# Patient Record
Sex: MALE | Race: WHITE | NOT HISPANIC OR LATINO | URBAN - METROPOLITAN AREA
[De-identification: names, ages, dates, MRNs, and addresses within clinical notes are randomized per-mention and may not be internally consistent; named-entity substitution may affect disease eponyms.]

---

## 2017-08-23 PROBLEM — C44.42 SQUAMOUS CELL CARCINOMA OF SKIN OF SCALP AND NECK: Status: ACTIVE | Noted: 2017-08-23

## 2017-08-23 PROBLEM — L91.8 OTHER HYPERTROPHIC DISORDERS OF THE SKIN: Status: ACTIVE | Noted: 2017-08-23

## 2017-09-28 ENCOUNTER — APPOINTMENT (RX ONLY)
Dept: URBAN - METROPOLITAN AREA CLINIC 36 | Facility: CLINIC | Age: 72
Setting detail: DERMATOLOGY
End: 2017-09-28

## 2017-09-28 DIAGNOSIS — Z48.02 ENCOUNTER FOR REMOVAL OF SUTURES: ICD-10-CM

## 2017-09-28 PROCEDURE — ? SUTURE REMOVAL (GLOBAL PERIOD)

## 2017-09-28 ASSESSMENT — LOCATION DETAILED DESCRIPTION DERM: LOCATION DETAILED: LEFT MEDIAL FRONTAL SCALP

## 2017-09-28 ASSESSMENT — LOCATION ZONE DERM: LOCATION ZONE: SCALP

## 2017-09-28 ASSESSMENT — LOCATION SIMPLE DESCRIPTION DERM: LOCATION SIMPLE: LEFT SCALP

## 2017-09-28 NOTE — PROCEDURE: SUTURE REMOVAL (GLOBAL PERIOD)
Detail Level: Detailed
Add 20165 Cpt? (Important Note: In 2017 The Use Of 91705 Is Being Tracked By Cms To Determine Future Global Period Reimbursement For Global Periods): no

## 2017-10-09 ENCOUNTER — RX ONLY (OUTPATIENT)
Age: 72
Setting detail: RX ONLY
End: 2017-10-09

## 2017-10-09 RX ORDER — TRIAMCINOLONE ACETONIDE 1 MG/G
TWICE CREAM TOPICAL DAILY
Qty: 1 | Refills: 0 | Status: ERX | COMMUNITY
Start: 2017-10-09

## 2017-10-18 ENCOUNTER — APPOINTMENT (RX ONLY)
Dept: URBAN - METROPOLITAN AREA CLINIC 20 | Facility: CLINIC | Age: 72
Setting detail: DERMATOLOGY
End: 2017-10-18

## 2017-10-18 DIAGNOSIS — R21 RASH AND OTHER NONSPECIFIC SKIN ERUPTION: ICD-10-CM

## 2017-10-18 PROCEDURE — ? COUNSELING

## 2017-10-18 PROCEDURE — 99213 OFFICE O/P EST LOW 20 MIN: CPT | Mod: 25

## 2017-10-18 PROCEDURE — ? ADDITIONAL NOTES

## 2017-10-18 PROCEDURE — 11100: CPT

## 2017-10-18 PROCEDURE — ? BIOPSY BY SHAVE METHOD

## 2017-10-18 PROCEDURE — ? SEPARATE AND IDENTIFIABLE DOCUMENTATION

## 2017-10-18 ASSESSMENT — LOCATION DETAILED DESCRIPTION DERM
LOCATION DETAILED: RIGHT PROXIMAL DORSAL FOREARM
LOCATION DETAILED: LEFT PROXIMAL DORSAL FOREARM
LOCATION DETAILED: LEFT PROXIMAL PRETIBIAL REGION
LOCATION DETAILED: RIGHT PROXIMAL PRETIBIAL REGION

## 2017-10-18 ASSESSMENT — LOCATION SIMPLE DESCRIPTION DERM
LOCATION SIMPLE: LEFT PRETIBIAL REGION
LOCATION SIMPLE: LEFT FOREARM
LOCATION SIMPLE: RIGHT PRETIBIAL REGION
LOCATION SIMPLE: RIGHT FOREARM

## 2017-10-18 ASSESSMENT — LOCATION ZONE DERM
LOCATION ZONE: LEG
LOCATION ZONE: ARM

## 2017-10-18 NOTE — HPI: RASH
How Severe Is Your Rash?: mild
Is This A New Presentation, Or A Follow-Up?: Follow Up Rash
Additional History: Pt is using Amlactin BID

## 2017-10-18 NOTE — PROCEDURE: COUNSELING
Patient Specific Counseling (Will Not Stick From Patient To Patient): D/c Amlactin and TAC.  Recommended OTC sensitive skin moisturizers to soothe the skin for now.
Detail Level: Zone

## 2017-10-18 NOTE — PROCEDURE: BIOPSY BY SHAVE METHOD
Anesthesia Type: 0.5% lidocaine with 1:400,000 epinephrine
Electrodesiccation And Curettage Text: The wound bed was treated with electrodesiccation and curettage after the biopsy was performed.
Biopsy Type: H and E
Billing Type: Third-Party Bill
Anesthesia Volume In Cc: 0.5
Wound Care: Aquaphor
Curettage Text: The wound bed was treated with curettage after the biopsy was performed.
Bill [id number] For Specimen Handling/Conveyance To Laboratory?: no
Electrodesiccation Text: The wound bed was treated with electrodesiccation after the biopsy was performed.
Biopsy Method: Personna blade
Silver Nitrate Text: The wound bed was treated with silver nitrate after the biopsy was performed.
Lab Facility: 
Render Post-Care Instructions In Note?: yes
X Size Of Lesion In Cm: 0
Consent: Written and verbal consent were obtained and risks were reviewed including but not limited to scarring, infection, bleeding, scabbing, incomplete removal, nerve damage and allergy to anesthesia.
Dressing: Band-Aid
Type Of Destruction Used: Curettage
Post-Care Instructions: Keep the biopsy site dry overnight, then keep the site clean by washing with soap and water twice daily then covering with Vaseline/Aquaphor and a Band-Aid until healed.
Cryotherapy Text: The wound bed was treated with cryotherapy after the biopsy was performed.
Lab: [id number]
Detail Level: Detailed
Hemostasis: Drysol and Electrocautery
Notification Instructions: Patient will be notified of biopsy results; however, patient is instructed to call the office if not contacted within 2 weeks.

## 2017-12-01 ENCOUNTER — APPOINTMENT (RX ONLY)
Dept: URBAN - METROPOLITAN AREA CLINIC 5 | Facility: CLINIC | Age: 72
Setting detail: DERMATOLOGY
End: 2017-12-01

## 2017-12-01 DIAGNOSIS — D22 MELANOCYTIC NEVI: ICD-10-CM

## 2017-12-01 DIAGNOSIS — L82.1 OTHER SEBORRHEIC KERATOSIS: ICD-10-CM

## 2017-12-01 DIAGNOSIS — L56.5 DISSEMINATED SUPERFICIAL ACTINIC POROKERATOSIS (DSAP): ICD-10-CM

## 2017-12-01 DIAGNOSIS — L57.0 ACTINIC KERATOSIS: ICD-10-CM

## 2017-12-01 DIAGNOSIS — L20.89 OTHER ATOPIC DERMATITIS: ICD-10-CM

## 2017-12-01 DIAGNOSIS — L82.0 INFLAMED SEBORRHEIC KERATOSIS: ICD-10-CM

## 2017-12-01 DIAGNOSIS — Z85.828 PERSONAL HISTORY OF OTHER MALIGNANT NEOPLASM OF SKIN: ICD-10-CM

## 2017-12-01 DIAGNOSIS — L81.4 OTHER MELANIN HYPERPIGMENTATION: ICD-10-CM

## 2017-12-01 DIAGNOSIS — L72.0 EPIDERMAL CYST: ICD-10-CM

## 2017-12-01 DIAGNOSIS — Z87.891 PERSONAL HISTORY OF NICOTINE DEPENDENCE: ICD-10-CM

## 2017-12-01 DIAGNOSIS — Z71.89 OTHER SPECIFIED COUNSELING: ICD-10-CM

## 2017-12-01 PROBLEM — L85.3 XEROSIS CUTIS: Status: ACTIVE | Noted: 2017-12-01

## 2017-12-01 PROBLEM — L20.84 INTRINSIC (ALLERGIC) ECZEMA: Status: ACTIVE | Noted: 2017-12-01

## 2017-12-01 PROBLEM — I10 ESSENTIAL (PRIMARY) HYPERTENSION: Status: ACTIVE | Noted: 2017-12-01

## 2017-12-01 PROBLEM — D22.5 MELANOCYTIC NEVI OF TRUNK: Status: ACTIVE | Noted: 2017-12-01

## 2017-12-01 PROBLEM — J45.909 UNSPECIFIED ASTHMA, UNCOMPLICATED: Status: ACTIVE | Noted: 2017-12-01

## 2017-12-01 PROBLEM — L70.0 ACNE VULGARIS: Status: ACTIVE | Noted: 2017-12-01

## 2017-12-01 PROBLEM — D48.5 NEOPLASM OF UNCERTAIN BEHAVIOR OF SKIN: Status: ACTIVE | Noted: 2017-12-01

## 2017-12-01 PROCEDURE — ? OBSERVATION

## 2017-12-01 PROCEDURE — 17111 DESTRUCTION B9 LESIONS 15/>: CPT | Mod: 59

## 2017-12-01 PROCEDURE — ? BIOPSY BY SHAVE METHOD

## 2017-12-01 PROCEDURE — 11100: CPT | Mod: 59

## 2017-12-01 PROCEDURE — ? COUNSELING

## 2017-12-01 PROCEDURE — 99203 OFFICE O/P NEW LOW 30 MIN: CPT | Mod: 25

## 2017-12-01 PROCEDURE — 17004 DESTROY PREMAL LESIONS 15/>: CPT

## 2017-12-01 PROCEDURE — ? OTHER

## 2017-12-01 PROCEDURE — ? LIQUID NITROGEN

## 2017-12-01 PROCEDURE — ? PRESCRIPTION

## 2017-12-01 RX ORDER — FLUOROURACIL 2 G/40G
CREAM TOPICAL BID
Qty: 2 | Refills: 6 | Status: ERX | COMMUNITY
Start: 2017-12-01

## 2017-12-01 RX ADMIN — FLUOROURACIL 1: 2 CREAM TOPICAL at 00:00

## 2017-12-01 ASSESSMENT — LOCATION DETAILED DESCRIPTION DERM
LOCATION DETAILED: LEFT POSTERIOR SHOULDER
LOCATION DETAILED: LEFT CLAVICULAR NECK
LOCATION DETAILED: EPIGASTRIC SKIN
LOCATION DETAILED: INFERIOR THORACIC SPINE
LOCATION DETAILED: RIGHT SUPERIOR UPPER BACK
LOCATION DETAILED: RIGHT CENTRAL FRONTAL SCALP
LOCATION DETAILED: LEFT LATERAL SUPERIOR CHEST
LOCATION DETAILED: LEFT ANTERIOR DISTAL THIGH
LOCATION DETAILED: RIGHT CLAVICULAR NECK
LOCATION DETAILED: RIGHT MEDIAL SUPERIOR CHEST
LOCATION DETAILED: LEFT ANTERIOR PROXIMAL THIGH
LOCATION DETAILED: RIGHT PROXIMAL DORSAL FOREARM
LOCATION DETAILED: RIGHT ULNAR DORSAL HAND
LOCATION DETAILED: RIGHT SUPERIOR FOREHEAD
LOCATION DETAILED: RIGHT VENTRAL PROXIMAL FOREARM
LOCATION DETAILED: LEFT SUPERIOR PARIETAL SCALP
LOCATION DETAILED: LEFT CLAVICULAR SKIN
LOCATION DETAILED: RIGHT INFERIOR POSTERIOR NECK
LOCATION DETAILED: LEFT INFERIOR POSTAURICULAR SKIN
LOCATION DETAILED: RIGHT MEDIAL DORSAL WRIST
LOCATION DETAILED: RIGHT PROXIMAL PRETIBIAL REGION
LOCATION DETAILED: LEFT MEDIAL SUPERIOR CHEST
LOCATION DETAILED: RIGHT DISTAL POSTERIOR UPPER ARM
LOCATION DETAILED: RIGHT ANTERIOR DISTAL THIGH
LOCATION DETAILED: RIGHT DORSAL WRIST
LOCATION DETAILED: LEFT ANTERIOR SHOULDER
LOCATION DETAILED: LEFT DISTAL DORSAL FOREARM
LOCATION DETAILED: RIGHT DISTAL DORSAL FOREARM
LOCATION DETAILED: RIGHT DISTAL ULNAR DORSAL FOREARM
LOCATION DETAILED: LEFT PROXIMAL DORSAL FOREARM
LOCATION DETAILED: LEFT PROXIMAL RADIAL DORSAL FOREARM
LOCATION DETAILED: LEFT SUPERIOR MEDIAL UPPER BACK
LOCATION DETAILED: RIGHT LATERAL SUPERIOR CHEST
LOCATION DETAILED: LEFT PROXIMAL PRETIBIAL REGION
LOCATION DETAILED: RIGHT SUPERIOR MEDIAL UPPER BACK
LOCATION DETAILED: PERIUMBILICAL SKIN
LOCATION DETAILED: LEFT INFERIOR LATERAL NECK
LOCATION DETAILED: LEFT DISTAL RADIAL DORSAL FOREARM
LOCATION DETAILED: RIGHT POSTERIOR SHOULDER
LOCATION DETAILED: RIGHT MEDIAL INFERIOR CHEST
LOCATION DETAILED: RIGHT PROXIMAL ULNAR DORSAL FOREARM

## 2017-12-01 ASSESSMENT — LOCATION SIMPLE DESCRIPTION DERM
LOCATION SIMPLE: RIGHT THIGH
LOCATION SIMPLE: POSTERIOR NECK
LOCATION SIMPLE: RIGHT WRIST
LOCATION SIMPLE: RIGHT HAND
LOCATION SIMPLE: RIGHT PRETIBIAL REGION
LOCATION SIMPLE: SCALP
LOCATION SIMPLE: RIGHT FOREARM
LOCATION SIMPLE: UPPER BACK
LOCATION SIMPLE: RIGHT ANTERIOR NECK
LOCATION SIMPLE: LEFT CLAVICULAR SKIN
LOCATION SIMPLE: RIGHT FOREHEAD
LOCATION SIMPLE: RIGHT SCALP
LOCATION SIMPLE: LEFT THIGH
LOCATION SIMPLE: LEFT ANTERIOR NECK
LOCATION SIMPLE: ABDOMEN
LOCATION SIMPLE: RIGHT UPPER ARM
LOCATION SIMPLE: LEFT FOREARM
LOCATION SIMPLE: LEFT UPPER BACK
LOCATION SIMPLE: CHEST
LOCATION SIMPLE: RIGHT SHOULDER
LOCATION SIMPLE: LEFT SHOULDER
LOCATION SIMPLE: LEFT PRETIBIAL REGION
LOCATION SIMPLE: RIGHT UPPER BACK

## 2017-12-01 ASSESSMENT — LOCATION ZONE DERM
LOCATION ZONE: TRUNK
LOCATION ZONE: NECK
LOCATION ZONE: ARM
LOCATION ZONE: FACE
LOCATION ZONE: LEG
LOCATION ZONE: HAND
LOCATION ZONE: SCALP

## 2017-12-01 NOTE — PROCEDURE: BIOPSY BY SHAVE METHOD
Detail Level: Detailed
Consent: Written consent was obtained and risks were reviewed including but not limited to scarring, infection, bleeding, scabbing, incomplete removal, nerve damage and allergy to anesthesia.
Curettage Text: The wound bed was treated with curettage after the biopsy was performed.
Bill For Surgical Tray: no
X Size Of Lesion In Cm: 0
Biopsy Type: H and E
Anesthesia Type: 1% lidocaine with epinephrine
Notification Instructions: Patient will be notified of biopsy results. However, patient instructed to call the office if not contacted within 2 weeks. Results will be faxed to PCP once they are available.
Lab: [id number]
Silver Nitrate Text: The wound bed was treated with silver nitrate after the biopsy was performed.
Hemostasis: Drysol
Body Location Override (Optional - Billing Will Still Be Based On Selected Body Map Location If Applicable): right upper back
Electrodesiccation Text: The wound bed was treated with electrodesiccation after the biopsy was performed.
Lab Facility: [id number]
Billing Type: Third-Party Bill
Post-Care Instructions: I reviewed with the patient in detail post-care instructions. Patient is to keep the biopsy site dry overnight, and then apply bacitracin twice daily until healed. Patient may apply hydrogen peroxide soaks to remove any crusting.
Biopsy Method: Dermablade
Cryotherapy Text: The wound bed was treated with cryotherapy after the biopsy was performed.
Dressing: bandage
Render Post-Care Instructions In Note?: yes
Wound Care: Petrolatum
Electrodesiccation And Curettage Text: The wound bed was treated with electrodesiccation and curettage after the biopsy was performed.
Type Of Destruction Used: Curettage
Anesthesia Volume In Cc: 0.5

## 2017-12-01 NOTE — PROCEDURE: LIQUID NITROGEN
Render Post-Care Instructions In Note?: yes
Consent: The patient's consent was obtained including but not limited to risks of crusting, scabbing, blistering, scarring, darker or lighter pigmentary change, recurrence, incomplete removal and infection.
Post-Care Instructions: I reviewed with the patient in detail post-care instructions. Patient is to wear sunprotection, and avoid picking at any of the treated lesions. Pt may apply Vaseline to crusted or scabbing areas.
Detail Level: Detailed
Duration Of Freeze Thaw-Cycle (Seconds): 5
Medical Necessity Clause: This procedure was medically necessary because the lesions that were treated were:
Include Z78.9 (Other Specified Conditions Influencing Health Status) As An Associated Diagnosis?: No
Medical Necessity Information: It is in your best interest to select a reason for this procedure from the list below. All of these items fulfill various CMS LCD requirements except the new and changing color options.
Number Of Freeze-Thaw Cycles: 1 freeze-thaw cycle

## 2017-12-01 NOTE — PROCEDURE: OTHER
Other (Free Text): Discussed and reviewed wound care.
Note Text (......Xxx Chief Complaint.): This diagnosis correlates with the
Detail Level: Zone

## 2018-04-05 ENCOUNTER — APPOINTMENT (RX ONLY)
Dept: URBAN - METROPOLITAN AREA CLINIC 5 | Facility: CLINIC | Age: 73
Setting detail: DERMATOLOGY
End: 2018-04-05

## 2018-04-05 DIAGNOSIS — L57.0 ACTINIC KERATOSIS: ICD-10-CM

## 2018-04-05 DIAGNOSIS — D22 MELANOCYTIC NEVI: ICD-10-CM

## 2018-04-05 DIAGNOSIS — L82.0 INFLAMED SEBORRHEIC KERATOSIS: ICD-10-CM

## 2018-04-05 DIAGNOSIS — L85.3 XEROSIS CUTIS: ICD-10-CM

## 2018-04-05 DIAGNOSIS — L85.8 OTHER SPECIFIED EPIDERMAL THICKENING: ICD-10-CM

## 2018-04-05 PROBLEM — D48.5 NEOPLASM OF UNCERTAIN BEHAVIOR OF SKIN: Status: ACTIVE | Noted: 2018-04-05

## 2018-04-05 PROCEDURE — ? COUNSELING

## 2018-04-05 PROCEDURE — 17000 DESTRUCT PREMALG LESION: CPT | Mod: 59

## 2018-04-05 PROCEDURE — ? BIOPSY BY SHAVE METHOD

## 2018-04-05 PROCEDURE — 17003 DESTRUCT PREMALG LES 2-14: CPT

## 2018-04-05 PROCEDURE — 11100: CPT | Mod: 59

## 2018-04-05 PROCEDURE — ? LIQUID NITROGEN

## 2018-04-05 PROCEDURE — ? PRESCRIPTION

## 2018-04-05 PROCEDURE — 99213 OFFICE O/P EST LOW 20 MIN: CPT | Mod: 25

## 2018-04-05 PROCEDURE — 17110 DESTRUCTION B9 LES UP TO 14: CPT

## 2018-04-05 RX ORDER — TRIAMCINOLONE ACETONIDE 1 MG/G
1 CREAM TOPICAL TWICE DAILY
Qty: 1 | Refills: 6 | Status: ERX | COMMUNITY
Start: 2018-04-05

## 2018-04-05 RX ORDER — TRIAMCINOLONE ACETONIDE 1 MG/G
OINTMENT TOPICAL BID
Qty: 1 | Refills: 4 | Status: ERX | COMMUNITY
Start: 2018-04-05

## 2018-04-05 RX ADMIN — TRIAMCINOLONE ACETONIDE 1: 1 CREAM TOPICAL at 00:00

## 2018-04-05 RX ADMIN — TRIAMCINOLONE ACETONIDE 1: 1 OINTMENT TOPICAL at 00:00

## 2018-04-05 ASSESSMENT — LOCATION SIMPLE DESCRIPTION DERM
LOCATION SIMPLE: CHEST
LOCATION SIMPLE: RIGHT FOREARM
LOCATION SIMPLE: LEFT FOREHEAD
LOCATION SIMPLE: POSTERIOR NECK
LOCATION SIMPLE: RIGHT ANTERIOR NECK
LOCATION SIMPLE: LEFT FOREARM
LOCATION SIMPLE: RIGHT UPPER BACK
LOCATION SIMPLE: LEFT ACHILLES SKIN

## 2018-04-05 ASSESSMENT — LOCATION DETAILED DESCRIPTION DERM
LOCATION DETAILED: LEFT MEDIAL INFERIOR CHEST
LOCATION DETAILED: RIGHT INFERIOR LATERAL NECK
LOCATION DETAILED: RIGHT INFERIOR ANTERIOR NECK
LOCATION DETAILED: RIGHT CLAVICULAR NECK
LOCATION DETAILED: LEFT SUPERIOR FOREHEAD
LOCATION DETAILED: RIGHT INFERIOR POSTERIOR NECK
LOCATION DETAILED: LEFT DISTAL DORSAL FOREARM
LOCATION DETAILED: RIGHT POSTERIOR NECK
LOCATION DETAILED: LEFT DISTAL ULNAR DORSAL FOREARM
LOCATION DETAILED: RIGHT MEDIAL UPPER BACK
LOCATION DETAILED: MID POSTERIOR NECK
LOCATION DETAILED: RIGHT DISTAL DORSAL FOREARM
LOCATION DETAILED: LEFT PROXIMAL DORSAL FOREARM
LOCATION DETAILED: LEFT ACHILLES SKIN

## 2018-04-05 ASSESSMENT — LOCATION ZONE DERM
LOCATION ZONE: NECK
LOCATION ZONE: FACE
LOCATION ZONE: ARM
LOCATION ZONE: TRUNK
LOCATION ZONE: LEG

## 2018-04-05 NOTE — PROCEDURE: LIQUID NITROGEN
Consent: The patient's consent was obtained including but not limited to risks of crusting, scabbing, blistering, scarring, darker or lighter pigmentary change, recurrence, incomplete removal and infection.
Post-Care Instructions: I reviewed with the patient in detail post-care instructions. Patient is to wear sunprotection, and avoid picking at any of the treated lesions. Pt may apply Vaseline to crusted or scabbing areas.
Detail Level: Detailed
Render Post-Care Instructions In Note?: yes
Duration Of Freeze Thaw-Cycle (Seconds): 5
Add 52 Modifier (Optional): no
Number Of Freeze-Thaw Cycles: 1 freeze-thaw cycle
Medical Necessity Clause: This procedure was medically necessary because the lesions that were treated were:
Medical Necessity Information: It is in your best interest to select a reason for this procedure from the list below. All of these items fulfill various CMS LCD requirements except the new and changing color options.

## 2018-04-05 NOTE — PROCEDURE: BIOPSY BY SHAVE METHOD
Silver Nitrate Text: The wound bed was treated with silver nitrate after the biopsy was performed.
Lab Facility: [id number]
Bill [id number] For Specimen Handling/Conveyance To Laboratory?: no
Anesthesia Volume In Cc: 0.5
Type Of Destruction Used: Curettage
Consent: Written consent was obtained and risks were reviewed including but not limited to scarring, infection, bleeding, scabbing, incomplete removal, nerve damage and allergy to anesthesia.
Size Of Lesion In Cm: 0
Render Post-Care Instructions In Note?: yes
Anesthesia Type: 1% lidocaine with epinephrine
Electrodesiccation Text: The wound bed was treated with electrodesiccation after the biopsy was performed.
Post-Care Instructions: I reviewed with the patient in detail post-care instructions. Patient is to keep the biopsy site dry overnight, and then apply bacitracin twice daily until healed. Patient may apply hydrogen peroxide soaks to remove any crusting.
Notification Instructions: Patient will be notified of biopsy results. However, patient instructed to call the office if not contacted within 2 weeks. Results will be faxed to PCP once they are available.
Biopsy Type: H and E
Dressing: bandage
Curettage Text: The wound bed was treated with curettage after the biopsy was performed.
Lab: [id number]
Biopsy Method: Dermablade
Hemostasis: Aluminum Chloride
Cryotherapy Text: The wound bed was treated with cryotherapy after the biopsy was performed.
Body Location Override (Optional - Billing Will Still Be Based On Selected Body Map Location If Applicable): left superior central forehead
Billing Type: Third-Party Bill
Detail Level: Detailed
Wound Care: Vaseline
Electrodesiccation And Curettage Text: The wound bed was treated with electrodesiccation and curettage after the biopsy was performed.

## 2018-11-16 ENCOUNTER — APPOINTMENT (RX ONLY)
Dept: URBAN - METROPOLITAN AREA CLINIC 5 | Facility: CLINIC | Age: 73
Setting detail: DERMATOLOGY
End: 2018-11-16

## 2018-11-16 DIAGNOSIS — L738 OTHER SPECIFIED DISEASES OF HAIR AND HAIR FOLLICLES: ICD-10-CM

## 2018-11-16 DIAGNOSIS — Z71.89 OTHER SPECIFIED COUNSELING: ICD-10-CM

## 2018-11-16 DIAGNOSIS — L85.3 XEROSIS CUTIS: ICD-10-CM

## 2018-11-16 DIAGNOSIS — L57.0 ACTINIC KERATOSIS: ICD-10-CM

## 2018-11-16 DIAGNOSIS — D18.0 HEMANGIOMA: ICD-10-CM

## 2018-11-16 DIAGNOSIS — D22 MELANOCYTIC NEVI: ICD-10-CM

## 2018-11-16 DIAGNOSIS — L81.4 OTHER MELANIN HYPERPIGMENTATION: ICD-10-CM

## 2018-11-16 DIAGNOSIS — L663 OTHER SPECIFIED DISEASES OF HAIR AND HAIR FOLLICLES: ICD-10-CM

## 2018-11-16 DIAGNOSIS — Z87.891 PERSONAL HISTORY OF NICOTINE DEPENDENCE: ICD-10-CM

## 2018-11-16 DIAGNOSIS — L73.9 FOLLICULAR DISORDER, UNSPECIFIED: ICD-10-CM

## 2018-11-16 DIAGNOSIS — L82.1 OTHER SEBORRHEIC KERATOSIS: ICD-10-CM

## 2018-11-16 DIAGNOSIS — Z85.828 PERSONAL HISTORY OF OTHER MALIGNANT NEOPLASM OF SKIN: ICD-10-CM

## 2018-11-16 PROBLEM — L02.821 FURUNCLE OF HEAD [ANY PART, EXCEPT FACE]: Status: ACTIVE | Noted: 2018-11-16

## 2018-11-16 PROBLEM — D48.5 NEOPLASM OF UNCERTAIN BEHAVIOR OF SKIN: Status: ACTIVE | Noted: 2018-11-16

## 2018-11-16 PROBLEM — D22.5 MELANOCYTIC NEVI OF TRUNK: Status: ACTIVE | Noted: 2018-11-16

## 2018-11-16 PROBLEM — D18.01 HEMANGIOMA OF SKIN AND SUBCUTANEOUS TISSUE: Status: ACTIVE | Noted: 2018-11-16

## 2018-11-16 PROCEDURE — 17003 DESTRUCT PREMALG LES 2-14: CPT

## 2018-11-16 PROCEDURE — 11101: CPT

## 2018-11-16 PROCEDURE — 99213 OFFICE O/P EST LOW 20 MIN: CPT | Mod: 25

## 2018-11-16 PROCEDURE — ? TREATMENT REGIMEN

## 2018-11-16 PROCEDURE — ? COUNSELING

## 2018-11-16 PROCEDURE — ? BIOPSY BY SHAVE METHOD

## 2018-11-16 PROCEDURE — 17000 DESTRUCT PREMALG LESION: CPT

## 2018-11-16 PROCEDURE — ? OBSERVATION

## 2018-11-16 PROCEDURE — ? LIQUID NITROGEN

## 2018-11-16 PROCEDURE — 11100: CPT | Mod: 59

## 2018-11-16 PROCEDURE — ? PRESCRIPTION

## 2018-11-16 RX ORDER — MINOCYCLINE HYDROCHLORIDE 100 MG/1
TABLET ORAL BID
Qty: 30 | Refills: 1 | Status: ERX | COMMUNITY
Start: 2018-11-16

## 2018-11-16 RX ADMIN — MINOCYCLINE HYDROCHLORIDE 1: 100 TABLET ORAL at 00:00

## 2018-11-16 ASSESSMENT — LOCATION DETAILED DESCRIPTION DERM
LOCATION DETAILED: LEFT MEDIAL UPPER BACK
LOCATION DETAILED: EPIGASTRIC SKIN
LOCATION DETAILED: LEFT MEDIAL FRONTAL SCALP
LOCATION DETAILED: LEFT INFERIOR POSTAURICULAR SKIN
LOCATION DETAILED: LEFT PROXIMAL PRETIBIAL REGION
LOCATION DETAILED: RIGHT DISTAL RADIAL DORSAL FOREARM
LOCATION DETAILED: LEFT POPLITEAL SKIN
LOCATION DETAILED: INFERIOR THORACIC SPINE
LOCATION DETAILED: LEFT PROXIMAL DORSAL FOREARM
LOCATION DETAILED: LEFT ANTERIOR PROXIMAL THIGH
LOCATION DETAILED: LEFT VENTRAL PROXIMAL FOREARM
LOCATION DETAILED: LEFT INFERIOR LATERAL NECK
LOCATION DETAILED: RIGHT SUPERIOR MEDIAL UPPER BACK
LOCATION DETAILED: PERIUMBILICAL SKIN
LOCATION DETAILED: LEFT SUPERIOR MEDIAL UPPER BACK
LOCATION DETAILED: RIGHT SUPERIOR FOREHEAD
LOCATION DETAILED: RIGHT PROXIMAL DORSAL FOREARM
LOCATION DETAILED: RIGHT PROXIMAL PRETIBIAL REGION
LOCATION DETAILED: LEFT SUPERIOR FRONTAL SCALP
LOCATION DETAILED: RIGHT INFERIOR UPPER BACK
LOCATION DETAILED: RIGHT MEDIAL INFERIOR CHEST
LOCATION DETAILED: LEFT LATERAL SUPERIOR CHEST
LOCATION DETAILED: RIGHT DISTAL PRETIBIAL REGION
LOCATION DETAILED: RIGHT INFERIOR POSTERIOR NECK
LOCATION DETAILED: LEFT DISTAL DORSAL FOREARM
LOCATION DETAILED: RIGHT PROXIMAL LATERAL CALF

## 2018-11-16 ASSESSMENT — LOCATION SIMPLE DESCRIPTION DERM
LOCATION SIMPLE: RIGHT FOREHEAD
LOCATION SIMPLE: POSTERIOR NECK
LOCATION SIMPLE: LEFT POPLITEAL SKIN
LOCATION SIMPLE: RIGHT CALF
LOCATION SIMPLE: CHEST
LOCATION SIMPLE: RIGHT PRETIBIAL REGION
LOCATION SIMPLE: UPPER BACK
LOCATION SIMPLE: LEFT SCALP
LOCATION SIMPLE: RIGHT FOREARM
LOCATION SIMPLE: LEFT FOREARM
LOCATION SIMPLE: LEFT ANTERIOR NECK
LOCATION SIMPLE: LEFT UPPER BACK
LOCATION SIMPLE: LEFT PRETIBIAL REGION
LOCATION SIMPLE: SCALP
LOCATION SIMPLE: RIGHT UPPER BACK
LOCATION SIMPLE: LEFT THIGH
LOCATION SIMPLE: ABDOMEN

## 2018-11-16 ASSESSMENT — LOCATION ZONE DERM
LOCATION ZONE: NECK
LOCATION ZONE: ARM
LOCATION ZONE: SCALP
LOCATION ZONE: TRUNK
LOCATION ZONE: FACE
LOCATION ZONE: LEG

## 2018-11-16 NOTE — PROCEDURE: OBSERVATION
Body Location Override (Optional - Billing Will Still Be Based On Selected Body Map Location If Applicable): left postauricular
X Size Of Lesion In Cm (Optional): 0
Detail Level: Detailed
Body Location Override (Optional - Billing Will Still Be Based On Selected Body Map Location If Applicable): right forehead

## 2018-11-16 NOTE — HPI: FULL BODY SKIN EXAMINATION
How Severe Are Your Spot(S)?: moderate
What Type Of Note Output Would You Prefer (Optional)?: Standard Output
What Is The Reason For Today's Visit?: Skin Lesions
What Is The Reason For Today's Visit? (Being Monitored For X): concerning skin lesions on an annual basis
What Is The Reason For Today's Visit?: Full Body Skin Examination

## 2018-11-16 NOTE — PROCEDURE: BIOPSY BY SHAVE METHOD
Electrodesiccation Text: The wound bed was treated with electrodesiccation after the biopsy was performed.
Lab: [id number]
Body Location Override (Optional - Billing Will Still Be Based On Selected Body Map Location If Applicable): right lateral elbow
Dressing: bandage
Additional Anesthesia Volume In Cc (Will Not Render If 0): 0
Anesthesia Volume In Cc: 0.5
Render Post-Care Instructions In Note?: yes
Type Of Destruction Used: Curettage
Wound Care: Vaseline
Silver Nitrate Text: The wound bed was treated with silver nitrate after the biopsy was performed.
Biopsy Method: Dermablade
Detail Level: Detailed
Bill [id number] For Specimen Handling/Conveyance To Laboratory?: no
Hemostasis: Aluminum Chloride
Cryotherapy Text: The wound bed was treated with cryotherapy after the biopsy was performed.
Post-Care Instructions: I reviewed with the patient in detail post-care instructions. Patient is to keep the biopsy site dry overnight, and then apply bacitracin twice daily until healed. Patient may apply hydrogen peroxide soaks to remove any crusting.
Lab Facility: [id number]
Depth Of Biopsy: dermis
Consent: Written consent was obtained and risks were reviewed including but not limited to scarring, infection, bleeding, scabbing, incomplete removal, nerve damage and allergy to anesthesia.
Curettage Text: The wound bed was treated with curettage after the biopsy was performed.
Notification Instructions: Patient will be notified of biopsy results. However, patient instructed to call the office if not contacted within 2 weeks. Results will be faxed to PCP once they are available.
Biopsy Type: H and E
Anesthesia Type: 1% lidocaine with epinephrine
Billing Type: Third-Party Bill
Electrodesiccation And Curettage Text: The wound bed was treated with electrodesiccation and curettage after the biopsy was performed.
Body Location Override (Optional - Billing Will Still Be Based On Selected Body Map Location If Applicable): left superior Medial back
Body Location Override (Optional - Billing Will Still Be Based On Selected Body Map Location If Applicable): left inferior medial back
Body Location Override (Optional - Billing Will Still Be Based On Selected Body Map Location If Applicable): right mid back

## 2018-11-16 NOTE — PROCEDURE: TREATMENT REGIMEN
Detail Level: Zone
Plan: Patient advised to take minocycline when flared only. \\n-minocycline 100mg daily

## 2018-11-16 NOTE — PROCEDURE: LIQUID NITROGEN
Post-Care Instructions: I reviewed with the patient in detail post-care instructions. Patient is to wear sunprotection, and avoid picking at any of the treated lesions. Pt may apply Vaseline to crusted or scabbing areas.
Consent: The patient's consent was obtained including but not limited to risks of crusting, scabbing, blistering, scarring, darker or lighter pigmentary change, recurrence, incomplete removal and infection.
Detail Level: Detailed
Duration Of Freeze Thaw-Cycle (Seconds): 5
Render Post-Care Instructions In Note?: yes

## 2018-11-30 ENCOUNTER — APPOINTMENT (RX ONLY)
Dept: URBAN - METROPOLITAN AREA CLINIC 5 | Facility: CLINIC | Age: 73
Setting detail: DERMATOLOGY
End: 2018-11-30

## 2018-11-30 DIAGNOSIS — D22 MELANOCYTIC NEVI: ICD-10-CM

## 2018-11-30 PROBLEM — D22.5 MELANOCYTIC NEVI OF TRUNK: Status: ACTIVE | Noted: 2018-11-30

## 2018-11-30 PROCEDURE — ? COUNSELING

## 2018-11-30 PROCEDURE — 11300 SHAVE SKIN LESION 0.5 CM/<: CPT

## 2018-11-30 PROCEDURE — ? SHAVE REMOVAL

## 2018-11-30 ASSESSMENT — LOCATION SIMPLE DESCRIPTION DERM: LOCATION SIMPLE: LEFT UPPER BACK

## 2018-11-30 ASSESSMENT — LOCATION DETAILED DESCRIPTION DERM: LOCATION DETAILED: LEFT SUPERIOR MEDIAL UPPER BACK

## 2018-11-30 ASSESSMENT — LOCATION ZONE DERM: LOCATION ZONE: TRUNK

## 2018-11-30 NOTE — PROCEDURE: SHAVE REMOVAL
Medical Necessity Information: It is in your best interest to select a reason for this procedure from the list below. All of these items fulfill various CMS LCD requirements except the new and changing color options.
Size Of Lesion In Cm (Required): 0.4
Path Notes (To The Dermatopathologist): Please check margins.
Billing Type: Third-Party Bill
Biopsy Method: Dermablade
Lab: [id number]
Was A Bandage Applied: Yes
Consent was obtained from the patient. The risks and benefits to therapy were discussed in detail. Specifically, the risks of infection, scarring, bleeding, prolonged wound healing, incomplete removal, allergy to anesthesia, nerve injury and recurrence were addressed. Prior to the procedure, the treatment site was clearly identified and confirmed by the patient. All components of Universal Protocol/PAUSE Rule completed.
Lab Facility: [id number]
Post-Care Instructions: I reviewed with the patient in detail post-care instructions. Patient is to keep the biopsy site dry overnight, and then apply bacitracin twice daily until healed. Patient may apply hydrogen peroxide soaks to remove any crusting.
Body Location Override (Optional - Billing Will Still Be Based On Selected Body Map Location If Applicable): left superior Medial back
Bill For Surgical Tray: no
Size Of Margin In Cm (Margins Are Not Added To Billing Dimensions): 0.2
Anesthesia Volume In Cc: 1
Anesthesia Type: 1% lidocaine with epinephrine
Notification Instructions: Patient will be notified of biopsy results. However, patient instructed to call the office if not contacted within 2 weeks.
Wound Care: Vaseline
Detail Level: Detailed
X Size Of Lesion In Cm (Optional): 0
Medical Necessity Clause: This procedure was medically necessary because the lesion that was treated was:
Hemostasis: Drysol

## 2019-04-17 ENCOUNTER — APPOINTMENT (RX ONLY)
Dept: URBAN - METROPOLITAN AREA CLINIC 5 | Facility: CLINIC | Age: 74
Setting detail: DERMATOLOGY
End: 2019-04-17

## 2019-04-17 DIAGNOSIS — L81.4 OTHER MELANIN HYPERPIGMENTATION: ICD-10-CM

## 2019-04-17 DIAGNOSIS — L57.0 ACTINIC KERATOSIS: ICD-10-CM

## 2019-04-17 DIAGNOSIS — Z85.828 PERSONAL HISTORY OF OTHER MALIGNANT NEOPLASM OF SKIN: ICD-10-CM

## 2019-04-17 DIAGNOSIS — Z71.89 OTHER SPECIFIED COUNSELING: ICD-10-CM

## 2019-04-17 DIAGNOSIS — L29.8 OTHER PRURITUS: ICD-10-CM

## 2019-04-17 DIAGNOSIS — L663 OTHER SPECIFIED DISEASES OF HAIR AND HAIR FOLLICLES: ICD-10-CM | Status: WORSENING

## 2019-04-17 DIAGNOSIS — L738 OTHER SPECIFIED DISEASES OF HAIR AND HAIR FOLLICLES: ICD-10-CM | Status: WORSENING

## 2019-04-17 DIAGNOSIS — L73.9 FOLLICULAR DISORDER, UNSPECIFIED: ICD-10-CM | Status: WORSENING

## 2019-04-17 DIAGNOSIS — Z87.2 PERSONAL HISTORY OF DISEASES OF THE SKIN AND SUBCUTANEOUS TISSUE: ICD-10-CM

## 2019-04-17 DIAGNOSIS — D22 MELANOCYTIC NEVI: ICD-10-CM

## 2019-04-17 DIAGNOSIS — L82.1 OTHER SEBORRHEIC KERATOSIS: ICD-10-CM

## 2019-04-17 DIAGNOSIS — L29.89 OTHER PRURITUS: ICD-10-CM

## 2019-04-17 PROBLEM — D48.5 NEOPLASM OF UNCERTAIN BEHAVIOR OF SKIN: Status: ACTIVE | Noted: 2019-04-17

## 2019-04-17 PROBLEM — L02.821 FURUNCLE OF HEAD [ANY PART, EXCEPT FACE]: Status: ACTIVE | Noted: 2019-04-17

## 2019-04-17 PROBLEM — D22.5 MELANOCYTIC NEVI OF TRUNK: Status: ACTIVE | Noted: 2019-04-17

## 2019-04-17 PROCEDURE — ? COUNSELING

## 2019-04-17 PROCEDURE — 17003 DESTRUCT PREMALG LES 2-14: CPT

## 2019-04-17 PROCEDURE — 99213 OFFICE O/P EST LOW 20 MIN: CPT | Mod: 25

## 2019-04-17 PROCEDURE — ? PRESCRIPTION

## 2019-04-17 PROCEDURE — 11102 TANGNTL BX SKIN SINGLE LES: CPT

## 2019-04-17 PROCEDURE — ? TREATMENT REGIMEN

## 2019-04-17 PROCEDURE — 17000 DESTRUCT PREMALG LESION: CPT | Mod: 59

## 2019-04-17 PROCEDURE — ? BIOPSY BY SHAVE METHOD

## 2019-04-17 PROCEDURE — ? SUNSCREEN RECOMMENDATIONS

## 2019-04-17 PROCEDURE — ? LIQUID NITROGEN

## 2019-04-17 PROCEDURE — ? OBSERVATION

## 2019-04-17 PROCEDURE — 11103 TANGNTL BX SKIN EA SEP/ADDL: CPT

## 2019-04-17 RX ORDER — MINOCYCLINE HYDROCHLORIDE 100 MG/1
TABLET ORAL AS DIRECTED
Qty: 30 | Refills: 3 | Status: ERX | COMMUNITY
Start: 2019-04-17

## 2019-04-17 RX ADMIN — MINOCYCLINE HYDROCHLORIDE 1: 100 TABLET ORAL at 00:00

## 2019-04-17 ASSESSMENT — LOCATION DETAILED DESCRIPTION DERM
LOCATION DETAILED: RIGHT DISTAL POSTERIOR UPPER ARM
LOCATION DETAILED: INFERIOR THORACIC SPINE
LOCATION DETAILED: LEFT PROXIMAL LATERAL POSTERIOR UPPER ARM
LOCATION DETAILED: RIGHT ANTERIOR PROXIMAL THIGH
LOCATION DETAILED: RIGHT DISTAL DORSAL FOREARM
LOCATION DETAILED: LEFT MEDIAL INFERIOR CHEST
LOCATION DETAILED: LEFT DISTAL POSTERIOR UPPER ARM
LOCATION DETAILED: LEFT LATERAL SUPERIOR CHEST
LOCATION DETAILED: RIGHT PROXIMAL LATERAL CALF
LOCATION DETAILED: EPIGASTRIC SKIN
LOCATION DETAILED: LEFT DISTAL CALF
LOCATION DETAILED: LEFT MEDIAL FRONTAL SCALP
LOCATION DETAILED: LEFT PROXIMAL DORSAL FOREARM
LOCATION DETAILED: LEFT INFERIOR FLANK
LOCATION DETAILED: LEFT LATERAL UPPER BACK
LOCATION DETAILED: RIGHT MEDIAL FRONTAL SCALP
LOCATION DETAILED: LEFT SUPERIOR PARIETAL SCALP
LOCATION DETAILED: LEFT PROXIMAL PRETIBIAL REGION
LOCATION DETAILED: LEFT INFERIOR POSTAURICULAR SKIN
LOCATION DETAILED: RIGHT DISTAL POSTERIOR THIGH
LOCATION DETAILED: LEFT POPLITEAL SKIN
LOCATION DETAILED: LEFT VENTRAL PROXIMAL FOREARM
LOCATION DETAILED: PERIUMBILICAL SKIN
LOCATION DETAILED: RIGHT SUPERIOR UPPER BACK
LOCATION DETAILED: LEFT ANTERIOR PROXIMAL THIGH
LOCATION DETAILED: LEFT DISTAL DORSAL FOREARM
LOCATION DETAILED: RIGHT PROXIMAL POSTERIOR UPPER ARM
LOCATION DETAILED: RIGHT SUPERIOR MEDIAL UPPER BACK
LOCATION DETAILED: RIGHT SUPERIOR FOREHEAD
LOCATION DETAILED: LEFT SUPERIOR MEDIAL UPPER BACK
LOCATION DETAILED: RIGHT CENTRAL FRONTAL SCALP
LOCATION DETAILED: LEFT MEDIAL UPPER BACK
LOCATION DETAILED: RIGHT PROXIMAL PRETIBIAL REGION
LOCATION DETAILED: LEFT CENTRAL MALAR CHEEK
LOCATION DETAILED: RIGHT MID-UPPER BACK

## 2019-04-17 ASSESSMENT — LOCATION SIMPLE DESCRIPTION DERM
LOCATION SIMPLE: UPPER BACK
LOCATION SIMPLE: RIGHT SCALP
LOCATION SIMPLE: ABDOMEN
LOCATION SIMPLE: RIGHT FOREARM
LOCATION SIMPLE: LEFT CALF
LOCATION SIMPLE: LEFT THIGH
LOCATION SIMPLE: LEFT UPPER ARM
LOCATION SIMPLE: LEFT POPLITEAL SKIN
LOCATION SIMPLE: RIGHT UPPER BACK
LOCATION SIMPLE: LEFT CHEEK
LOCATION SIMPLE: RIGHT PRETIBIAL REGION
LOCATION SIMPLE: LEFT PRETIBIAL REGION
LOCATION SIMPLE: SCALP
LOCATION SIMPLE: LEFT UPPER BACK
LOCATION SIMPLE: LEFT FOREARM
LOCATION SIMPLE: RIGHT POSTERIOR THIGH
LOCATION SIMPLE: RIGHT UPPER ARM
LOCATION SIMPLE: RIGHT FOREHEAD
LOCATION SIMPLE: LEFT SCALP
LOCATION SIMPLE: RIGHT THIGH
LOCATION SIMPLE: CHEST
LOCATION SIMPLE: RIGHT CALF

## 2019-04-17 ASSESSMENT — LOCATION ZONE DERM
LOCATION ZONE: FACE
LOCATION ZONE: ARM
LOCATION ZONE: LEG
LOCATION ZONE: SCALP
LOCATION ZONE: TRUNK

## 2019-04-17 NOTE — PROCEDURE: OBSERVATION
Size Of Lesion In Cm (Optional): 0
Body Location Override (Optional - Billing Will Still Be Based On Selected Body Map Location If Applicable): right mid back
Detail Level: Detailed
Body Location Override (Optional - Billing Will Still Be Based On Selected Body Map Location If Applicable): left superior medial back
Body Location Override (Optional - Billing Will Still Be Based On Selected Body Map Location If Applicable): left postauricular
Body Location Override (Optional - Billing Will Still Be Based On Selected Body Map Location If Applicable): right forehead

## 2019-04-17 NOTE — PROCEDURE: BIOPSY BY SHAVE METHOD
Anesthesia Volume In Cc: 0.5
Type Of Destruction Used: Curettage
Electrodesiccation And Curettage Text: The wound bed was treated with electrodesiccation and curettage after the biopsy was performed.
X Size Of Lesion In Cm: 0
Depth Of Biopsy: dermis
Biopsy Type: H and E
Billing Type: Third-Party Bill
Hemostasis: Aluminum Chloride
Silver Nitrate Text: The wound bed was treated with silver nitrate after the biopsy was performed.
Notification Instructions: Patient will be notified of biopsy results. However, patient instructed to call the office if not contacted within 2 weeks. Results will be faxed to PCP once they are available.
Was A Bandage Applied: Yes
Body Location Override (Optional - Billing Will Still Be Based On Selected Body Map Location If Applicable): left superior flank
Post-Care Instructions: I reviewed with the patient in detail post-care instructions. Patient is to keep the biopsy site dry overnight, and then apply bacitracin twice daily until healed. Patient may apply hydrogen peroxide soaks to remove any crusting.
Dressing: bandage
Consent: Written consent was obtained and risks were reviewed including but not limited to scarring, infection, bleeding, scabbing, incomplete removal, nerve damage and allergy to anesthesia.
Destruction After The Procedure: No
Biopsy Method: Dermablade
Detail Level: Detailed
Lab: [id number]
Curettage Text: The wound bed was treated with curettage after the biopsy was performed.
Electrodesiccation Text: The wound bed was treated with electrodesiccation after the biopsy was performed.
Cryotherapy Text: The wound bed was treated with cryotherapy after the biopsy was performed.
Anesthesia Type: 1% lidocaine with epinephrine
Wound Care: Vaseline
Lab Facility: [id number]

## 2019-05-02 ENCOUNTER — RX ONLY (OUTPATIENT)
Age: 74
Setting detail: RX ONLY
End: 2019-05-02

## 2019-05-02 RX ORDER — DOXYCYCLINE HYCLATE 100 MG/1
CAPSULE, GELATIN COATED ORAL
Qty: 30 | Refills: 3 | Status: ERX | COMMUNITY
Start: 2019-05-02

## 2019-11-13 ENCOUNTER — APPOINTMENT (RX ONLY)
Dept: URBAN - METROPOLITAN AREA CLINIC 5 | Facility: CLINIC | Age: 74
Setting detail: DERMATOLOGY
End: 2019-11-13

## 2019-11-13 DIAGNOSIS — L82.1 OTHER SEBORRHEIC KERATOSIS: ICD-10-CM

## 2019-11-13 DIAGNOSIS — Z71.89 OTHER SPECIFIED COUNSELING: ICD-10-CM

## 2019-11-13 DIAGNOSIS — D22 MELANOCYTIC NEVI: ICD-10-CM

## 2019-11-13 DIAGNOSIS — L85.3 XEROSIS CUTIS: ICD-10-CM

## 2019-11-13 DIAGNOSIS — Z87.2 PERSONAL HISTORY OF DISEASES OF THE SKIN AND SUBCUTANEOUS TISSUE: ICD-10-CM

## 2019-11-13 DIAGNOSIS — Z85.828 PERSONAL HISTORY OF OTHER MALIGNANT NEOPLASM OF SKIN: ICD-10-CM

## 2019-11-13 DIAGNOSIS — L81.4 OTHER MELANIN HYPERPIGMENTATION: ICD-10-CM

## 2019-11-13 PROBLEM — D22.5 MELANOCYTIC NEVI OF TRUNK: Status: ACTIVE | Noted: 2019-11-13

## 2019-11-13 PROCEDURE — ? OBSERVATION

## 2019-11-13 PROCEDURE — 99213 OFFICE O/P EST LOW 20 MIN: CPT | Mod: 25

## 2019-11-13 PROCEDURE — ? SHAVE REMOVAL

## 2019-11-13 PROCEDURE — ? COUNSELING

## 2019-11-13 PROCEDURE — 11300 SHAVE SKIN LESION 0.5 CM/<: CPT

## 2019-11-13 PROCEDURE — 11300 SHAVE SKIN LESION 0.5 CM/<: CPT | Mod: 76

## 2019-11-13 PROCEDURE — ? SUNSCREEN RECOMMENDATIONS

## 2019-11-13 ASSESSMENT — LOCATION SIMPLE DESCRIPTION DERM
LOCATION SIMPLE: LEFT ELBOW
LOCATION SIMPLE: RIGHT UPPER ARM
LOCATION SIMPLE: LEFT UPPER ARM
LOCATION SIMPLE: ABDOMEN
LOCATION SIMPLE: RIGHT UPPER BACK
LOCATION SIMPLE: LEFT UPPER BACK
LOCATION SIMPLE: RIGHT PRETIBIAL REGION
LOCATION SIMPLE: RIGHT ELBOW
LOCATION SIMPLE: RIGHT FOREHEAD
LOCATION SIMPLE: LEFT PRETIBIAL REGION
LOCATION SIMPLE: RIGHT LOWER BACK
LOCATION SIMPLE: RIGHT KNEE
LOCATION SIMPLE: LEFT KNEE
LOCATION SIMPLE: SCALP

## 2019-11-13 ASSESSMENT — LOCATION DETAILED DESCRIPTION DERM
LOCATION DETAILED: LEFT ELBOW
LOCATION DETAILED: RIGHT DISTAL POSTERIOR UPPER ARM
LOCATION DETAILED: LEFT MID-UPPER BACK
LOCATION DETAILED: PERIUMBILICAL SKIN
LOCATION DETAILED: LEFT DISTAL POSTERIOR UPPER ARM
LOCATION DETAILED: RIGHT SUPERIOR FOREHEAD
LOCATION DETAILED: RIGHT MID-UPPER BACK
LOCATION DETAILED: RIGHT SUPERIOR MEDIAL UPPER BACK
LOCATION DETAILED: RIGHT SUPERIOR MEDIAL MIDBACK
LOCATION DETAILED: LEFT PROXIMAL PRETIBIAL REGION
LOCATION DETAILED: LEFT KNEE
LOCATION DETAILED: RIGHT INFERIOR UPPER BACK
LOCATION DETAILED: RIGHT KNEE
LOCATION DETAILED: RIGHT PROXIMAL PRETIBIAL REGION
LOCATION DETAILED: LEFT INFERIOR POSTAURICULAR SKIN
LOCATION DETAILED: LEFT MEDIAL UPPER BACK
LOCATION DETAILED: LEFT INFERIOR FLANK
LOCATION DETAILED: RIGHT ELBOW

## 2019-11-13 ASSESSMENT — LOCATION ZONE DERM
LOCATION ZONE: LEG
LOCATION ZONE: SCALP
LOCATION ZONE: ARM
LOCATION ZONE: TRUNK
LOCATION ZONE: FACE

## 2019-11-13 NOTE — PROCEDURE: OBSERVATION
X Size Of Lesion In Cm (Optional): 0
Body Location Override (Optional - Billing Will Still Be Based On Selected Body Map Location If Applicable): right forehead
Detail Level: Detailed
Body Location Override (Optional - Billing Will Still Be Based On Selected Body Map Location If Applicable): left postauricular
Body Location Override (Optional - Billing Will Still Be Based On Selected Body Map Location If Applicable): right mid back
Body Location Override (Optional - Billing Will Still Be Based On Selected Body Map Location If Applicable): left superior medial back

## 2019-11-13 NOTE — PROCEDURE: SHAVE REMOVAL
Body Location Override (Optional - Billing Will Still Be Based On Selected Body Map Location If Applicable): right mid back
Bill For Surgical Tray: no
Medical Necessity Clause: This procedure was medically necessary because the lesion that was treated was:
Biopsy Method: Dermablade
Post-Care Instructions: I reviewed with the patient in detail post-care instructions. Patient is to keep the biopsy site dry overnight, and then apply bacitracin twice daily until healed. Patient may apply hydrogen peroxide soaks to remove any crusting.
Path Notes (To The Dermatopathologist): Please check margins.
Medical Necessity Information: It is in your best interest to select a reason for this procedure from the list below. All of these items fulfill various CMS LCD requirements except the new and changing color options.
Hemostasis: Drysol
Size Of Lesion In Cm (Required): 0.3
Consent was obtained from the patient. The risks and benefits to therapy were discussed in detail. Specifically, the risks of infection, scarring, bleeding, prolonged wound healing, incomplete removal, allergy to anesthesia, nerve injury and recurrence were addressed. Prior to the procedure, the treatment site was clearly identified and confirmed by the patient. All components of Universal Protocol/PAUSE Rule completed.
Detail Level: Detailed
Notification Instructions: Patient will be notified of biopsy results. However, patient instructed to call the office if not contacted within 2 weeks.
Render Post-Care Instructions In Note?: yes
Anesthesia Volume In Cc: 0.4
Lab: [id number]
X Size Of Lesion In Cm (Optional): 0
Billing Type: Third-Party Bill
Anesthesia Type: 1% lidocaine with epinephrine
Lab Facility: [id number]
Wound Care: Vaseline
Size Of Lesion In Cm (Required): 0.2
Body Location Override (Optional - Billing Will Still Be Based On Selected Body Map Location If Applicable): left superior medial back
Body Location Override (Optional - Billing Will Still Be Based On Selected Body Map Location If Applicable): left inferior flank

## 2019-11-27 ENCOUNTER — APPOINTMENT (RX ONLY)
Dept: URBAN - METROPOLITAN AREA CLINIC 5 | Facility: CLINIC | Age: 74
Setting detail: DERMATOLOGY
End: 2019-11-27

## 2019-11-27 DIAGNOSIS — D22 MELANOCYTIC NEVI: ICD-10-CM

## 2019-11-27 PROBLEM — D22.5 MELANOCYTIC NEVI OF TRUNK: Status: ACTIVE | Noted: 2019-11-27

## 2019-11-27 PROCEDURE — 11402 EXC TR-EXT B9+MARG 1.1-2 CM: CPT

## 2019-11-27 PROCEDURE — 13101 CMPLX RPR TRUNK 2.6-7.5 CM: CPT

## 2019-11-27 PROCEDURE — ? EXCISION

## 2019-11-27 ASSESSMENT — LOCATION DETAILED DESCRIPTION DERM: LOCATION DETAILED: LEFT SUPERIOR UPPER BACK

## 2019-11-27 ASSESSMENT — LOCATION SIMPLE DESCRIPTION DERM: LOCATION SIMPLE: LEFT UPPER BACK

## 2019-11-27 ASSESSMENT — LOCATION ZONE DERM: LOCATION ZONE: TRUNK

## 2019-11-27 NOTE — PROCEDURE: EXCISION
Complex Repair And O-T Advancement Flap Text: The defect edges were debeveled with a #15 scalpel blade.  The primary defect was closed partially with a complex linear closure.  Given the location of the remaining defect, shape of the defect and the proximity to free margins an O-T advancement flap was deemed most appropriate for complete closure of the defect.  Using a sterile surgical marker, an appropriate advancement flap was drawn incorporating the defect and placing the expected incisions within the relaxed skin tension lines where possible.    The area thus outlined was incised deep to adipose tissue with a #15 scalpel blade.  The skin margins were undermined to an appropriate distance in all directions utilizing iris scissors.
Size Of Margin In Cm: 0.3
Complex Repair And Skin Substitute Graft Text: The defect edges were debeveled with a #15 scalpel blade.  The primary defect was closed partially with a complex linear closure.  Given the location of the remaining defect, shape of the defect and the proximity to free margins a skin substitute graft was deemed most appropriate to repair the remaining defect.  The graft was trimmed to fit the size of the remaining defect.  The graft was then placed in the primary defect, oriented appropriately, and sutured into place.
Show Anatomic Location From Referring Provider Variable: Yes
Add Nub Associated Diagnoses If Applicable When Selecting Medical Necessity: No
No Repair - Repaired With Adjacent Surgical Defect Text (Leave Blank If You Do Not Want): After the excision the defect was repaired concurrently with another surgical defect which was in close approximation.
Mucosal Advancement Flap Text: Given the location of the defect, shape of the defect and the proximity to free margins a mucosal advancement flap was deemed most appropriate. Incisions were made with a 15 blade scalpel in the appropriate fashion along the cutaneous vermillion border and the mucosal lip. The remaining actinically damaged mucosal tissue was excised.  The mucosal advancement flap was then elevated to the gingival sulcus with care taken to preserve the neurovascular structures and advanced into the primary defect. Care was taken to ensure that precise realignment of the vermillion border was achieved.
Island Pedicle Flap Text: The defect edges were debeveled with a #15 scalpel blade.  Given the location of the defect, shape of the defect and the proximity to free margins an island pedicle advancement flap was deemed most appropriate.  Using a sterile surgical marker, an appropriate advancement flap was drawn incorporating the defect, outlining the appropriate donor tissue and placing the expected incisions within the relaxed skin tension lines where possible.    The area thus outlined was incised deep to adipose tissue with a #15 scalpel blade.  The skin margins were undermined to an appropriate distance in all directions around the primary defect and laterally outward around the island pedicle utilizing iris scissors.  There was minimal undermining beneath the pedicle flap.
Skin Substitute Units (Will Override Primary Defect Units If Greater Than 0): 0
Hemostasis: Electrocautery
Posterior Auricular Interpolation Flap Text: A decision was made to reconstruct the defect utilizing an interpolation axial flap and a staged reconstruction.  A telfa template was made of the defect.  This telfa template was then used to outline the posterior auricular interpolation flap.  The donor area for the pedicle flap was then injected with anesthesia.  The flap was excised through the skin and subcutaneous tissue down to the layer of the underlying musculature.  The pedicle flap was carefully excised within this deep plane to maintain its blood supply.  The edges of the donor site were undermined.   The donor site was closed in a primary fashion.  The pedicle was then rotated into position and sutured.  Once the tube was sutured into place, adequate blood supply was confirmed with blanching and refill.  The pedicle was then wrapped with xeroform gauze and dressed appropriately with a telfa and gauze bandage to ensure continued blood supply and protect the attached pedicle.
Complex Repair And O-L Flap Text: The defect edges were debeveled with a #15 scalpel blade.  The primary defect was closed partially with a complex linear closure.  Given the location of the remaining defect, shape of the defect and the proximity to free margins an O-L flap was deemed most appropriate for complete closure of the defect.  Using a sterile surgical marker, an appropriate flap was drawn incorporating the defect and placing the expected incisions within the relaxed skin tension lines where possible.    The area thus outlined was incised deep to adipose tissue with a #15 scalpel blade.  The skin margins were undermined to an appropriate distance in all directions utilizing iris scissors.
Anesthesia Volume In Cc: 3
Complex Repair Preamble Text (Leave Blank If You Do Not Want): Extensive wide undermining was performed.
Advancement Flap (Single) Text: The defect edges were debeveled with a #15 scalpel blade.  Given the location of the defect and the proximity to free margins a single advancement flap was deemed most appropriate.  Using a sterile surgical marker, an appropriate advancement flap was drawn incorporating the defect and placing the expected incisions within the relaxed skin tension lines where possible.    The area thus outlined was incised deep to adipose tissue with a #15 scalpel blade.  The skin margins were undermined to an appropriate distance in all directions utilizing iris scissors.
Hatchet Flap Text: The defect edges were debeveled with a #15 scalpel blade.  Given the location of the defect, shape of the defect and the proximity to free margins a hatchet flap was deemed most appropriate.  Using a sterile surgical marker, an appropriate hatchet flap was drawn incorporating the defect and placing the expected incisions within the relaxed skin tension lines where possible.    The area thus outlined was incised deep to adipose tissue with a #15 scalpel blade.  The skin margins were undermined to an appropriate distance in all directions utilizing iris scissors.
Epidermal Closure Graft Donor Site (Optional): simple interrupted
Island Pedicle Flap With Canthal Suspension Text: The defect edges were debeveled with a #15 scalpel blade.  Given the location of the defect, shape of the defect and the proximity to free margins an island pedicle advancement flap was deemed most appropriate.  Using a sterile surgical marker, an appropriate advancement flap was drawn incorporating the defect, outlining the appropriate donor tissue and placing the expected incisions within the relaxed skin tension lines where possible. The area thus outlined was incised deep to adipose tissue with a #15 scalpel blade.  The skin margins were undermined to an appropriate distance in all directions around the primary defect and laterally outward around the island pedicle utilizing iris scissors.  There was minimal undermining beneath the pedicle flap. A suspension suture was placed in the canthal tendon to prevent tension and prevent ectropion.
Paramedian Forehead Flap Text: A decision was made to reconstruct the defect utilizing an interpolation axial flap and a staged reconstruction.  A telfa template was made of the defect.  This telfa template was then used to outline the paramedian forehead pedicle flap.  The donor area for the pedicle flap was then injected with anesthesia.  The flap was excised through the skin and subcutaneous tissue down to the layer of the underlying musculature.  The pedicle flap was carefully excised within this deep plane to maintain its blood supply.  The edges of the donor site were undermined.   The donor site was closed in a primary fashion.  The pedicle was then rotated into position and sutured.  Once the tube was sutured into place, adequate blood supply was confirmed with blanching and refill.  The pedicle was then wrapped with xeroform gauze and dressed appropriately with a telfa and gauze bandage to ensure continued blood supply and protect the attached pedicle.
Complex Repair And Bilobe Flap Text: The defect edges were debeveled with a #15 scalpel blade.  The primary defect was closed partially with a complex linear closure.  Given the location of the remaining defect, shape of the defect and the proximity to free margins a bilobe flap was deemed most appropriate for complete closure of the defect.  Using a sterile surgical marker, an appropriate advancement flap was drawn incorporating the defect and placing the expected incisions within the relaxed skin tension lines where possible.    The area thus outlined was incised deep to adipose tissue with a #15 scalpel blade.  The skin margins were undermined to an appropriate distance in all directions utilizing iris scissors.
Excision Method: Fusiform
Intermediate Repair Preamble Text (Leave Blank If You Do Not Want): Undermining was performed with blunt dissection.
Rotation Flap Text: The defect edges were debeveled with a #15 scalpel blade.  Given the location of the defect, shape of the defect and the proximity to free margins a rotation flap was deemed most appropriate.  Using a sterile surgical marker, an appropriate rotation flap was drawn incorporating the defect and placing the expected incisions within the relaxed skin tension lines where possible.    The area thus outlined was incised deep to adipose tissue with a #15 scalpel blade.  The skin margins were undermined to an appropriate distance in all directions utilizing iris scissors.
Suture Removal: 14 days
Alar Island Pedicle Flap Text: The defect edges were debeveled with a #15 scalpel blade.  Given the location of the defect, shape of the defect and the proximity to the alar rim an island pedicle advancement flap was deemed most appropriate.  Using a sterile surgical marker, an appropriate advancement flap was drawn incorporating the defect, outlining the appropriate donor tissue and placing the expected incisions within the nasal ala running parallel to the alar rim. The area thus outlined was incised with a #15 scalpel blade.  The skin margins were undermined minimally to an appropriate distance in all directions around the primary defect and laterally outward around the island pedicle utilizing iris scissors.  There was minimal undermining beneath the pedicle flap.
Lip Wedge Excision Repair Text: Given the location of the defect and the proximity to free margins a full thickness wedge repair was deemed most appropriate.  Using a sterile surgical marker, the appropriate repair was drawn incorporating the defect and placing the expected incisions perpendicular to the vermillion border.  The vermillion border was also meticulously outlined to ensure appropriate reapproximation during the repair.  The area thus outlined was incised through and through with a #15 scalpel blade.  The muscularis and dermis were reaproximated with deep sutures following hemostasis. Care was taken to realign the vermillion border before proceeding with the superficial closure.  Once the vermillion was realigned the superfical and mucosal closure was finished.
Complex Repair And Melolabial Flap Text: The defect edges were debeveled with a #15 scalpel blade.  The primary defect was closed partially with a complex linear closure.  Given the location of the remaining defect, shape of the defect and the proximity to free margins a melolabial flap was deemed most appropriate for complete closure of the defect.  Using a sterile surgical marker, an appropriate advancement flap was drawn incorporating the defect and placing the expected incisions within the relaxed skin tension lines where possible.    The area thus outlined was incised deep to adipose tissue with a #15 scalpel blade.  The skin margins were undermined to an appropriate distance in all directions utilizing iris scissors.
Epidermal Sutures: 6-0 Monosof
Spiral Flap Text: The defect edges were debeveled with a #15 scalpel blade.  Given the location of the defect, shape of the defect and the proximity to free margins a spiral flap was deemed most appropriate.  Using a sterile surgical marker, an appropriate rotation flap was drawn incorporating the defect and placing the expected incisions within the relaxed skin tension lines where possible. The area thus outlined was incised deep to adipose tissue with a #15 scalpel blade.  The skin margins were undermined to an appropriate distance in all directions utilizing iris scissors.
Double Island Pedicle Flap Text: The defect edges were debeveled with a #15 scalpel blade.  Given the location of the defect, shape of the defect and the proximity to free margins a double island pedicle advancement flap was deemed most appropriate.  Using a sterile surgical marker, an appropriate advancement flap was drawn incorporating the defect, outlining the appropriate donor tissue and placing the expected incisions within the relaxed skin tension lines where possible.    The area thus outlined was incised deep to adipose tissue with a #15 scalpel blade.  The skin margins were undermined to an appropriate distance in all directions around the primary defect and laterally outward around the island pedicle utilizing iris scissors.  There was minimal undermining beneath the pedicle flap.
Ftsg Text: The defect edges were debeveled with a #15 scalpel blade.  Given the location of the defect, shape of the defect and the proximity to free margins a full thickness skin graft was deemed most appropriate.  Using a sterile surgical marker, the primary defect shape was transferred to the donor site. The area thus outlined was incised deep to adipose tissue with a #15 scalpel blade.  The harvested graft was then trimmed of adipose tissue until only dermis and epidermis was left.  The skin margins of the secondary defect were undermined to an appropriate distance in all directions utilizing iris scissors.  The secondary defect was closed with interrupted buried subcutaneous sutures.  The skin edges were then re-apposed with running  sutures.  The skin graft was then placed in the primary defect and oriented appropriately.
Complex Repair And Rotation Flap Text: The defect edges were debeveled with a #15 scalpel blade.  The primary defect was closed partially with a complex linear closure.  Given the location of the remaining defect, shape of the defect and the proximity to free margins a rotation flap was deemed most appropriate for complete closure of the defect.  Using a sterile surgical marker, an appropriate advancement flap was drawn incorporating the defect and placing the expected incisions within the relaxed skin tension lines where possible.    The area thus outlined was incised deep to adipose tissue with a #15 scalpel blade.  The skin margins were undermined to an appropriate distance in all directions utilizing iris scissors.
Consent was obtained from the patient. The risks and benefits to therapy were discussed in detail. Specifically, the risks of infection, scarring, bleeding, prolonged wound healing, incomplete removal, allergy to anesthesia, nerve injury and recurrence were addressed. Prior to the procedure, the treatment site was clearly identified and confirmed by the patient. All components of Universal Protocol/PAUSE Rule completed.
Medical Necessity Clause: This procedure was medically necessary because the lesion that was treated was:
Star Wedge Flap Text: The defect edges were debeveled with a #15 scalpel blade.  Given the location of the defect, shape of the defect and the proximity to free margins a star wedge flap was deemed most appropriate.  Using a sterile surgical marker, an appropriate rotation flap was drawn incorporating the defect and placing the expected incisions within the relaxed skin tension lines where possible. The area thus outlined was incised deep to adipose tissue with a #15 scalpel blade.  The skin margins were undermined to an appropriate distance in all directions utilizing iris scissors.
Island Pedicle Flap-Requiring Vessel Identification Text: The defect edges were debeveled with a #15 scalpel blade.  Given the location of the defect, shape of the defect and the proximity to free margins an island pedicle advancement flap was deemed most appropriate.  Using a sterile surgical marker, an appropriate advancement flap was drawn, based on the axial vessel mentioned above, incorporating the defect, outlining the appropriate donor tissue and placing the expected incisions within the relaxed skin tension lines where possible.    The area thus outlined was incised deep to adipose tissue with a #15 scalpel blade.  The skin margins were undermined to an appropriate distance in all directions around the primary defect and laterally outward around the island pedicle utilizing iris scissors.  There was minimal undermining beneath the pedicle flap.
Split-Thickness Skin Graft Text: The defect edges were debeveled with a #15 scalpel blade.  Given the location of the defect, shape of the defect and the proximity to free margins a split thickness skin graft was deemed most appropriate.  Using a sterile surgical marker, the primary defect shape was transferred to the donor site. The split thickness graft was then harvested.  The skin graft was then placed in the primary defect and oriented appropriately.
Complex Repair And Rhombic Flap Text: The defect edges were debeveled with a #15 scalpel blade.  The primary defect was closed partially with a complex linear closure.  Given the location of the remaining defect, shape of the defect and the proximity to free margins a rhombic flap was deemed most appropriate for complete closure of the defect.  Using a sterile surgical marker, an appropriate advancement flap was drawn incorporating the defect and placing the expected incisions within the relaxed skin tension lines where possible.    The area thus outlined was incised deep to adipose tissue with a #15 scalpel blade.  The skin margins were undermined to an appropriate distance in all directions utilizing iris scissors.
Lab: [id number]
Transposition Flap Text: The defect edges were debeveled with a #15 scalpel blade.  Given the location of the defect and the proximity to free margins a transposition flap was deemed most appropriate.  Using a sterile surgical marker, an appropriate transposition flap was drawn incorporating the defect.    The area thus outlined was incised deep to adipose tissue with a #15 scalpel blade.  The skin margins were undermined to an appropriate distance in all directions utilizing iris scissors.
Keystone Flap Text: The defect edges were debeveled with a #15 scalpel blade.  Given the location of the defect, shape of the defect a keystone flap was deemed most appropriate.  Using a sterile surgical marker, an appropriate keystone flap was drawn incorporating the defect, outlining the appropriate donor tissue and placing the expected incisions within the relaxed skin tension lines where possible. The area thus outlined was incised deep to adipose tissue with a #15 scalpel blade.  The skin margins were undermined to an appropriate distance in all directions around the primary defect and laterally outward around the flap utilizing iris scissors.
Cartilage Graft Text: The defect edges were debeveled with a #15 scalpel blade.  Given the location of the defect, shape of the defect, the fact the defect involved a full thickness cartilage defect a cartilage graft was deemed most appropriate.  An appropriate donor site was identified, cleansed, and anesthetized. The cartilage graft was then harvested and transferred to the recipient site, oriented appropriately and then sutured into place.  The secondary defect was then repaired using a primary closure.
Repair Type: Complex
Complex Repair And Transposition Flap Text: The defect edges were debeveled with a #15 scalpel blade.  The primary defect was closed partially with a complex linear closure.  Given the location of the remaining defect, shape of the defect and the proximity to free margins a transposition flap was deemed most appropriate for complete closure of the defect.  Using a sterile surgical marker, an appropriate advancement flap was drawn incorporating the defect and placing the expected incisions within the relaxed skin tension lines where possible.    The area thus outlined was incised deep to adipose tissue with a #15 scalpel blade.  The skin margins were undermined to an appropriate distance in all directions utilizing iris scissors.
Lab Facility: [id number]
Muscle Hinge Flap Text: The defect edges were debeveled with a #15 scalpel blade.  Given the size, depth and location of the defect and the proximity to free margins a muscle hinge flap was deemed most appropriate.  Using a sterile surgical marker, an appropriate hinge flap was drawn incorporating the defect. The area thus outlined was incised with a #15 scalpel blade.  The skin margins were undermined to an appropriate distance in all directions utilizing iris scissors.
O-T Plasty Text: The defect edges were debeveled with a #15 scalpel blade.  Given the location of the defect, shape of the defect and the proximity to free margins an O-T plasty was deemed most appropriate.  Using a sterile surgical marker, an appropriate O-T plasty was drawn incorporating the defect and placing the expected incisions within the relaxed skin tension lines where possible.    The area thus outlined was incised deep to adipose tissue with a #15 scalpel blade.  The skin margins were undermined to an appropriate distance in all directions utilizing iris scissors.
Composite Graft Text: The defect edges were debeveled with a #15 scalpel blade.  Given the location of the defect, shape of the defect, the proximity to free margins and the fact the defect was full thickness a composite graft was deemed most appropriate.  The defect was outline and then transferred to the donor site.  A full thickness graft was then excised from the donor site. The graft was then placed in the primary defect, oriented appropriately and then sutured into place.  The secondary defect was then repaired using a primary closure.
Complex Repair And V-Y Plasty Text: The defect edges were debeveled with a #15 scalpel blade.  The primary defect was closed partially with a complex linear closure.  Given the location of the remaining defect, shape of the defect and the proximity to free margins a V-Y plasty was deemed most appropriate for complete closure of the defect.  Using a sterile surgical marker, an appropriate advancement flap was drawn incorporating the defect and placing the expected incisions within the relaxed skin tension lines where possible.    The area thus outlined was incised deep to adipose tissue with a #15 scalpel blade.  The skin margins were undermined to an appropriate distance in all directions utilizing iris scissors.
Post-Care Instructions: I reviewed with the patient in detail post-care instructions. Patient is not to engage in any heavy lifting, exercise, or swimming for the next 14 days. Should the patient develop any fevers, chills, bleeding, severe pain patient will contact the office immediately.
Primary Defect Width (In Cm): 1.2
Advancement Flap (Double) Text: The defect edges were debeveled with a #15 scalpel blade.  Given the location of the defect and the proximity to free margins a double advancement flap was deemed most appropriate.  Using a sterile surgical marker, the appropriate advancement flaps were drawn incorporating the defect and placing the expected incisions within the relaxed skin tension lines where possible.    The area thus outlined was incised deep to adipose tissue with a #15 scalpel blade.  The skin margins were undermined to an appropriate distance in all directions utilizing iris scissors.
Melolabial Transposition Flap Text: The defect edges were debeveled with a #15 scalpel blade.  Given the location of the defect and the proximity to free margins a melolabial flap was deemed most appropriate.  Using a sterile surgical marker, an appropriate melolabial transposition flap was drawn incorporating the defect.    The area thus outlined was incised deep to adipose tissue with a #15 scalpel blade.  The skin margins were undermined to an appropriate distance in all directions utilizing iris scissors.
O-Z Plasty Text: The defect edges were debeveled with a #15 scalpel blade.  Given the location of the defect, shape of the defect and the proximity to free margins an O-Z plasty (double transposition flap) was deemed most appropriate.  Using a sterile surgical marker, the appropriate transposition flaps were drawn incorporating the defect and placing the expected incisions within the relaxed skin tension lines where possible.    The area thus outlined was incised deep to adipose tissue with a #15 scalpel blade.  The skin margins were undermined to an appropriate distance in all directions utilizing iris scissors.  Hemostasis was achieved with electrocautery.  The flaps were then transposed into place, one clockwise and the other counterclockwise, and anchored with interrupted buried subcutaneous sutures.
Epidermal Autograft Text: The defect edges were debeveled with a #15 scalpel blade.  Given the location of the defect, shape of the defect and the proximity to free margins an epidermal autograft was deemed most appropriate.  Using a sterile surgical marker, the primary defect shape was transferred to the donor site. The epidermal graft was then harvested.  The skin graft was then placed in the primary defect and oriented appropriately.
Anesthesia Type: 1% lidocaine with epinephrine
Burow's Advancement Flap Text: The defect edges were debeveled with a #15 scalpel blade.  Given the location of the defect and the proximity to free margins a Burow's advancement flap was deemed most appropriate.  Using a sterile surgical marker, the appropriate advancement flap was drawn incorporating the defect and placing the expected incisions within the relaxed skin tension lines where possible.    The area thus outlined was incised deep to adipose tissue with a #15 scalpel blade.  The skin margins were undermined to an appropriate distance in all directions utilizing iris scissors.
Complex Repair And M Plasty Text: The defect edges were debeveled with a #15 scalpel blade.  The primary defect was closed partially with a complex linear closure.  Given the location of the remaining defect, shape of the defect and the proximity to free margins an M plasty was deemed most appropriate for complete closure of the defect.  Using a sterile surgical marker, an appropriate advancement flap was drawn incorporating the defect and placing the expected incisions within the relaxed skin tension lines where possible.    The area thus outlined was incised deep to adipose tissue with a #15 scalpel blade.  The skin margins were undermined to an appropriate distance in all directions utilizing iris scissors.
Intermediate / Complex Repair - Final Wound Length In Cm: 4
Rhombic Flap Text: The defect edges were debeveled with a #15 scalpel blade.  Given the location of the defect and the proximity to free margins a rhombic flap was deemed most appropriate.  Using a sterile surgical marker, an appropriate rhombic flap was drawn incorporating the defect.    The area thus outlined was incised deep to adipose tissue with a #15 scalpel blade.  The skin margins were undermined to an appropriate distance in all directions utilizing iris scissors.
Home Suture Removal Text: Patient was provided a home suture removal kit and will remove their sutures at home.  If they have any questions or difficulties they will call the office.
Double O-Z Plasty Text: The defect edges were debeveled with a #15 scalpel blade.  Given the location of the defect, shape of the defect and the proximity to free margins a Double O-Z plasty (double transposition flap) was deemed most appropriate.  Using a sterile surgical marker, the appropriate transposition flaps were drawn incorporating the defect and placing the expected incisions within the relaxed skin tension lines where possible. The area thus outlined was incised deep to adipose tissue with a #15 scalpel blade.  The skin margins were undermined to an appropriate distance in all directions utilizing iris scissors.  Hemostasis was achieved with electrocautery.  The flaps were then transposed into place, one clockwise and the other counterclockwise, and anchored with interrupted buried subcutaneous sutures.
Dermal Autograft Text: The defect edges were debeveled with a #15 scalpel blade.  Given the location of the defect, shape of the defect and the proximity to free margins a dermal autograft was deemed most appropriate.  Using a sterile surgical marker, the primary defect shape was transferred to the donor site. The area thus outlined was incised deep to adipose tissue with a #15 scalpel blade.  The harvested graft was then trimmed of adipose and epidermal tissue until only dermis was left.  The skin graft was then placed in the primary defect and oriented appropriately.
Previous Accession (Optional): [id number]
Complex Repair And Double M Plasty Text: The defect edges were debeveled with a #15 scalpel blade.  The primary defect was closed partially with a complex linear closure.  Given the location of the remaining defect, shape of the defect and the proximity to free margins a double M plasty was deemed most appropriate for complete closure of the defect.  Using a sterile surgical marker, an appropriate advancement flap was drawn incorporating the defect and placing the expected incisions within the relaxed skin tension lines where possible.    The area thus outlined was incised deep to adipose tissue with a #15 scalpel blade.  The skin margins were undermined to an appropriate distance in all directions utilizing iris scissors.
Rhomboid Transposition Flap Text: The defect edges were debeveled with a #15 scalpel blade.  Given the location of the defect and the proximity to free margins a rhomboid transposition flap was deemed most appropriate.  Using a sterile surgical marker, an appropriate rhomboid flap was drawn incorporating the defect.    The area thus outlined was incised deep to adipose tissue with a #15 scalpel blade.  The skin margins were undermined to an appropriate distance in all directions utilizing iris scissors.
Epidermal Closure: running
Skin Substitute Text: The defect edges were debeveled with a #15 scalpel blade.  Given the location of the defect, shape of the defect and the proximity to free margins a skin substitute graft was deemed most appropriate.  The graft material was trimmed to fit the size of the defect. The graft was then placed in the primary defect and oriented appropriately.
Chonodrocutaneous Helical Advancement Flap Text: The defect edges were debeveled with a #15 scalpel blade.  Given the location of the defect and the proximity to free margins a chondrocutaneous helical advancement flap was deemed most appropriate.  Using a sterile surgical marker, the appropriate advancement flap was drawn incorporating the defect and placing the expected incisions within the relaxed skin tension lines where possible.    The area thus outlined was incised deep to adipose tissue with a #15 scalpel blade.  The skin margins were undermined to an appropriate distance in all directions utilizing iris scissors.
S Plasty Text: Given the location and shape of the defect, and the orientation of relaxed skin tension lines, an S-plasty was deemed most appropriate for repair.  Using a sterile surgical marker, the appropriate outline of the S-plasty was drawn, incorporating the defect and placing the expected incisions within the relaxed skin tension lines where possible.  The area thus outlined was incised deep to adipose tissue with a #15 scalpel blade.  The skin margins were undermined to an appropriate distance in all directions utilizing iris scissors. The skin flaps were advanced over the defect.  The opposing margins were then approximated with interrupted buried subcutaneous sutures.
Complex Repair And W Plasty Text: The defect edges were debeveled with a #15 scalpel blade.  The primary defect was closed partially with a complex linear closure.  Given the location of the remaining defect, shape of the defect and the proximity to free margins a W plasty was deemed most appropriate for complete closure of the defect.  Using a sterile surgical marker, an appropriate advancement flap was drawn incorporating the defect and placing the expected incisions within the relaxed skin tension lines where possible.    The area thus outlined was incised deep to adipose tissue with a #15 scalpel blade.  The skin margins were undermined to an appropriate distance in all directions utilizing iris scissors.
Bi-Rhombic Flap Text: The defect edges were debeveled with a #15 scalpel blade.  Given the location of the defect and the proximity to free margins a bi-rhombic flap was deemed most appropriate.  Using a sterile surgical marker, an appropriate rhombic flap was drawn incorporating the defect. The area thus outlined was incised deep to adipose tissue with a #15 scalpel blade.  The skin margins were undermined to an appropriate distance in all directions utilizing iris scissors.
Crescentic Advancement Flap Text: The defect edges were debeveled with a #15 scalpel blade.  Given the location of the defect and the proximity to free margins a crescentic advancement flap was deemed most appropriate.  Using a sterile surgical marker, the appropriate advancement flap was drawn incorporating the defect and placing the expected incisions within the relaxed skin tension lines where possible.    The area thus outlined was incised deep to adipose tissue with a #15 scalpel blade.  The skin margins were undermined to an appropriate distance in all directions utilizing iris scissors.
V-Y Plasty Text: The defect edges were debeveled with a #15 scalpel blade.  Given the location of the defect, shape of the defect and the proximity to free margins an V-Y advancement flap was deemed most appropriate.  Using a sterile surgical marker, an appropriate advancement flap was drawn incorporating the defect and placing the expected incisions within the relaxed skin tension lines where possible.    The area thus outlined was incised deep to adipose tissue with a #15 scalpel blade.  The skin margins were undermined to an appropriate distance in all directions utilizing iris scissors.
Tissue Cultured Epidermal Autograft Text: The defect edges were debeveled with a #15 scalpel blade.  Given the location of the defect, shape of the defect and the proximity to free margins a tissue cultured epidermal autograft was deemed most appropriate.  The graft was then trimmed to fit the size of the defect.  The graft was then placed in the primary defect and oriented appropriately.
Date Of Previous Biopsy (Optional): 11.13.19
Complex Repair And Z Plasty Text: The defect edges were debeveled with a #15 scalpel blade.  The primary defect was closed partially with a complex linear closure.  Given the location of the remaining defect, shape of the defect and the proximity to free margins a Z plasty was deemed most appropriate for complete closure of the defect.  Using a sterile surgical marker, an appropriate advancement flap was drawn incorporating the defect and placing the expected incisions within the relaxed skin tension lines where possible.    The area thus outlined was incised deep to adipose tissue with a #15 scalpel blade.  The skin margins were undermined to an appropriate distance in all directions utilizing iris scissors.
Fusiform Excision Additional Text (Leave Blank If You Do Not Want): The margin was drawn around the clinically apparent lesion.  A fusiform shape was then drawn on the skin incorporating the lesion and margins.  Incisions were then made along these lines to the appropriate tissue plane and the lesion was extirpated.
A-T Advancement Flap Text: The defect edges were debeveled with a #15 scalpel blade.  Given the location of the defect, shape of the defect and the proximity to free margins an A-T advancement flap was deemed most appropriate.  Using a sterile surgical marker, an appropriate advancement flap was drawn incorporating the defect and placing the expected incisions within the relaxed skin tension lines where possible.    The area thus outlined was incised deep to adipose tissue with a #15 scalpel blade.  The skin margins were undermined to an appropriate distance in all directions utilizing iris scissors.
Helical Rim Advancement Flap Text: The defect edges were debeveled with a #15 blade scalpel.  Given the location of the defect and the proximity to free margins (helical rim) a double helical rim advancement flap was deemed most appropriate.  Using a sterile surgical marker, the appropriate advancement flaps were drawn incorporating the defect and placing the expected incisions between the helical rim and antihelix where possible.  The area thus outlined was incised through and through with a #15 scalpel blade.  With a skin hook and iris scissors, the flaps were gently and sharply undermined and freed up.
H Plasty Text: Given the location of the defect, shape of the defect and the proximity to free margins a H-plasty was deemed most appropriate for repair.  Using a sterile surgical marker, the appropriate advancement arms of the H-plasty were drawn incorporating the defect and placing the expected incisions within the relaxed skin tension lines where possible. The area thus outlined was incised deep to adipose tissue with a #15 scalpel blade. The skin margins were undermined to an appropriate distance in all directions utilizing iris scissors.  The opposing advancement arms were then advanced into place in opposite direction and anchored with interrupted buried subcutaneous sutures.
Xenograft Text: The defect edges were debeveled with a #15 scalpel blade.  Given the location of the defect, shape of the defect and the proximity to free margins a xenograft was deemed most appropriate.  The graft was then trimmed to fit the size of the defect.  The graft was then placed in the primary defect and oriented appropriately.
Referring Physician (Optional): Nando
Complex Repair And Dorsal Nasal Flap Text: The defect edges were debeveled with a #15 scalpel blade.  The primary defect was closed partially with a complex linear closure.  Given the location of the remaining defect, shape of the defect and the proximity to free margins a dorsal nasal flap was deemed most appropriate for complete closure of the defect.  Using a sterile surgical marker, an appropriate flap was drawn incorporating the defect and placing the expected incisions within the relaxed skin tension lines where possible.    The area thus outlined was incised deep to adipose tissue with a #15 scalpel blade.  The skin margins were undermined to an appropriate distance in all directions utilizing iris scissors.
Eliptical Excision Additional Text (Leave Blank If You Do Not Want): The margin was drawn around the clinically apparent lesion.  An elliptical shape was then drawn on the skin incorporating the lesion and margins.  Incisions were then made along these lines to the appropriate tissue plane and the lesion was extirpated.
O-T Advancement Flap Text: The defect edges were debeveled with a #15 scalpel blade.  Given the location of the defect, shape of the defect and the proximity to free margins an O-T advancement flap was deemed most appropriate.  Using a sterile surgical marker, an appropriate advancement flap was drawn incorporating the defect and placing the expected incisions within the relaxed skin tension lines where possible.    The area thus outlined was incised deep to adipose tissue with a #15 scalpel blade.  The skin margins were undermined to an appropriate distance in all directions utilizing iris scissors.
Bilateral Helical Rim Advancement Flap Text: The defect edges were debeveled with a #15 blade scalpel.  Given the location of the defect and the proximity to free margins (helical rim) a bilateral helical rim advancement flap was deemed most appropriate.  Using a sterile surgical marker, the appropriate advancement flaps were drawn incorporating the defect and placing the expected incisions between the helical rim and antihelix where possible.  The area thus outlined was incised through and through with a #15 scalpel blade.  With a skin hook and iris scissors, the flaps were gently and sharply undermined and freed up.
W Plasty Text: The lesion was extirpated to the level of the fat with a #15 scalpel blade.  Given the location of the defect, shape of the defect and the proximity to free margins a W-plasty was deemed most appropriate for repair.  Using a sterile surgical marker, the appropriate transposition arms of the W-plasty were drawn incorporating the defect and placing the expected incisions within the relaxed skin tension lines where possible.    The area thus outlined was incised deep to adipose tissue with a #15 scalpel blade.  The skin margins were undermined to an appropriate distance in all directions utilizing iris scissors.  The opposing transposition arms were then transposed into place in opposite direction and anchored with interrupted buried subcutaneous sutures.
Purse String (Intermediate) Text: Given the location of the defect and the characteristics of the surrounding skin a pursestring intermediate closure was deemed most appropriate.  Undermining was performed circumfirentially around the surgical defect.  A purstring suture was then placed and tightened.
Surgeon (Optional): Heladio
Complex Repair And Ftsg Text: The defect edges were debeveled with a #15 scalpel blade.  The primary defect was closed partially with a complex linear closure.  Given the location of the defect, shape of the defect and the proximity to free margins a full thickness skin graft was deemed most appropriate to repair the remaining defect.  The graft was trimmed to fit the size of the remaining defect.  The graft was then placed in the primary defect, oriented appropriately, and sutured into place.
Wound Care: Vaseline
Saucerization Excision Additional Text (Leave Blank If You Do Not Want): The margin was drawn around the clinically apparent lesion.  Incisions were then made along these lines, in a tangential fashion, to the appropriate tissue plane and the lesion was extirpated.
O-L Flap Text: The defect edges were debeveled with a #15 scalpel blade.  Given the location of the defect, shape of the defect and the proximity to free margins an O-L flap was deemed most appropriate.  Using a sterile surgical marker, an appropriate advancement flap was drawn incorporating the defect and placing the expected incisions within the relaxed skin tension lines where possible.    The area thus outlined was incised deep to adipose tissue with a #15 scalpel blade.  The skin margins were undermined to an appropriate distance in all directions utilizing iris scissors.
Scalpel Size: 15 blade
Ear Star Wedge Flap Text: The defect edges were debeveled with a #15 blade scalpel.  Given the location of the defect and the proximity to free margins (helical rim) an ear star wedge flap was deemed most appropriate.  Using a sterile surgical marker, the appropriate flap was drawn incorporating the defect and placing the expected incisions between the helical rim and antihelix where possible.  The area thus outlined was incised through and through with a #15 scalpel blade.
Z Plasty Text: The lesion was extirpated to the level of the fat with a #15 scalpel blade.  Given the location of the defect, shape of the defect and the proximity to free margins a Z-plasty was deemed most appropriate for repair.  Using a sterile surgical marker, the appropriate transposition arms of the Z-plasty were drawn incorporating the defect and placing the expected incisions within the relaxed skin tension lines where possible.    The area thus outlined was incised deep to adipose tissue with a #15 scalpel blade.  The skin margins were undermined to an appropriate distance in all directions utilizing iris scissors.  The opposing transposition arms were then transposed into place in opposite direction and anchored with interrupted buried subcutaneous sutures.
Purse String (Simple) Text: Given the location of the defect and the characteristics of the surrounding skin a purse string simple closure was deemed most appropriate.  Undermining was performed circumferentially around the surgical defect.  A purse string suture was then placed and tightened.
Deep Sutures: 4-0 Polysorb
Complex Repair And Split-Thickness Skin Graft Text: The defect edges were debeveled with a #15 scalpel blade.  The primary defect was closed partially with a complex linear closure.  Given the location of the defect, shape of the defect and the proximity to free margins a split thickness skin graft was deemed most appropriate to repair the remaining defect.  The graft was trimmed to fit the size of the remaining defect.  The graft was then placed in the primary defect, oriented appropriately, and sutured into place.
Slit Excision Additional Text (Leave Blank If You Do Not Want): A linear line was drawn on the skin overlying the lesion. An incision was made slowly until the lesion was visualized.  Once visualized, the lesion was removed with blunt dissection.
O-Z Flap Text: The defect edges were debeveled with a #15 scalpel blade.  Given the location of the defect, shape of the defect and the proximity to free margins an O-Z flap was deemed most appropriate.  Using a sterile surgical marker, an appropriate transposition flap was drawn incorporating the defect and placing the expected incisions within the relaxed skin tension lines where possible. The area thus outlined was incised deep to adipose tissue with a #15 scalpel blade.  The skin margins were undermined to an appropriate distance in all directions utilizing iris scissors.
Banner Transposition Flap Text: The defect edges were debeveled with a #15 scalpel blade.  Given the location of the defect and the proximity to free margins a Banner transposition flap was deemed most appropriate.  Using a sterile surgical marker, an appropriate flap drawn around the defect. The area thus outlined was incised deep to adipose tissue with a #15 scalpel blade.  The skin margins were undermined to an appropriate distance in all directions utilizing iris scissors.
Cheek Interpolation Flap Text: A decision was made to reconstruct the defect utilizing an interpolation axial flap and a staged reconstruction.  A telfa template was made of the defect.  This telfa template was then used to outline the Cheek Interpolation flap.  The donor area for the pedicle flap was then injected with anesthesia.  The flap was excised through the skin and subcutaneous tissue down to the layer of the underlying musculature.  The interpolation flap was carefully excised within this deep plane to maintain its blood supply.  The edges of the donor site were undermined.   The donor site was closed in a primary fashion.  The pedicle was then rotated into position and sutured.  Once the tube was sutured into place, adequate blood supply was confirmed with blanching and refill.  The pedicle was then wrapped with xeroform gauze and dressed appropriately with a telfa and gauze bandage to ensure continued blood supply and protect the attached pedicle.
Complex Repair And Single Advancement Flap Text: The defect edges were debeveled with a #15 scalpel blade.  The primary defect was closed partially with a complex linear closure.  Given the location of the remaining defect, shape of the defect and the proximity to free margins a single advancement flap was deemed most appropriate for complete closure of the defect.  Using a sterile surgical marker, an appropriate advancement flap was drawn incorporating the defect and placing the expected incisions within the relaxed skin tension lines where possible.    The area thus outlined was incised deep to adipose tissue with a #15 scalpel blade.  The skin margins were undermined to an appropriate distance in all directions utilizing iris scissors.
Dressing: telfa dressing
Complex Repair And Epidermal Autograft Text: The defect edges were debeveled with a #15 scalpel blade.  The primary defect was closed partially with a complex linear closure.  Given the location of the defect, shape of the defect and the proximity to free margins an epidermal autograft was deemed most appropriate to repair the remaining defect.  The graft was trimmed to fit the size of the remaining defect.  The graft was then placed in the primary defect, oriented appropriately, and sutured into place.
Excisional Biopsy Additional Text (Leave Blank If You Do Not Want): The margin was drawn around the clinically apparent lesion. An elliptical shape was then drawn on the skin incorporating the lesion and margins.  Incisions were then made along these lines to the appropriate tissue plane and the lesion was extirpated.
Billing Type: Third-Party Bill
Excision Depth: adipose tissue
Double O-Z Flap Text: The defect edges were debeveled with a #15 scalpel blade.  Given the location of the defect, shape of the defect and the proximity to free margins a Double O-Z flap was deemed most appropriate.  Using a sterile surgical marker, an appropriate transposition flap was drawn incorporating the defect and placing the expected incisions within the relaxed skin tension lines where possible. The area thus outlined was incised deep to adipose tissue with a #15 scalpel blade.  The skin margins were undermined to an appropriate distance in all directions utilizing iris scissors.
Bilobed Flap Text: The defect edges were debeveled with a #15 scalpel blade.  Given the location of the defect and the proximity to free margins a bilobe flap was deemed most appropriate.  Using a sterile surgical marker, an appropriate bilobe flap drawn around the defect.    The area thus outlined was incised deep to adipose tissue with a #15 scalpel blade.  The skin margins were undermined to an appropriate distance in all directions utilizing iris scissors.
Cheek-To-Nose Interpolation Flap Text: A decision was made to reconstruct the defect utilizing an interpolation axial flap and a staged reconstruction.  A telfa template was made of the defect.  This telfa template was then used to outline the Cheek-To-Nose Interpolation flap.  The donor area for the pedicle flap was then injected with anesthesia.  The flap was excised through the skin and subcutaneous tissue down to the layer of the underlying musculature.  The interpolation flap was carefully excised within this deep plane to maintain its blood supply.  The edges of the donor site were undermined.   The donor site was closed in a primary fashion.  The pedicle was then rotated into position and sutured.  Once the tube was sutured into place, adequate blood supply was confirmed with blanching and refill.  The pedicle was then wrapped with xeroform gauze and dressed appropriately with a telfa and gauze bandage to ensure continued blood supply and protect the attached pedicle.
Medical Necessity Information: It is in your best interest to select a reason for this procedure from the list below. All of these items fulfill various CMS LCD requirements except lesion extends to a margin.
Complex Repair And Double Advancement Flap Text: The defect edges were debeveled with a #15 scalpel blade.  The primary defect was closed partially with a complex linear closure.  Given the location of the remaining defect, shape of the defect and the proximity to free margins a double advancement flap was deemed most appropriate for complete closure of the defect.  Using a sterile surgical marker, an appropriate advancement flap was drawn incorporating the defect and placing the expected incisions within the relaxed skin tension lines where possible.    The area thus outlined was incised deep to adipose tissue with a #15 scalpel blade.  The skin margins were undermined to an appropriate distance in all directions utilizing iris scissors.
Complex Repair And Dermal Autograft Text: The defect edges were debeveled with a #15 scalpel blade.  The primary defect was closed partially with a complex linear closure.  Given the location of the defect, shape of the defect and the proximity to free margins an dermal autograft was deemed most appropriate to repair the remaining defect.  The graft was trimmed to fit the size of the remaining defect.  The graft was then placed in the primary defect, oriented appropriately, and sutured into place.
Perilesional Excision Additional Text (Leave Blank If You Do Not Want): The margin was drawn around the clinically apparent lesion. Incisions were then made along these lines to the appropriate tissue plane and the lesion was extirpated.
V-Y Flap Text: The defect edges were debeveled with a #15 scalpel blade.  Given the location of the defect, shape of the defect and the proximity to free margins a V-Y flap was deemed most appropriate.  Using a sterile surgical marker, an appropriate advancement flap was drawn incorporating the defect and placing the expected incisions within the relaxed skin tension lines where possible.    The area thus outlined was incised deep to adipose tissue with a #15 scalpel blade.  The skin margins were undermined to an appropriate distance in all directions utilizing iris scissors.
Bilobed Transposition Flap Text: The defect edges were debeveled with a #15 scalpel blade.  Given the location of the defect and the proximity to free margins a bilobed transposition flap was deemed most appropriate.  Using a sterile surgical marker, an appropriate bilobe flap drawn around the defect.    The area thus outlined was incised deep to adipose tissue with a #15 scalpel blade.  The skin margins were undermined to an appropriate distance in all directions utilizing iris scissors.
Information: Selecting Yes will display possible errors in your note based on the variables you have selected. This validation is only offered as a suggestion for you. PLEASE NOTE THAT THE VALIDATION TEXT WILL BE REMOVED WHEN YOU FINALIZE YOUR NOTE. IF YOU WANT TO FAX A PRELIMINARY NOTE YOU WILL NEED TO TOGGLE THIS TO 'NO' IF YOU DO NOT WANT IT IN YOUR FAXED NOTE.
Interpolation Flap Text: A decision was made to reconstruct the defect utilizing an interpolation axial flap and a staged reconstruction.  A telfa template was made of the defect.  This telfa template was then used to outline the interpolation flap.  The donor area for the pedicle flap was then injected with anesthesia.  The flap was excised through the skin and subcutaneous tissue down to the layer of the underlying musculature.  The interpolation flap was carefully excised within this deep plane to maintain its blood supply.  The edges of the donor site were undermined.   The donor site was closed in a primary fashion.  The pedicle was then rotated into position and sutured.  Once the tube was sutured into place, adequate blood supply was confirmed with blanching and refill.  The pedicle was then wrapped with xeroform gauze and dressed appropriately with a telfa and gauze bandage to ensure continued blood supply and protect the attached pedicle.
Complex Repair And Modified Advancement Flap Text: The defect edges were debeveled with a #15 scalpel blade.  The primary defect was closed partially with a complex linear closure.  Given the location of the remaining defect, shape of the defect and the proximity to free margins a modified advancement flap was deemed most appropriate for complete closure of the defect.  Using a sterile surgical marker, an appropriate advancement flap was drawn incorporating the defect and placing the expected incisions within the relaxed skin tension lines where possible.    The area thus outlined was incised deep to adipose tissue with a #15 scalpel blade.  The skin margins were undermined to an appropriate distance in all directions utilizing iris scissors.
Complex Repair And Tissue Cultured Epidermal Autograft Text: The defect edges were debeveled with a #15 scalpel blade.  The primary defect was closed partially with a complex linear closure.  Given the location of the defect, shape of the defect and the proximity to free margins an tissue cultured epidermal autograft was deemed most appropriate to repair the remaining defect.  The graft was trimmed to fit the size of the remaining defect.  The graft was then placed in the primary defect, oriented appropriately, and sutured into place.
Estimated Blood Loss (Cc): minimal
Mercedes Flap Text: The defect edges were debeveled with a #15 scalpel blade.  Given the location of the defect, shape of the defect and the proximity to free margins a Mercedes flap was deemed most appropriate.  Using a sterile surgical marker, an appropriate advancement flap was drawn incorporating the defect and placing the expected incisions within the relaxed skin tension lines where possible. The area thus outlined was incised deep to adipose tissue with a #15 scalpel blade.  The skin margins were undermined to an appropriate distance in all directions utilizing iris scissors.
Detail Level: Detailed
Where Do You Want The Question To Include Opioid Counseling Located?: Case Summary Tab
Trilobed Flap Text: The defect edges were debeveled with a #15 scalpel blade.  Given the location of the defect and the proximity to free margins a trilobed flap was deemed most appropriate.  Using a sterile surgical marker, an appropriate trilobed flap drawn around the defect.    The area thus outlined was incised deep to adipose tissue with a #15 scalpel blade.  The skin margins were undermined to an appropriate distance in all directions utilizing iris scissors.
Melolabial Interpolation Flap Text: A decision was made to reconstruct the defect utilizing an interpolation axial flap and a staged reconstruction.  A telfa template was made of the defect.  This telfa template was then used to outline the melolabial interpolation flap.  The donor area for the pedicle flap was then injected with anesthesia.  The flap was excised through the skin and subcutaneous tissue down to the layer of the underlying musculature.  The pedicle flap was carefully excised within this deep plane to maintain its blood supply.  The edges of the donor site were undermined.   The donor site was closed in a primary fashion.  The pedicle was then rotated into position and sutured.  Once the tube was sutured into place, adequate blood supply was confirmed with blanching and refill.  The pedicle was then wrapped with xeroform gauze and dressed appropriately with a telfa and gauze bandage to ensure continued blood supply and protect the attached pedicle.
Complex Repair And A-T Advancement Flap Text: The defect edges were debeveled with a #15 scalpel blade.  The primary defect was closed partially with a complex linear closure.  Given the location of the remaining defect, shape of the defect and the proximity to free margins an A-T advancement flap was deemed most appropriate for complete closure of the defect.  Using a sterile surgical marker, an appropriate advancement flap was drawn incorporating the defect and placing the expected incisions within the relaxed skin tension lines where possible.    The area thus outlined was incised deep to adipose tissue with a #15 scalpel blade.  The skin margins were undermined to an appropriate distance in all directions utilizing iris scissors.
Repair Performed By Another Provider Text (Leave Blank If You Do Not Want): After the tissue was excised the defect was repaired by another provider.
Graft Donor Site Bandage (Optional-Leave Blank If You Don't Want In Note): Steri-strips and a pressure bandage were applied to the donor site.
Modified Advancement Flap Text: The defect edges were debeveled with a #15 scalpel blade.  Given the location of the defect, shape of the defect and the proximity to free margins a modified advancement flap was deemed most appropriate.  Using a sterile surgical marker, an appropriate advancement flap was drawn incorporating the defect and placing the expected incisions within the relaxed skin tension lines where possible.    The area thus outlined was incised deep to adipose tissue with a #15 scalpel blade.  The skin margins were undermined to an appropriate distance in all directions utilizing iris scissors.
Dorsal Nasal Flap Text: The defect edges were debeveled with a #15 scalpel blade.  Given the location of the defect and the proximity to free margins a dorsal nasal flap was deemed most appropriate.  Using a sterile surgical marker, an appropriate dorsal nasal flap was drawn around the defect.    The area thus outlined was incised deep to adipose tissue with a #15 scalpel blade.  The skin margins were undermined to an appropriate distance in all directions utilizing iris scissors.
Mastoid Interpolation Flap Text: A decision was made to reconstruct the defect utilizing an interpolation axial flap and a staged reconstruction.  A telfa template was made of the defect.  This telfa template was then used to outline the mastoid interpolation flap.  The donor area for the pedicle flap was then injected with anesthesia.  The flap was excised through the skin and subcutaneous tissue down to the layer of the underlying musculature.  The pedicle flap was carefully excised within this deep plane to maintain its blood supply.  The edges of the donor site were undermined.   The donor site was closed in a primary fashion.  The pedicle was then rotated into position and sutured.  Once the tube was sutured into place, adequate blood supply was confirmed with blanching and refill.  The pedicle was then wrapped with xeroform gauze and dressed appropriately with a telfa and gauze bandage to ensure continued blood supply and protect the attached pedicle.

## 2019-12-03 ENCOUNTER — APPOINTMENT (RX ONLY)
Dept: URBAN - METROPOLITAN AREA CLINIC 5 | Facility: CLINIC | Age: 74
Setting detail: DERMATOLOGY
End: 2019-12-03

## 2019-12-03 DIAGNOSIS — Z48.02 ENCOUNTER FOR REMOVAL OF SUTURES: ICD-10-CM

## 2019-12-03 PROCEDURE — ? SUTURE REMOVAL (GLOBAL PERIOD)

## 2019-12-03 PROCEDURE — 99024 POSTOP FOLLOW-UP VISIT: CPT

## 2019-12-03 ASSESSMENT — LOCATION SIMPLE DESCRIPTION DERM: LOCATION SIMPLE: LEFT UPPER BACK

## 2019-12-03 ASSESSMENT — LOCATION ZONE DERM: LOCATION ZONE: TRUNK

## 2019-12-03 ASSESSMENT — LOCATION DETAILED DESCRIPTION DERM: LOCATION DETAILED: LEFT SUPERIOR UPPER BACK

## 2020-09-03 ENCOUNTER — APPOINTMENT (RX ONLY)
Dept: URBAN - METROPOLITAN AREA CLINIC 38 | Facility: CLINIC | Age: 75
Setting detail: DERMATOLOGY
End: 2020-09-03

## 2020-09-03 DIAGNOSIS — Z71.89 OTHER SPECIFIED COUNSELING: ICD-10-CM

## 2020-09-03 DIAGNOSIS — L81.4 OTHER MELANIN HYPERPIGMENTATION: ICD-10-CM

## 2020-09-03 DIAGNOSIS — L85.3 XEROSIS CUTIS: ICD-10-CM

## 2020-09-03 DIAGNOSIS — L57.0 ACTINIC KERATOSIS: ICD-10-CM

## 2020-09-03 DIAGNOSIS — D22 MELANOCYTIC NEVI: ICD-10-CM

## 2020-09-03 DIAGNOSIS — L82.1 OTHER SEBORRHEIC KERATOSIS: ICD-10-CM

## 2020-09-03 DIAGNOSIS — D18.0 HEMANGIOMA: ICD-10-CM

## 2020-09-03 PROBLEM — D18.01 HEMANGIOMA OF SKIN AND SUBCUTANEOUS TISSUE: Status: ACTIVE | Noted: 2020-09-03

## 2020-09-03 PROBLEM — D22.9 MELANOCYTIC NEVI, UNSPECIFIED: Status: ACTIVE | Noted: 2020-09-03

## 2020-09-03 PROCEDURE — 17000 DESTRUCT PREMALG LESION: CPT

## 2020-09-03 PROCEDURE — ? COUNSELING

## 2020-09-03 PROCEDURE — 17003 DESTRUCT PREMALG LES 2-14: CPT

## 2020-09-03 PROCEDURE — ? LIQUID NITROGEN

## 2020-09-03 PROCEDURE — 99214 OFFICE O/P EST MOD 30 MIN: CPT | Mod: 25

## 2020-09-03 ASSESSMENT — LOCATION SIMPLE DESCRIPTION DERM
LOCATION SIMPLE: RIGHT FOREARM
LOCATION SIMPLE: RIGHT UPPER ARM
LOCATION SIMPLE: POSTERIOR SCALP
LOCATION SIMPLE: LEFT UPPER BACK
LOCATION SIMPLE: LEFT UPPER ARM
LOCATION SIMPLE: ABDOMEN
LOCATION SIMPLE: RIGHT CHEEK
LOCATION SIMPLE: LEFT THIGH
LOCATION SIMPLE: RIGHT UPPER BACK
LOCATION SIMPLE: LEFT CHEEK
LOCATION SIMPLE: LEFT FOREHEAD
LOCATION SIMPLE: UPPER BACK
LOCATION SIMPLE: LEFT FOREARM
LOCATION SIMPLE: SCALP

## 2020-09-03 ASSESSMENT — LOCATION DETAILED DESCRIPTION DERM
LOCATION DETAILED: LEFT VENTRAL DISTAL FOREARM
LOCATION DETAILED: LEFT MEDIAL FOREHEAD
LOCATION DETAILED: LEFT INFERIOR CENTRAL MALAR CHEEK
LOCATION DETAILED: LEFT ANTERIOR DISTAL THIGH
LOCATION DETAILED: INFERIOR THORACIC SPINE
LOCATION DETAILED: RIGHT VENTRAL MEDIAL PROXIMAL FOREARM
LOCATION DETAILED: LEFT PROXIMAL DORSAL FOREARM
LOCATION DETAILED: RIGHT PROXIMAL DORSAL FOREARM
LOCATION DETAILED: RIGHT ANTERIOR DISTAL UPPER ARM
LOCATION DETAILED: LEFT ANTERIOR DISTAL UPPER ARM
LOCATION DETAILED: LEFT SUPERIOR PARIETAL SCALP
LOCATION DETAILED: RIGHT SUPERIOR OCCIPITAL SCALP
LOCATION DETAILED: RIGHT CENTRAL MALAR CHEEK
LOCATION DETAILED: EPIGASTRIC SKIN
LOCATION DETAILED: RIGHT SUPERIOR UPPER BACK
LOCATION DETAILED: LEFT INFERIOR UPPER BACK

## 2020-09-03 ASSESSMENT — LOCATION ZONE DERM
LOCATION ZONE: SCALP
LOCATION ZONE: ARM
LOCATION ZONE: TRUNK
LOCATION ZONE: LEG
LOCATION ZONE: FACE

## 2021-01-15 DIAGNOSIS — Z23 NEED FOR VACCINATION: ICD-10-CM

## 2021-02-10 ENCOUNTER — APPOINTMENT (OUTPATIENT)
Dept: FAMILY PLANNING/WOMEN'S HEALTH CLINIC | Facility: IMMUNIZATION CENTER | Age: 76
End: 2021-02-10
Attending: INTERNAL MEDICINE
Payer: MEDICARE

## 2021-02-10 ENCOUNTER — IMMUNIZATION (OUTPATIENT)
Dept: FAMILY PLANNING/WOMEN'S HEALTH CLINIC | Facility: IMMUNIZATION CENTER | Age: 76
End: 2021-02-10
Payer: MEDICARE

## 2021-02-10 DIAGNOSIS — Z23 ENCOUNTER FOR VACCINATION: Primary | ICD-10-CM

## 2021-02-10 DIAGNOSIS — Z23 NEED FOR VACCINATION: ICD-10-CM

## 2021-02-10 PROCEDURE — 0001A PFIZER SARS-COV-2 VACCINE: CPT

## 2021-02-10 PROCEDURE — 91300 PFIZER SARS-COV-2 VACCINE: CPT

## 2021-03-03 ENCOUNTER — IMMUNIZATION (OUTPATIENT)
Dept: FAMILY PLANNING/WOMEN'S HEALTH CLINIC | Facility: IMMUNIZATION CENTER | Age: 76
End: 2021-03-03
Attending: INTERNAL MEDICINE
Payer: MEDICARE

## 2021-03-03 DIAGNOSIS — Z23 ENCOUNTER FOR VACCINATION: Primary | ICD-10-CM

## 2021-03-03 PROCEDURE — 91300 PFIZER SARS-COV-2 VACCINE: CPT | Performed by: INTERNAL MEDICINE

## 2021-03-03 PROCEDURE — 0002A PFIZER SARS-COV-2 VACCINE: CPT | Performed by: INTERNAL MEDICINE

## 2021-12-29 ENCOUNTER — OFFICE VISIT (OUTPATIENT)
Dept: SLEEP MEDICINE | Facility: MEDICAL CENTER | Age: 76
End: 2021-12-29
Payer: MEDICARE

## 2021-12-29 VITALS
DIASTOLIC BLOOD PRESSURE: 70 MMHG | WEIGHT: 280 LBS | HEART RATE: 58 BPM | BODY MASS INDEX: 39.2 KG/M2 | SYSTOLIC BLOOD PRESSURE: 150 MMHG | OXYGEN SATURATION: 94 % | RESPIRATION RATE: 16 BRPM | HEIGHT: 71 IN

## 2021-12-29 DIAGNOSIS — I10 ESSENTIAL HYPERTENSION: ICD-10-CM

## 2021-12-29 DIAGNOSIS — G47.33 OSA ON CPAP: ICD-10-CM

## 2021-12-29 PROCEDURE — 99204 OFFICE O/P NEW MOD 45 MIN: CPT | Performed by: PREVENTIVE MEDICINE

## 2021-12-29 RX ORDER — TAMSULOSIN HYDROCHLORIDE 0.4 MG/1
CAPSULE ORAL
COMMUNITY
Start: 2021-12-04 | End: 2022-06-20 | Stop reason: SDUPTHER

## 2021-12-29 RX ORDER — OLMESARTAN MEDOXOMIL 20 MG/1
20 TABLET ORAL DAILY
COMMUNITY
Start: 2021-11-05 | End: 2022-11-02

## 2021-12-29 RX ORDER — FINASTERIDE 5 MG/1
5 TABLET, FILM COATED ORAL DAILY
COMMUNITY
Start: 2021-11-07 | End: 2022-08-16 | Stop reason: SDUPTHER

## 2021-12-29 RX ORDER — ROSUVASTATIN CALCIUM 10 MG/1
10 TABLET, COATED ORAL DAILY
COMMUNITY
Start: 2021-11-15 | End: 2022-11-08 | Stop reason: SDUPTHER

## 2021-12-29 NOTE — PROGRESS NOTES
"CC: \" I had sleep studies done here at [facility] 20 years ago I have been using Pap for the entire time.  Pretty sure I have a ResMed machine.\"      HPI:  Andrei Galeas is a 76 y.o.male  kindly referred by Aleks Moreno D.O. This patient had a sleep study by [facility] approximately 15 or 20 years ago.  He has been using Pap therapy since that time.  He has recently moved back to [place] full-time and is establishing care in sleep medicine at [facility].  He is interested in continuing to use is [place] [facility] called [employer].  He believes his machine is 4 to 5 years old.  He did not bring his machine for any paperwork today.    His past medical history includes essential hypertension, hyperlipidemia, obesity class II BMI of 39.05 and possible prediabetes.    COMPLIANCE DATA: PENDING patient to bring in machine for compliance download this week or next  Machine type:  Date range:  AHI:  TIME USED:  PRESSURE SETTINGS:  LEAK:  OTHER:    Sleep study results: Patient indicated that if we cannot find the sleep studies results through [facility] then we should reach out to [facility].    Patient Active Problem List    Diagnosis Date Noted   • HTN (hypertension) 09/13/2013   • Other and unspecified hyperlipidemia 09/13/2013   • Obesity, Class II, BMI 35-39.9, with comorbidity 09/13/2013   • Sleep apnea 09/13/2013       Past Medical History:   Diagnosis Date   • Arthritis    • Back pain    • Cataract    • Depression    • Diabetes (HCC)     borderline   • Hypertension    • Sinusitis    • Sleep apnea         Past Surgical History:   Procedure Laterality Date   • SINUSOTOMIES         Family History   Problem Relation Age of Onset   • Heart Disease Paternal Uncle         MI   • Arthritis Mother    • Psychiatric Illness Father         stomach cancer   • Hypertension Father    • Hyperlipidemia Father    • Stroke Father        Social History     Socioeconomic History   • Marital status:      Spouse name: Not on file   • Number of children: Not on " file   • Years of education: Not on file   • Highest education level: Not on file   Occupational History   • Not on file   Tobacco Use   • Smoking status: Former Smoker     Packs/day: 2.00     Years: 10.00     Pack years: 20.00     Quit date: 1977     Years since quittin.3   • Smokeless tobacco: Never Used   Vaping Use   • Vaping Use: Never used   Substance and Sexual Activity   • Alcohol use: Yes     Comment: 1-2 drinks every week or two   • Drug use: No   • Sexual activity: Not on file   Other Topics Concern   • Not on file   Social History Narrative   • Not on file     Social Determinants of Health     Financial Resource Strain:    • Difficulty of Paying Living Expenses: Not on file   Food Insecurity:    • Worried About Running Out of Food in the Last Year: Not on file   • Ran Out of Food in the Last Year: Not on file   Transportation Needs:    • Lack of Transportation (Medical): Not on file   • Lack of Transportation (Non-Medical): Not on file   Physical Activity:    • Days of Exercise per Week: Not on file   • Minutes of Exercise per Session: Not on file   Stress:    • Feeling of Stress : Not on file   Social Connections:    • Frequency of Communication with Friends and Family: Not on file   • Frequency of Social Gatherings with Friends and Family: Not on file   • Attends [employer] Services: Not on file   • Active Member of Clubs or Organizations: Not on file   • Attends Club or Organization Meetings: Not on file   • Marital Status: Not on file   Intimate Partner Violence:    • Fear of Current or Ex-Partner: Not on file   • Emotionally Abused: Not on file   • Physically Abused: Not on file   • Sexually Abused: Not on file   Housing Stability:    • Unable to Pay for Housing in the Last Year: Not on file   • Number of Places Lived in the Last Year: Not on file   • Unstable Housing in the Last Year: Not on file       Current Outpatient Medications   Medication Sig Dispense Refill   • rosuvastatin  "(CRESTOR) 10 MG Tab      • olmesartan (BENICAR) 20 MG Tab      • finasteride (PROSCAR) 5 MG Tab      • tamsulosin (FLOMAX) 0.4 MG capsule TAKE 2 CAPSULES BY MOUTH ONCE DAILY AS DIRECTED AFTER A MEAL IN THE EVENING     • fexofenadine (ALLEGRA) 60 MG Tab Take 60 mg by mouth every day.     • multivitamin (THERAGRAN) Tab Take 1 Tab by mouth every day.     • fluticasone (FLONASE) 50 MCG/ACT nasal spray Spray 1 Spray in nose every day.     • amlodipine (NORVASC) 2.5 MG Tab Take 1 Tab by mouth every day. 30 Tab 0   • losartan (COZAAR) 100 MG TABS Take 1 Tab by mouth every day. 90 Tab 3   • aspirin EC (ECOTRIN) 81 MG TBEC Take 81 mg by mouth every evening.       No current facility-administered medications for this visit.    \"CURRENT RX\"    ALLERGIES: Penicillins    ROS    Constitutional: Denies  weight loss, endorses minimal daytime fatigue  Cardiovascular: Denies chest pain, tightness, palpitations, swelling in legs/feet, difficulty breathing when lying down but gets better when sitting up.   Respiratory: Endorses shortness of breath during the day with exertion  Sleep: per HPI  Gastrointestinal: Denies  difficulty swallowing, heartburn.  Musculoskeletal: Denies painful joints, sore muscles, back pain.        PHYSICAL EXAM    NECK CIRCUMFERENCE: 19 inches  /70 (BP Location: Left arm, Patient Position: Sitting, BP Cuff Size: Large adult)   Pulse (!) 58   Resp 16   Ht 1.803 m (5' 11\")   Wt (!) 127 kg (280 lb)   SpO2 94%   BMI 39.05 kg/m²   Appearance: Obese, looks stated age, no acute distress  Eyes:   EOMI  ENMT: masked  Neck: Supple, trachea midline, no masses  Respiratory effort:  No intercostal retractions or use of accessory muscles  Lung auscultation:  No wheezes rhonchi rubs or rales  Cardiac: No murmurs, rubs, or gallops; regular rhythm, normal rate; no edema  Musculoskeletal:  Grossly normal; gait and station normal  Neurologic: oriented to person, time, place, and purpose; judgement " intact  Psychiatric:  No depression, anxiety, agitation      Medical Decision Making       The medical record was reviewed in its entirety including the referral notes, records from primary care, consultants notes, hospital records, lab, imaging, microbiology, immunology, and immunizations.  Care gaps identified and reviewed with the patient.    Diagnostic and titration nocturnal polysomnogram's, home sleep apnea tests, continuous nocturnal oximetry results, multiple sleep latency tests, and compliance reports reviewed.    ASSESSMENT/PLAN:    1. Essential hypertension    - DME Mask and Supplies    2. KATIA on CPAP patient reports good results and many benefits from his PAP therapy.  He will bring in his machine this week or next week between 3 and 430 for a compliance download.    - DME Mask and Supplies  - need compliance download  - will need to get copies of SS from [facility] or [facility] in [place]    Has been advised to continue the current Pap Therapy.  Also advised to clean equipment frequently, and get new mask and supplies as allowed by insurance and DME.  Patient was advised to NEVER use  OZONE containing cleaning systems such as So Clean.    The risks of untreated sleep apnea were discussed with the patient at length. Patients with KATIA are at increased risk of cardiovascular disease including coronary artery disease, systemic arterial hypertension, pulmonary arterial hypertension, cardiac arrhythmias, and stroke. KATIA patients have an increased risk of motor vehicle accidents, type 2 diabetes, chronic kidney disease, and non-alcoholic liver disease. The patient was advised to avoid driving a motor vehicle when drowsy.      Have advised the patient to follow up with the appropriate healthcare practitioners for all other medical problems and issues.      Return to clinic as long as we can get a compliance download and access to his sleep studies patient can resume a once a year visit status

## 2022-01-17 ENCOUNTER — TELEPHONE (OUTPATIENT)
Dept: SCHEDULING | Facility: IMAGING CENTER | Age: 77
End: 2022-01-17

## 2022-03-09 ENCOUNTER — APPOINTMENT (RX ONLY)
Dept: URBAN - METROPOLITAN AREA CLINIC 4 | Facility: CLINIC | Age: 77
Setting detail: DERMATOLOGY
End: 2022-03-09

## 2022-03-09 DIAGNOSIS — R21 RASH AND OTHER NONSPECIFIC SKIN ERUPTION: ICD-10-CM

## 2022-03-09 DIAGNOSIS — L21.8 OTHER SEBORRHEIC DERMATITIS: ICD-10-CM

## 2022-03-09 DIAGNOSIS — Z71.89 OTHER SPECIFIED COUNSELING: ICD-10-CM

## 2022-03-09 DIAGNOSIS — R20.2 PARESTHESIA OF SKIN: ICD-10-CM

## 2022-03-09 DIAGNOSIS — L81.4 OTHER MELANIN HYPERPIGMENTATION: ICD-10-CM

## 2022-03-09 DIAGNOSIS — L82.1 OTHER SEBORRHEIC KERATOSIS: ICD-10-CM

## 2022-03-09 PROCEDURE — ? PRESCRIPTION

## 2022-03-09 PROCEDURE — ? COUNSELING

## 2022-03-09 PROCEDURE — 99213 OFFICE O/P EST LOW 20 MIN: CPT

## 2022-03-09 PROCEDURE — ? DIAGNOSIS COMMENT

## 2022-03-09 RX ORDER — KETOCONAZOLE 20 MG/ML
SHAMPOO, SUSPENSION TOPICAL TIW
Qty: 120 | Refills: 4 | Status: ERX | COMMUNITY
Start: 2022-03-09

## 2022-03-09 RX ORDER — TRIAMCINOLONE ACETONIDE 1 MG/G
OINTMENT TOPICAL BID
Qty: 80 | Refills: 0 | Status: ERX | COMMUNITY
Start: 2022-03-09

## 2022-03-09 RX ADMIN — KETOCONAZOLE: 20 SHAMPOO, SUSPENSION TOPICAL at 00:00

## 2022-03-09 RX ADMIN — TRIAMCINOLONE ACETONIDE: 1 OINTMENT TOPICAL at 00:00

## 2022-03-09 ASSESSMENT — LOCATION SIMPLE DESCRIPTION DERM
LOCATION SIMPLE: LEFT SHOULDER
LOCATION SIMPLE: RIGHT UPPER BACK
LOCATION SIMPLE: RIGHT SHOULDER
LOCATION SIMPLE: CHEST
LOCATION SIMPLE: POSTERIOR NECK
LOCATION SIMPLE: RIGHT EAR

## 2022-03-09 ASSESSMENT — LOCATION ZONE DERM
LOCATION ZONE: EAR
LOCATION ZONE: TRUNK
LOCATION ZONE: NECK
LOCATION ZONE: ARM

## 2022-03-09 ASSESSMENT — LOCATION DETAILED DESCRIPTION DERM
LOCATION DETAILED: STERNUM
LOCATION DETAILED: RIGHT MEDIAL TRAPEZIAL NECK
LOCATION DETAILED: RIGHT ANTERIOR SHOULDER
LOCATION DETAILED: RIGHT POSTERIOR SHOULDER
LOCATION DETAILED: LEFT POSTERIOR SHOULDER
LOCATION DETAILED: LEFT ANTERIOR SHOULDER
LOCATION DETAILED: RIGHT SUPERIOR MEDIAL UPPER BACK
LOCATION DETAILED: RIGHT POSTERIOR EAR

## 2022-03-09 NOTE — PROCEDURE: DIAGNOSIS COMMENT
Comment: Itching Does not keep him up at night. Sleeps on his right arm. Likely nerve related. Trial of triamcinolone.
Render Risk Assessment In Note?: no
Detail Level: Simple
Comment: PPD vs eczema. Trial of triamcinolone bid for 2 - 3 weeks. Will schedule to follow up in 4 weeks

## 2022-03-28 ENCOUNTER — OFFICE VISIT (OUTPATIENT)
Dept: SLEEP MEDICINE | Facility: MEDICAL CENTER | Age: 77
End: 2022-03-28
Payer: MEDICARE

## 2022-03-28 VITALS
OXYGEN SATURATION: 95 % | DIASTOLIC BLOOD PRESSURE: 66 MMHG | HEIGHT: 71 IN | WEIGHT: 284.38 LBS | HEART RATE: 66 BPM | BODY MASS INDEX: 39.81 KG/M2 | SYSTOLIC BLOOD PRESSURE: 154 MMHG

## 2022-03-28 DIAGNOSIS — R06.09 DYSPNEA ON EXERTION: ICD-10-CM

## 2022-03-28 PROCEDURE — 99204 OFFICE O/P NEW MOD 45 MIN: CPT | Performed by: INTERNAL MEDICINE

## 2022-03-28 RX ORDER — TIOTROPIUM BROMIDE INHALATION SPRAY 3.12 UG/1
5 SPRAY, METERED RESPIRATORY (INHALATION) DAILY
Qty: 1 G | Refills: 6 | Status: SHIPPED | OUTPATIENT
Start: 2022-03-28 | End: 2022-04-13 | Stop reason: SDUPTHER

## 2022-03-28 ASSESSMENT — ENCOUNTER SYMPTOMS
SHORTNESS OF BREATH: 1
WEAKNESS: 1
GASTROINTESTINAL NEGATIVE: 1
MUSCULOSKELETAL NEGATIVE: 1
EYES NEGATIVE: 1
WHEEZING: 1
COUGH: 1
PSYCHIATRIC NEGATIVE: 1
CARDIOVASCULAR NEGATIVE: 1

## 2022-03-28 ASSESSMENT — PATIENT HEALTH QUESTIONNAIRE - PHQ9
5. POOR APPETITE OR OVEREATING: 3 - NEARLY EVERY DAY
CLINICAL INTERPRETATION OF PHQ2 SCORE: 4
SUM OF ALL RESPONSES TO PHQ QUESTIONS 1-9: 13

## 2022-03-28 NOTE — PROGRESS NOTES
"Pulmonary Consultation    Date of Service: 3/28/2022    Consulting Physician: Aleks Moreno D.O.    Reason for consult:  Establish Care (Referred by Aleks Moreno D.O for SOB/Abnormal PFT) and Other (PFT 10/13/21, CXR 07/14/21)      Problem List Items Addressed This Visit     Dyspnea on exertion     Progressing over 2 years  Dx with obstruction in 10/2021 on PFTs , mild with normal gas transfer  Unable to view full PFT (to note ratio was normal)  Also hx of diastolic dysfunction in 2013    So at this time with his 75 pk year hx of smoking he may now have developed COPD (wheezing and coughing supportive) - trail of spiriva 2 puffs qday respimat (clinic to call pt in 2 days to ensure he has started spiriva).  Would also like to reassess cardiac function with ECHO and a CXR    Weight does play a role in dyspnea but would not explain how he has progressed over the two years    Follow up in 2 weeks           Relevant Orders    DX-CHEST-2 VIEWS    EC-ECHOCARDIOGRAM COMPLETE W/O CONT            History of Present Illness: Andrei Galeas is a 76 y.o. male with a past medical history of essential hypertension, hyperlipidemia, obesity class II BMI of 39.05, KATIA on CPAPand possible prediabetes.  PFTs done 10/13/2021 at [facility] shows   \"Mild obstructive lung disease with best FEV1 of 2.63 L and FEV1 to FVC ratio 107% predicted.  There is no significant   bronchodilator response.  Lung volumes by plethysmography are within   normal limits with a TLC of 86% predicted and the DLCO uncorrected for the   hemoglobin is normal at 85% predicted\"  Last imaging in 2016 CXR no I or E    Wa sin [place] oct to April and then the rest of the time in [place]  Feels like he is SOB for the lats 2 years  Able to walk for 1.5 hrs  + allergies     Smoked from age 17 to 42 == 3 pks a day === 75 pk yr and quit 34 yrs ago    2013  \"Left Ventricle   Normal left ventricular size and function. Left ventricular ejection   fraction is 55% to 60%. Grade " "II diastolic dysfunction is present. Mild   concentric left ventricular hypertrophy. \"      Exercise tolerance:  Walking distance: less than 10 minutes  [place]: unable    Night time symptoms: uses CPAP    Pulmonary History:    Environmental exposures: no hot tub; no woodstove, no mining work, and no asbestos exposure    Pets: dog and puppy  Occupation History: retired since . 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   HENT: Negative.    Eyes: Negative.    Respiratory: Positive for cough, shortness of breath and wheezing.    Cardiovascular: Negative.    Gastrointestinal: Negative.    Genitourinary: Negative.    Musculoskeletal: Negative.    Skin: Negative.    Neurological: Positive for weakness.   Endo/Heme/Allergies: Negative.    Psychiatric/Behavioral: Negative.        Current Outpatient Medications on File Prior to Visit   Medication Sig Dispense Refill   • rosuvastatin (CRESTOR) 10 MG Tab Take 10 mg by mouth every day.     • olmesartan (BENICAR) 20 MG Tab Take 20 mg by mouth every day.     • finasteride (PROSCAR) 5 MG Tab Take 5 mg by mouth every day.     • tamsulosin (FLOMAX) 0.4 MG capsule TAKE 2 CAPSULES BY MOUTH ONCE DAILY AS DIRECTED AFTER A MEAL IN THE EVENING     • fexofenadine (ALLEGRA) 60 MG Tab Take 60 mg by mouth every day.     • multivitamin (THERAGRAN) Tab Take 1 Tab by mouth every day.     • fluticasone (FLONASE) 50 MCG/ACT nasal spray Spray 1 Spray in nose every day.     • losartan (COZAAR) 100 MG TABS Take 1 Tab by mouth every day. 90 Tab 3     No current facility-administered medications on file prior to visit.       Social History     Tobacco Use   • Smoking status: Former Smoker     Packs/day: 2.00     Years: 10.00     Pack years: 20.00     Quit date: 1977     Years since quittin.5   • Smokeless tobacco: Never " "Used   Vaping Use   • Vaping Use: Never used   Substance Use Topics   • Alcohol use: Yes     Comment: 1-2 drinks every week or two   • Drug use: No        Past Medical History:   Diagnosis Date   • Arthritis    • Back pain    • Cataract    • Depression    • Diabetes (HCC)     borderline   • Hypertension    • Shortness of breath    • Sinusitis    • Sleep apnea    • Wheezing        Past Surgical History:   Procedure Laterality Date   • SINUSOTOMIES         Allergies: Penicillins    Family History   Problem Relation Age of Onset   • Heart Disease Paternal Uncle         MI   • Arthritis Mother    • Psychiatric Illness Father         stomach cancer   • Hypertension Father    • Hyperlipidemia Father    • Stroke Father        Vitals:    03/28/22 1335   Height: 1.803 m (5' 11\")   Weight: (!) 129 kg (284 lb 6 oz)   Weight % change since last entry.: 0 %   BP: 154/66   Pulse: 66   BMI (Calculated): 39.66   O2 sat % room air: 95 %       Physical Examination  Physical Exam  Constitutional:       Appearance: He is obese.   HENT:      Head: Normocephalic and atraumatic.      Mouth/Throat:      Mouth: Mucous membranes are moist.   Eyes:      Pupils: Pupils are equal, round, and reactive to light.   Cardiovascular:      Rate and Rhythm: Normal rate.      Heart sounds: No murmur heard.  Pulmonary:      Effort: Pulmonary effort is normal.      Breath sounds: Normal breath sounds.   Abdominal:      Palpations: Abdomen is soft.   Musculoskeletal:      Cervical back: Normal range of motion.      Right lower leg: Edema present.      Left lower leg: Edema present.   Skin:     General: Skin is warm and dry.   Neurological:      General: No focal deficit present.      Mental Status: He is oriented to person, place, and time.   Psychiatric:         Mood and Affect: Mood normal.         Behavior: Behavior normal.         Thought Content: Thought content normal.         Judgment: Judgment normal.            Manuel Foy M.D., MD MPH " [facility]  [facility]

## 2022-03-29 NOTE — ASSESSMENT & PLAN NOTE
Progressing over 2 years  Dx with obstruction in 10/2021 on PFTs , mild with normal gas transfer  Unable to view full PFT (to note ratio was normal)  Also hx of diastolic dysfunction in 2013    So at this time with his 75 pk year hx of smoking he may now have developed COPD (wheezing and coughing supportive) - trail of spiriva 2 puffs qday respimat (clinic to call pt in 2 days to ensure he has started spiriva).  Would also like to reassess cardiac function with ECHO and a CXR    Weight does play a role in dyspnea but would not explain how he has progressed over the two years    Follow up in 2 weeks

## 2022-03-30 ENCOUNTER — HOSPITAL ENCOUNTER (OUTPATIENT)
Dept: RADIOLOGY | Facility: MEDICAL CENTER | Age: 77
End: 2022-03-30
Attending: INTERNAL MEDICINE
Payer: MEDICARE

## 2022-03-30 DIAGNOSIS — R06.09 DYSPNEA ON EXERTION: ICD-10-CM

## 2022-03-30 PROCEDURE — 71046 X-RAY EXAM CHEST 2 VIEWS: CPT

## 2022-04-06 ENCOUNTER — HOSPITAL ENCOUNTER (OUTPATIENT)
Dept: CARDIOLOGY | Facility: MEDICAL CENTER | Age: 77
End: 2022-04-06
Attending: INTERNAL MEDICINE
Payer: MEDICARE

## 2022-04-06 DIAGNOSIS — R06.09 DYSPNEA ON EXERTION: ICD-10-CM

## 2022-04-06 LAB
LV EJECT FRACT  99904: 60
LV EJECT FRACT MOD 2C 99903: 50.31
LV EJECT FRACT MOD 4C 99902: 62.44
LV EJECT FRACT MOD BP 99901: 54.98

## 2022-04-06 PROCEDURE — 93306 TTE W/DOPPLER COMPLETE: CPT

## 2022-04-06 PROCEDURE — 93306 TTE W/DOPPLER COMPLETE: CPT | Mod: 26 | Performed by: INTERNAL MEDICINE

## 2022-04-07 RX ORDER — TRIAMCINOLONE ACETONIDE 1 MG/G
OINTMENT TOPICAL BID
Qty: 80 | Refills: 0 | Status: ERX

## 2022-04-13 ENCOUNTER — OFFICE VISIT (OUTPATIENT)
Dept: SLEEP MEDICINE | Facility: MEDICAL CENTER | Age: 77
End: 2022-04-13
Payer: MEDICARE

## 2022-04-13 VITALS
HEIGHT: 71 IN | DIASTOLIC BLOOD PRESSURE: 66 MMHG | WEIGHT: 278.56 LBS | HEART RATE: 59 BPM | OXYGEN SATURATION: 92 % | BODY MASS INDEX: 39 KG/M2 | SYSTOLIC BLOOD PRESSURE: 144 MMHG

## 2022-04-13 DIAGNOSIS — R06.09 DYSPNEA ON EXERTION: ICD-10-CM

## 2022-04-13 PROCEDURE — 99213 OFFICE O/P EST LOW 20 MIN: CPT | Performed by: INTERNAL MEDICINE

## 2022-04-13 RX ORDER — TIOTROPIUM BROMIDE INHALATION SPRAY 3.12 UG/1
5 SPRAY, METERED RESPIRATORY (INHALATION) DAILY
Qty: 2 EACH | Refills: 0 | COMMUNITY
Start: 2022-04-13 | End: 2022-07-27

## 2022-04-13 RX ORDER — ALBUTEROL SULFATE 90 UG/1
AEROSOL, METERED RESPIRATORY (INHALATION)
COMMUNITY
Start: 2022-04-01 | End: 2022-11-18

## 2022-04-13 ASSESSMENT — ENCOUNTER SYMPTOMS
GASTROINTESTINAL NEGATIVE: 1
NEUROLOGICAL NEGATIVE: 1
CARDIOVASCULAR NEGATIVE: 1
EYES NEGATIVE: 1
PSYCHIATRIC NEGATIVE: 1
MUSCULOSKELETAL NEGATIVE: 1

## 2022-04-13 NOTE — PROGRESS NOTES
"Pulmonary Clinic follow up    Date of Service: 4/13/2022    Reason for follow up:  Follow-Up (OCHOA. Last seen  3/28/22) and Results (Echo 04/06/22, CXR 03/30/22)      Problem List Items Addressed This Visit     Dyspnea on exertion     Mild obstructive disease with normal gas transfer.  History of 75-pack-year history of smoking and likely this is COPD.  Patient's dyspnea will improve with inhalers but cost of inhalers are prohibiting.  We will give him a sample of Spiriva Respimat for 1 month and also referral to our COPD pharmacotherapy team.  We would like to get patient active and mobilizing more as weight gain also plays a role.  Follow-up in 1 month                 History of Present Illness: Andrei Galeas is a 76 y.o. male with a past medical history of  essential hypertension, hyperlipidemia, obesity class II BMI of 39.05, KATIA on CPAPand possible prediabete, 75 pk yr hx of smoking with mild COPD.  PFTs done 10/13/2021 at [facility] shows   \"Mild obstructive lung disease with best FEV1 of 2.63 L and FEV1 to FVC ratio 107% predicted.  There is no significant bronchodilator response.  Lung volumes by plethysmography are within  normal limits with a TLC of 86% predicted and the DLCO uncorrected for the   hemoglobin is normal at 85% predicted\"  I last saw patient on 3/28/2022 for his dyspnea that has been progressive over 2 years.  With his COPD wanted to trial spiriva, cxr done 3/30 which was also nomal and echo which was done 4/6 and was normal     Follow up of inhaler use.  However Spiriva was very expensive so was unable to get the Spiriva.    Review of Systems   Constitutional: Positive for malaise/fatigue.   HENT: Negative.    Eyes: Negative.    Respiratory: Positive for cough and shortness of breath.    Cardiovascular: Negative.    Gastrointestinal: Negative.    Genitourinary: Negative.    Musculoskeletal: Negative.    Skin: Negative.    Neurological: Negative.    Endo/Heme/Allergies: Negative.  " "  Psychiatric/Behavioral: Negative.        Current Outpatient Medications on File Prior to Visit   Medication Sig Dispense Refill   • VENTOLIN  (90 Base) MCG/ACT Aero Soln inhalation aerosol INHALE 1 TO 2 PUFFS BY MOUTH EVERY 4 HOURS AS NEEDED FOR SHORTNESS OF BREATH OR WHEEZING     • rosuvastatin (CRESTOR) 10 MG Tab Take 10 mg by mouth every day.     • olmesartan (BENICAR) 20 MG Tab Take 20 mg by mouth every day.     • finasteride (PROSCAR) 5 MG Tab Take 5 mg by mouth every day.     • tamsulosin (FLOMAX) 0.4 MG capsule TAKE 2 CAPSULES BY MOUTH ONCE DAILY AS DIRECTED AFTER A MEAL IN THE EVENING     • fexofenadine (ALLEGRA) 60 MG Tab Take 60 mg by mouth every day.     • multivitamin (THERAGRAN) Tab Take 1 Tab by mouth every day.     • fluticasone (FLONASE) 50 MCG/ACT nasal spray Spray 1 Spray in nose every day.       No current facility-administered medications on file prior to visit.       Social History     Tobacco Use   • Smoking status: Former Smoker     Packs/day: 2.00     Years: 10.00     Pack years: 20.00     Quit date: 1977     Years since quittin.6   • Smokeless tobacco: Never Used   Vaping Use   • Vaping Use: Never used   Substance Use Topics   • Alcohol use: Yes     Comment: 1-2 drinks every week or two   • Drug use: No        Past Medical History:   Diagnosis Date   • Arthritis    • Back pain    • Cataract    • Depression    • Diabetes (HCC)     borderline   • Hypertension    • Shortness of breath    • Sinusitis    • Sleep apnea    • Wheezing        Past Surgical History:   Procedure Laterality Date   • SINUSOTOMIES         Allergies: Penicillins    Family History   Problem Relation Age of Onset   • Heart Disease Paternal Uncle         MI   • Arthritis Mother    • Psychiatric Illness Father         stomach cancer   • Hypertension Father    • Hyperlipidemia Father    • Stroke Father        Vitals:    22 1200   Height: 1.803 m (5' 11\")   Weight: (!) 126 kg (278 lb 9 oz)   Weight % " change since last entry.: 0 %   BP: 144/66   Pulse: (!) 59   BMI (Calculated): 38.85   O2 sat % room air: 92 %       Physical Examination  Physical Exam  Constitutional:       Appearance: He is obese.   HENT:      Head: Normocephalic and atraumatic.      Mouth/Throat:      Mouth: Mucous membranes are dry.   Eyes:      Extraocular Movements: Extraocular movements intact.      Pupils: Pupils are equal, round, and reactive to light.   Cardiovascular:      Rate and Rhythm: Normal rate.   Pulmonary:      Breath sounds: Wheezing present.   Musculoskeletal:      Cervical back: Normal range of motion.      Right lower leg: Edema present.      Left lower leg: Edema present.   Skin:     General: Skin is warm and dry.   Neurological:      General: No focal deficit present.      Mental Status: He is oriented to person, place, and time.   Psychiatric:         Mood and Affect: Mood normal.         Behavior: Behavior normal.         Thought Content: Thought content normal.         Judgment: Judgment normal.             Manuel Foy M.D., MD MPH [facility]  [facility]

## 2022-04-15 ENCOUNTER — OFFICE VISIT (OUTPATIENT)
Dept: MEDICAL GROUP | Facility: LAB | Age: 77
End: 2022-04-15
Payer: MEDICARE

## 2022-04-15 VITALS
RESPIRATION RATE: 16 BRPM | BODY MASS INDEX: 39.56 KG/M2 | DIASTOLIC BLOOD PRESSURE: 60 MMHG | HEIGHT: 71 IN | SYSTOLIC BLOOD PRESSURE: 140 MMHG | OXYGEN SATURATION: 94 % | TEMPERATURE: 97.9 F | WEIGHT: 282.6 LBS | HEART RATE: 60 BPM

## 2022-04-15 DIAGNOSIS — G56.92 NEUROPATHY OF LEFT HAND: ICD-10-CM

## 2022-04-15 DIAGNOSIS — Z11.59 NEED FOR HEPATITIS C SCREENING TEST: ICD-10-CM

## 2022-04-15 DIAGNOSIS — Z76.89 ENCOUNTER TO ESTABLISH CARE: ICD-10-CM

## 2022-04-15 DIAGNOSIS — R73.03 PREDIABETES: ICD-10-CM

## 2022-04-15 DIAGNOSIS — Z13.6 SCREENING FOR CARDIOVASCULAR CONDITION: ICD-10-CM

## 2022-04-15 DIAGNOSIS — R06.09 DYSPNEA ON EXERTION: ICD-10-CM

## 2022-04-15 DIAGNOSIS — Z12.5 ENCOUNTER FOR SCREENING FOR MALIGNANT NEOPLASM OF PROSTATE: ICD-10-CM

## 2022-04-15 DIAGNOSIS — G56.91 NEUROPATHY OF RIGHT HAND: ICD-10-CM

## 2022-04-15 PROCEDURE — 99203 OFFICE O/P NEW LOW 30 MIN: CPT | Performed by: FAMILY MEDICINE

## 2022-04-15 ASSESSMENT — ENCOUNTER SYMPTOMS
VOMITING: 0
DEPRESSION: 0
NERVOUS/ANXIOUS: 0
DIARRHEA: 0
WHEEZING: 0
SHORTNESS OF BREATH: 0
ABDOMINAL PAIN: 0
PALPITATIONS: 0
FEVER: 0
CHILLS: 0
NAUSEA: 0

## 2022-04-15 NOTE — PROGRESS NOTES
Andrei Galeas is a 76 y.o. male here for No chief complaint on file.      HPI:  Andrei is a very pleasant 76 y.o. male.     #Estbalish care   -Reviewed all past medical history, family history, social history.  -Reviewed all screening/vaccinations: Up-to-date on all vaccinations.  Screenings needed including PSA, hepatitis C screening, lipid profile.  -Diet and Exercise: Working on improving diet and exercise regimen.  -Tobacco, alcohol, recreational drug use: See chart  -Sexually active: See chart    #COPD:  -patient has a longstanding history of sleep apnea, following up with sleep medicine.  The other day at during pulmonology appointment he was complaining of dyspnea on exertion.  Echocardiogram, chest x-ray was completed with no concerning findings.  PFTs were completed in October showing mild obstructive lung disease.  Patient was placed on Spiriva.  Dates he is started using it a day ago.  So far has not noticed any improvement but will continue to use.  Denies any side effects.    #Hand numbness   -Patient states that he has been having numbness in hands for 1 year. Started with right hand, now in both. No pain. Still has strength, just no feeling in hands.  He has noticed decreased range of motion in his right hand.  He states he is no longer able to touch his thumb to his pinky.  Denies any previous trauma to hands.  Today for further evaluation.    #BPH:-Chronic longstanding history currently treating with Secor 0.8 mg daily, finasteride.  States that he is doing well.  Continues to have occasional nighttime awakening but overall feels like symptoms are well controlled with no concerns.    #Hypertension:  -Currently treating with olmesartan.  Compliant medication denies any side effects.  Work on improving diet and exercise regimen.    #Hyperlipidemia:-Currently on Crestor 10 mg daily.  Compliant with medication, denies any side effects.    #Prediabetes:  -Patient states that previous labs show  "that he has had \"borderline\" diabetes.  Has never started any medications.  Denies any numbness, tingling, pain in lower extremities, polydipsia, polyuria.    #KATIA:-Currently on CPAP, compliant.  Is being followed by pulmonology.  Current medicines (including changes today)  Current Outpatient Medications   Medication Sig Dispense Refill   • tiotropium (SPIRIVA RESPIMAT) 2.5 mcg/Act Aero Soln Inhale 2 Inhalation every day. Assemble and prime. 2 Each 0   • rosuvastatin (CRESTOR) 10 MG Tab Take 10 mg by mouth every day.     • olmesartan (BENICAR) 20 MG Tab Take 20 mg by mouth every day.     • finasteride (PROSCAR) 5 MG Tab Take 5 mg by mouth every day.     • tamsulosin (FLOMAX) 0.4 MG capsule TAKE 2 CAPSULES BY MOUTH ONCE DAILY AS DIRECTED AFTER A MEAL IN THE EVENING     • fexofenadine (ALLEGRA) 60 MG Tab Take 60 mg by mouth every day.     • fluticasone (FLONASE) 50 MCG/ACT nasal spray Spray 1 Spray in nose every day.     • VENTOLIN  (90 Base) MCG/ACT Aero Soln inhalation aerosol INHALE 1 TO 2 PUFFS BY MOUTH EVERY 4 HOURS AS NEEDED FOR SHORTNESS OF BREATH OR WHEEZING     • multivitamin (THERAGRAN) Tab Take 1 Tab by mouth every day.     • losartan (COZAAR) 100 MG TABS Take 1 Tab by mouth every day. (Patient not taking: Reported on 4/15/2022) 90 Tab 3     No current facility-administered medications for this visit.     He  has a past medical history of Arthritis, Back pain, Cataract, Depression, Diabetes ([facility]), Hypertension, Shortness of breath, Sinusitis, Sleep apnea, and Wheezing.    He has no past medical history of Anginal syndrome ([facility]), Arrhythmia, Asthma, Blood clotting disorder ([facility]), Bronchitis, CAD (coronary artery disease), Cancer ([facility]), Congestive heart failure ([facility]), COPD (chronic obstructive pulmonary disease) ([facility]), Cough, Dialysis patient ([facility]), Dilated cardiomyopathy ([facility]), Disorder of thyroid, Fall, Glaucoma, Gynecological disorder, Heart murmur, Heart valve disease, Hemorrhagic disorder ([facility]), " "History of - myocardial infarction, Indigestion, Infectious disease, Jaundice, Myocardial infarct (HCC), Pacemaker, Painful breathing, Pneumonia, Renal disorder, Rheumatic fever, Seizure (HCC), Sputum production, Stroke (HCC), or Urinary incontinence.  He  has a past surgical history that includes sinusotomies.  Social History     Tobacco Use   • Smoking status: Former Smoker     Packs/day: 2.00     Years: 10.00     Pack years: 20.00     Quit date: 1977     Years since quittin.6   • Smokeless tobacco: Never Used   Vaping Use   • Vaping Use: Never used   Substance Use Topics   • Alcohol use: Yes     Comment: 1-2 drinks every week or two   • Drug use: No     Social History     Social History Narrative   • Not on file     Family History   Problem Relation Age of Onset   • Heart Disease Paternal Uncle         MI   • Arthritis Mother    • Psychiatric Illness Father         stomach cancer   • Hypertension Father    • Hyperlipidemia Father    • Stroke Father      Family Status   Relation Name Status   • [facility]     • Sis  Alive   • Mo  (Not Specified)   • Fa  (Not Specified)     ROS  Review of Systems   Constitutional: Negative for chills and fever.   Respiratory: Negative for shortness of breath and wheezing.    Cardiovascular: Negative for chest pain and palpitations.   Gastrointestinal: Negative for abdominal pain, diarrhea, nausea and vomiting.   Psychiatric/Behavioral: Negative for depression. The patient is not nervous/anxious.         Objective:     /60   Pulse 60   Temp 36.6 °C (97.9 °F)   Resp 16   Ht 1.803 m (5' 11\")   Wt (!) 128 kg (282 lb 9.6 oz)   SpO2 94%  Body mass index is 39.41 kg/m².  Physical Exam:    Constitutional: Alert, no distress.  Skin: Warm, dry, good turgor, no rashes in visible areas.  Eye: Equal, round and reactive, conjunctiva clear, lids normal.  ENMT: Lips without lesions, good dentition, oropharynx clear. TM's pearly gray with normal light reflexes " bilaterally  Neck: Trachea midline, no masses, no thyromegaly. No cervical or supraclavicular lymphadenopathy.  Respiratory: Unlabored respiratory effort, lungs clear to auscultation bilaterally, no wheezes, rales, or ronchi.  Cardiovascular: Normal S1, S2, RRR, no murmur, no edema.  Abdomen: Soft, non-tender, no masses, no hepatosplenomegaly.  Psych: Alert and oriented x3, normal affect and mood.    Const: Vitals above. Well-appearing.  CV: Inspected/palpated for upper extremity edema. Bilateral radial pulses palpated, noting below if not 2+. Capillary refill evaluated distally, noting below if greater than 2 seconds.  Skin: Inspected for rash or lesions in area of concern.  Neuro/psych: Reflexes at brachioradialis (C5/6), biceps (C5/6), triceps (C7/8): 2+. 2-point discrimination assessed at 2nd finger (C6, median nerve), 3rd finger (C7, median nerve), 5th finger (C8, ulnar nerve), and dorsal web space between 1st/2nd digit (radial nerve). Phalen, Tinel's tests: Negative. Observed for normal mood/affect/insight.  MSK: normal gait. Posture: Unremarkable. Examination of bilateral wrist/hand:  - Insepected/palpated bilaterally for warmth, upper extremity carriage, ulnar variance, and for deformity and tenderness of the proximal/distal radius and ulna, scaphoid/snuffbox, carpals, metacarpals, phalanges, carpal/CMC/MCP/IP joints, and TFCC.  - Assessed passive/active range of motion bilaterally with forearm pronation/supination, wrist flexion/extension, wrist radial/ulnar deviation, MCP flexion/extension/abduction/adduction, IP joint flexion/extension, and thumb flexion/extension/abduction/adduction/opposition, noting below for any pain or crepitation.  - Assessed muscle strength bilaterally with wrist flexion (C6/7, median/ulnar nerves), wrist extension (C7/8 radial nerve), finger extension (C7, radial nerve), finger abduction (T1, ulnar nerve), and thumb opposition (median nerve), noting below if less than 5/5 or pain.  Observed for muscle atrophy in thenar, hypothenar, and interosseus areas.  - Assessed stability bilaterally at the TFCC (piano key test) and wrist, carpal, scapho-lunate (Shuck test), metacarpal, and phalangeal joints. Varus/valgus stress at MCP/IP joints: Unremarkable. Finkelstein test negative.    All of the above were found to be normal, except:  -Decrease sensation with monofilament on thumb, index finger of of right hand.  Slight decrease in  strength of right hand, otherwise normal physical exam.      Assessment and Plan:   The following treatment plan was discussed    1. Encounter to establish care  -All questions concerns were answered at this time.  -Vaccinations/screenings needed at this time: Patient is due for labs including PSA, lipid screening.  -Labs reviewed, no concerns, check labs as below.  -Discussed the importance of a healthy, Mediterranean style diet, routine exercise regimen.  -Discussed general safety measures including seatbelts, helmets, avoidance of smoking, sunscreen, hydration.  -Follow-up for general physical exam on a yearly basis, sooner if needed.  - Comp Metabolic Panel; Future    2. Neuropathy of left hand  -Uncertain etiology of neuropathy in left and right hands.  It does not appear to be any signs of carpal tunnel, de Quervain's.  I do think further evaluation is needed and will order EMG of left and right hand.  Patient will follow-up for evaluation and interpretation of results.  - Referral to Neurodiagnostics (EEG,EP,EMG/NCS/DBS)    3. Neuropathy of right hand  -See #2.  - Referral to Neurodiagnostics (EEG,EP,EMG/NCS/DBS)    4. Screening for cardiovascular condition  - Lipid Profile; Future    5. Need for hepatitis C screening test  - HEP C VIRUS ANTIBODY; Future    6. Encounter for screening for malignant neoplasm of prostate  - PROSTATE SPECIFIC AG SCREENING; Future    7. Prediabetes  - HEMOGLOBIN A1C; Future    8. Dyspnea on exertion  -Continue on Spiriva, Ventolin as  needed.  Follow-up with [facility] as needed.      Records requested.  Followup: No follow-ups on file.         This note was created using voice recognition software. I have made every reasonable attempt to correct errors, however, I do anticipate some grammatical errors.

## 2022-04-15 NOTE — LETTER
[facility]  Leo Villegas M.D.  [address] [facility] [address]  [place] [address]  Fax: [phone]   Authorization for Release/Disclosure of   Protected Health Information   Name: MARTA GALLEGOS : 1945 SSN: [id number]   Address: [address] [place] [address]  [place] [address] Phone:    [phone] (home)    I authorize the entity listed below to release/disclose the PHI below to:   [facility]/Leo Villegas M.D. and Leo Villegas M.D.   Provider or Entity Name: [facility]    Address   City, [facility], [facility], [place]   Phone: [phone]    [phone]     Reason for request: continuity of care   Information to be released:    [ XXX ] LAST COLONOSCOPY,  including any PATH REPORT and follow-up  [  ] LAST FIT/COLOGUARD RESULT [  ] LAST DEXA  [  ] LAST MAMMOGRAM  [  ] LAST PAP  [  ] LAST LABS [  ] RETINA EXAM REPORT  [  ] IMMUNIZATION RECORDS  [  ] Release all info      [  ] Check here and initial the line next to each item to release ALL health information INCLUDING  _____ Care and treatment for drug and / or alcohol abuse  _____ HIV testing, infection status, or AIDS  _____ Genetic Testing    DATES OF SERVICE OR TIME PERIOD TO BE DISCLOSED: _____________  I understand and acknowledge that:  * This Authorization may be revoked at any time by you in writing, except if your health information has already been used or disclosed.  * Your health information that will be used or disclosed as a result of you signing this authorization could be re-disclosed by the recipient. If this occurs, your re-disclosed health information may no longer be protected by State or Federal laws.  * You may refuse to sign this Authorization. Your refusal will not affect your ability to obtain treatment.  * This Authorization becomes effective upon signing and will  on (date) __________.      If no date is indicated, this Authorization will  one (1) year from the signature date.    Name:  Andrei Galeas    Signature: PCP requesting records, Continuity of care    Date:     4/15/2022       PLEASE FAX REQUESTED RECORDS BACK TO: [phone]

## 2022-04-17 ASSESSMENT — ENCOUNTER SYMPTOMS
SHORTNESS OF BREATH: 1
COUGH: 1

## 2022-04-18 ENCOUNTER — HOSPITAL ENCOUNTER (OUTPATIENT)
Dept: LAB | Facility: MEDICAL CENTER | Age: 77
End: 2022-04-18
Attending: FAMILY MEDICINE
Payer: MEDICARE

## 2022-04-18 DIAGNOSIS — Z11.59 NEED FOR HEPATITIS C SCREENING TEST: ICD-10-CM

## 2022-04-18 DIAGNOSIS — R73.03 PREDIABETES: ICD-10-CM

## 2022-04-18 DIAGNOSIS — Z76.89 ENCOUNTER TO ESTABLISH CARE: ICD-10-CM

## 2022-04-18 DIAGNOSIS — Z13.6 SCREENING FOR CARDIOVASCULAR CONDITION: ICD-10-CM

## 2022-04-18 DIAGNOSIS — Z12.5 ENCOUNTER FOR SCREENING FOR MALIGNANT NEOPLASM OF PROSTATE: ICD-10-CM

## 2022-04-18 LAB
ALBUMIN SERPL BCP-MCNC: 4.7 G/DL (ref 3.2–4.9)
ALBUMIN/GLOB SERPL: 2.1 G/DL
ALP SERPL-CCNC: 45 U/L (ref 30–99)
ALT SERPL-CCNC: 59 U/L (ref 2–50)
ANION GAP SERPL CALC-SCNC: 10 MMOL/L (ref 7–16)
AST SERPL-CCNC: 36 U/L (ref 12–45)
BILIRUB SERPL-MCNC: 0.6 MG/DL (ref 0.1–1.5)
BUN SERPL-MCNC: 16 MG/DL (ref 8–22)
CALCIUM SERPL-MCNC: 9.3 MG/DL (ref 8.5–10.5)
CHLORIDE SERPL-SCNC: 104 MMOL/L (ref 96–112)
CHOLEST SERPL-MCNC: 109 MG/DL (ref 100–199)
CO2 SERPL-SCNC: 23 MMOL/L (ref 20–33)
CREAT SERPL-MCNC: 0.87 MG/DL (ref 0.5–1.4)
EST. AVERAGE GLUCOSE BLD GHB EST-MCNC: 126 MG/DL
FASTING STATUS PATIENT QL REPORTED: NORMAL
GFR SERPLBLD CREATININE-BSD FMLA CKD-EPI: 90 ML/MIN/1.73 M 2
GLOBULIN SER CALC-MCNC: 2.2 G/DL (ref 1.9–3.5)
GLUCOSE SERPL-MCNC: 112 MG/DL (ref 65–99)
HBA1C MFR BLD: 6 % (ref 4–5.6)
HCV AB SER QL: NORMAL
HDLC SERPL-MCNC: 34 MG/DL
LDLC SERPL CALC-MCNC: 41 MG/DL
POTASSIUM SERPL-SCNC: 4.4 MMOL/L (ref 3.6–5.5)
PROT SERPL-MCNC: 6.9 G/DL (ref 6–8.2)
PSA SERPL-MCNC: 0.47 NG/ML (ref 0–4)
SODIUM SERPL-SCNC: 137 MMOL/L (ref 135–145)
TRIGL SERPL-MCNC: 171 MG/DL (ref 0–149)

## 2022-04-18 PROCEDURE — 36415 COLL VENOUS BLD VENIPUNCTURE: CPT

## 2022-04-18 PROCEDURE — 84153 ASSAY OF PSA TOTAL: CPT

## 2022-04-18 PROCEDURE — 80061 LIPID PANEL: CPT

## 2022-04-18 PROCEDURE — 80053 COMPREHEN METABOLIC PANEL: CPT

## 2022-04-18 PROCEDURE — 86803 HEPATITIS C AB TEST: CPT

## 2022-04-18 PROCEDURE — 83036 HEMOGLOBIN GLYCOSYLATED A1C: CPT

## 2022-04-18 NOTE — ASSESSMENT & PLAN NOTE
Mild obstructive disease with normal gas transfer.  History of 75-pack-year history of smoking and likely this is COPD.  Patient's dyspnea will improve with inhalers but cost of inhalers are prohibiting.  We will give him a sample of Spiriva Respimat for 1 month and also referral to our COPD pharmacotherapy team.  We would like to get patient active and mobilizing more as weight gain also plays a role.  Follow-up in 1 month

## 2022-04-20 ENCOUNTER — APPOINTMENT (RX ONLY)
Dept: URBAN - METROPOLITAN AREA CLINIC 6 | Facility: CLINIC | Age: 77
Setting detail: DERMATOLOGY
End: 2022-04-20

## 2022-04-20 DIAGNOSIS — L73.1 PSEUDOFOLLICULITIS BARBAE: ICD-10-CM

## 2022-04-20 DIAGNOSIS — L56.5 DISSEMINATED SUPERFICIAL ACTINIC POROKERATOSIS (DSAP): ICD-10-CM

## 2022-04-20 DIAGNOSIS — R20.2 PARESTHESIA OF SKIN: ICD-10-CM | Status: RESOLVED

## 2022-04-20 DIAGNOSIS — L85.3 XEROSIS CUTIS: ICD-10-CM

## 2022-04-20 PROCEDURE — 99213 OFFICE O/P EST LOW 20 MIN: CPT

## 2022-04-20 PROCEDURE — ? COUNSELING

## 2022-04-20 PROCEDURE — ? PRESCRIPTION MEDICATION MANAGEMENT

## 2022-04-20 PROCEDURE — ? PRESCRIPTION

## 2022-04-20 RX ORDER — FLUOCINONIDE 0.5 MG/ML
SOLUTION TOPICAL
Qty: 60 | Refills: 2 | Status: ERX | COMMUNITY
Start: 2022-04-20

## 2022-04-20 RX ORDER — TRIAMCINOLONE ACETONIDE 1 MG/G
OINTMENT TOPICAL BID
Qty: 80 | Refills: 3 | Status: ERX

## 2022-04-20 RX ADMIN — FLUOCINONIDE: 0.5 SOLUTION TOPICAL at 00:00

## 2022-04-20 ASSESSMENT — LOCATION SIMPLE DESCRIPTION DERM
LOCATION SIMPLE: CHEST
LOCATION SIMPLE: RIGHT FOREARM
LOCATION SIMPLE: POSTERIOR NECK
LOCATION SIMPLE: LEFT FOREARM

## 2022-04-20 ASSESSMENT — LOCATION DETAILED DESCRIPTION DERM
LOCATION DETAILED: RIGHT PROXIMAL DORSAL FOREARM
LOCATION DETAILED: MID POSTERIOR NECK
LOCATION DETAILED: LEFT MEDIAL SUPERIOR CHEST
LOCATION DETAILED: RIGHT MEDIAL TRAPEZIAL NECK
LOCATION DETAILED: LEFT PROXIMAL DORSAL FOREARM

## 2022-04-20 ASSESSMENT — LOCATION ZONE DERM
LOCATION ZONE: ARM
LOCATION ZONE: TRUNK
LOCATION ZONE: NECK

## 2022-04-20 NOTE — PROCEDURE: PRESCRIPTION MEDICATION MANAGEMENT
Initiate Treatment: Fluocinonide 0.05% solution BID PRN
Detail Level: Zone
Render In Strict Bullet Format?: No
Continue Regimen: Triamcinolone 0.1% ointment BID as needed

## 2022-04-20 NOTE — PROCEDURE: MIPS QUALITY
Detail Level: Detailed
Quality 111:Pneumonia Vaccination Status For Older Adults: Pneumococcal Vaccination Previously Received
Quality 226: Preventive Care And Screening: Tobacco Use: Screening And Cessation Intervention: Patient screened for tobacco use and is an ex/non-smoker
Quality 130: Documentation Of Current Medications In The Medical Record: Current Medications Documented
done

## 2022-07-05 ENCOUNTER — TELEPHONE (OUTPATIENT)
Dept: MEDICAL GROUP | Facility: LAB | Age: 77
End: 2022-07-05
Payer: MEDICARE

## 2022-07-05 NOTE — TELEPHONE ENCOUNTER
1. Caller Name: Kehinde                        Call Back Number: [phone] (home)        How would the patient prefer to be contacted with a response: Phone call OK to leave a detailed message    Pt started coughing with mild congestion and tested negative for COVID on Sunday.  This morning he tested positive.  The cough is what is really bothering him.  He has a history of breathing issues.  Please advise.

## 2022-07-13 ENCOUNTER — APPOINTMENT (OUTPATIENT)
Dept: SLEEP MEDICINE | Facility: MEDICAL CENTER | Age: 77
End: 2022-07-13
Payer: MEDICARE

## 2022-07-27 ENCOUNTER — OFFICE VISIT (OUTPATIENT)
Dept: SLEEP MEDICINE | Facility: MEDICAL CENTER | Age: 77
End: 2022-07-27
Payer: MEDICARE

## 2022-07-27 VITALS
HEIGHT: 71 IN | BODY MASS INDEX: 39.2 KG/M2 | OXYGEN SATURATION: 94 % | DIASTOLIC BLOOD PRESSURE: 84 MMHG | RESPIRATION RATE: 16 BRPM | SYSTOLIC BLOOD PRESSURE: 182 MMHG | WEIGHT: 280 LBS | HEART RATE: 64 BPM

## 2022-07-27 DIAGNOSIS — G47.33 OSA ON CPAP: ICD-10-CM

## 2022-07-27 DIAGNOSIS — I10 ESSENTIAL HYPERTENSION: ICD-10-CM

## 2022-07-27 DIAGNOSIS — J44.9 CHRONIC OBSTRUCTIVE PULMONARY DISEASE, UNSPECIFIED COPD TYPE (HCC): ICD-10-CM

## 2022-07-27 PROCEDURE — 99213 OFFICE O/P EST LOW 20 MIN: CPT | Performed by: PHYSICIAN ASSISTANT

## 2022-07-27 ASSESSMENT — ENCOUNTER SYMPTOMS
ROS GI COMMENTS: NO DENTURES, NO DIFFICULTY SWALLOWING
ORTHOPNEA: 0
COUGH: 1
PALPITATIONS: 0
SORE THROAT: 0
SINUS PAIN: 0
HEADACHES: 0
SHORTNESS OF BREATH: 1
INSOMNIA: 1
DIZZINESS: 0
WEIGHT LOSS: 0
SPUTUM PRODUCTION: 1
HEARTBURN: 0
WHEEZING: 1
CHILLS: 0
TREMORS: 0
FEVER: 0

## 2022-07-27 NOTE — PATIENT INSTRUCTIONS
1-reviewed lung function update at next visit  2-reviewed albuterol indications   -increased work of breathing   -shortness of breath   -Wheezing   3-not taking maintenance inhalers   4-needs replacement CPAP due to age, data not recording  5-follow up in 6 months, with PFT

## 2022-08-04 SDOH — ECONOMIC STABILITY: HOUSING INSECURITY
IN THE LAST 12 MONTHS, WAS THERE A TIME WHEN YOU DID NOT HAVE A STEADY PLACE TO SLEEP OR SLEPT IN A SHELTER (INCLUDING NOW)?: NO

## 2022-08-04 SDOH — ECONOMIC STABILITY: FOOD INSECURITY: WITHIN THE PAST 12 MONTHS, THE FOOD YOU BOUGHT JUST DIDN'T LAST AND YOU DIDN'T HAVE MONEY TO GET MORE.: NEVER TRUE

## 2022-08-04 SDOH — ECONOMIC STABILITY: TRANSPORTATION INSECURITY
IN THE PAST 12 MONTHS, HAS LACK OF RELIABLE TRANSPORTATION KEPT YOU FROM MEDICAL APPOINTMENTS, MEETINGS, WORK OR FROM GETTING THINGS NEEDED FOR DAILY LIVING?: NO

## 2022-08-04 SDOH — HEALTH STABILITY: PHYSICAL HEALTH: ON AVERAGE, HOW MANY MINUTES DO YOU ENGAGE IN EXERCISE AT THIS LEVEL?: 0 MIN

## 2022-08-04 SDOH — HEALTH STABILITY: MENTAL HEALTH
STRESS IS WHEN SOMEONE FEELS TENSE, NERVOUS, ANXIOUS, OR CAN'T SLEEP AT NIGHT BECAUSE THEIR MIND IS TROUBLED. HOW STRESSED ARE YOU?: TO SOME EXTENT

## 2022-08-04 SDOH — HEALTH STABILITY: PHYSICAL HEALTH: ON AVERAGE, HOW MANY DAYS PER WEEK DO YOU ENGAGE IN MODERATE TO STRENUOUS EXERCISE (LIKE A BRISK WALK)?: 0 DAYS

## 2022-08-04 SDOH — ECONOMIC STABILITY: TRANSPORTATION INSECURITY
IN THE PAST 12 MONTHS, HAS THE LACK OF TRANSPORTATION KEPT YOU FROM MEDICAL APPOINTMENTS OR FROM GETTING MEDICATIONS?: NO

## 2022-08-04 SDOH — ECONOMIC STABILITY: FOOD INSECURITY: WITHIN THE PAST 12 MONTHS, YOU WORRIED THAT YOUR FOOD WOULD RUN OUT BEFORE YOU GOT MONEY TO BUY MORE.: NEVER TRUE

## 2022-08-04 SDOH — ECONOMIC STABILITY: TRANSPORTATION INSECURITY
IN THE PAST 12 MONTHS, HAS LACK OF TRANSPORTATION KEPT YOU FROM MEETINGS, WORK, OR FROM GETTING THINGS NEEDED FOR DAILY LIVING?: NO

## 2022-08-04 SDOH — ECONOMIC STABILITY: HOUSING INSECURITY: IN THE LAST 12 MONTHS, HOW MANY PLACES HAVE YOU LIVED?: 1

## 2022-08-04 SDOH — ECONOMIC STABILITY: INCOME INSECURITY: IN THE LAST 12 MONTHS, WAS THERE A TIME WHEN YOU WERE NOT ABLE TO PAY THE MORTGAGE OR RENT ON TIME?: NO

## 2022-08-04 ASSESSMENT — SOCIAL DETERMINANTS OF HEALTH (SDOH)
HOW OFTEN DO YOU GET TOGETHER WITH FRIENDS OR RELATIVES?: ONCE A WEEK
WITHIN THE PAST 12 MONTHS, YOU WORRIED THAT YOUR FOOD WOULD RUN OUT BEFORE YOU GOT THE MONEY TO BUY MORE: NEVER TRUE
HOW MANY DRINKS CONTAINING ALCOHOL DO YOU HAVE ON A TYPICAL DAY WHEN YOU ARE DRINKING: PATIENT DOES NOT DRINK
HOW OFTEN DO YOU HAVE SIX OR MORE DRINKS ON ONE OCCASION: NEVER
HOW OFTEN DO YOU ATTENT MEETINGS OF THE CLUB OR ORGANIZATION YOU BELONG TO?: MORE THAN 4 TIMES PER YEAR
HOW OFTEN DO YOU ATTENT MEETINGS OF THE CLUB OR ORGANIZATION YOU BELONG TO?: MORE THAN 4 TIMES PER YEAR
HOW OFTEN DO YOU ATTEND CHURCH OR RELIGIOUS SERVICES?: MORE THAN 4 TIMES PER YEAR
DO YOU BELONG TO ANY CLUBS OR ORGANIZATIONS SUCH AS CHURCH GROUPS UNIONS, FRATERNAL OR ATHLETIC GROUPS, OR SCHOOL GROUPS?: YES
IN A TYPICAL WEEK, HOW MANY TIMES DO YOU TALK ON THE PHONE WITH FAMILY, FRIENDS, OR NEIGHBORS?: THREE TIMES A WEEK
DO YOU BELONG TO ANY CLUBS OR ORGANIZATIONS SUCH AS CHURCH GROUPS UNIONS, FRATERNAL OR ATHLETIC GROUPS, OR SCHOOL GROUPS?: YES
IN A TYPICAL WEEK, HOW MANY TIMES DO YOU TALK ON THE PHONE WITH FAMILY, FRIENDS, OR NEIGHBORS?: THREE TIMES A WEEK
HOW OFTEN DO YOU ATTEND CHURCH OR RELIGIOUS SERVICES?: MORE THAN 4 TIMES PER YEAR
HOW OFTEN DO YOU HAVE A DRINK CONTAINING ALCOHOL: NEVER
HOW OFTEN DO YOU GET TOGETHER WITH FRIENDS OR RELATIVES?: ONCE A WEEK

## 2022-08-04 ASSESSMENT — LIFESTYLE VARIABLES
SKIP TO QUESTIONS 9-10: 1
HOW MANY STANDARD DRINKS CONTAINING ALCOHOL DO YOU HAVE ON A TYPICAL DAY: PATIENT DOES NOT DRINK
AUDIT-C TOTAL SCORE: 0
HOW OFTEN DO YOU HAVE A DRINK CONTAINING ALCOHOL: NEVER
HOW OFTEN DO YOU HAVE SIX OR MORE DRINKS ON ONE OCCASION: NEVER

## 2022-08-05 ENCOUNTER — OFFICE VISIT (OUTPATIENT)
Dept: MEDICAL GROUP | Facility: LAB | Age: 77
End: 2022-08-05

## 2022-08-05 ENCOUNTER — HOSPITAL ENCOUNTER (OUTPATIENT)
Dept: LAB | Facility: MEDICAL CENTER | Age: 77
End: 2022-08-05
Attending: FAMILY MEDICINE
Payer: MEDICARE

## 2022-08-05 VITALS
RESPIRATION RATE: 14 BRPM | HEIGHT: 71 IN | WEIGHT: 283 LBS | BODY MASS INDEX: 39.62 KG/M2 | SYSTOLIC BLOOD PRESSURE: 138 MMHG | HEART RATE: 71 BPM | DIASTOLIC BLOOD PRESSURE: 72 MMHG | TEMPERATURE: 97.7 F | OXYGEN SATURATION: 95 %

## 2022-08-05 DIAGNOSIS — R73.09 ELEVATED HEMOGLOBIN A1C: ICD-10-CM

## 2022-08-05 DIAGNOSIS — I10 PRIMARY HYPERTENSION: ICD-10-CM

## 2022-08-05 DIAGNOSIS — J44.89 OBSTRUCTIVE CHRONIC BRONCHITIS WITHOUT EXACERBATION (HCC): ICD-10-CM

## 2022-08-05 LAB
EST. AVERAGE GLUCOSE BLD GHB EST-MCNC: 131 MG/DL
HBA1C MFR BLD: 6.2 % (ref 4–5.6)

## 2022-08-05 PROCEDURE — 99214 OFFICE O/P EST MOD 30 MIN: CPT | Performed by: FAMILY MEDICINE

## 2022-08-05 PROCEDURE — 36415 COLL VENOUS BLD VENIPUNCTURE: CPT

## 2022-08-05 PROCEDURE — 83036 HEMOGLOBIN GLYCOSYLATED A1C: CPT

## 2022-08-05 RX ORDER — CLINDAMYCIN PHOSPHATE 10 UG/ML
LOTION TOPICAL
COMMUNITY
End: 2023-01-25

## 2022-08-05 RX ORDER — TRIAMCINOLONE ACETONIDE 1 MG/G
OINTMENT TOPICAL
COMMUNITY
Start: 2022-07-07 | End: 2023-01-25

## 2022-08-05 RX ORDER — ROSUVASTATIN CALCIUM 10 MG/1
TABLET, COATED ORAL
COMMUNITY
End: 2022-08-05

## 2022-08-05 RX ORDER — OLMESARTAN MEDOXOMIL 20 MG/1
TABLET ORAL
COMMUNITY
End: 2022-08-05

## 2022-08-05 RX ORDER — FEXOFENADINE HCL 180 MG/1
TABLET ORAL
COMMUNITY
End: 2022-08-05

## 2022-08-05 RX ORDER — FINASTERIDE 5 MG/1
TABLET, FILM COATED ORAL
COMMUNITY
End: 2022-08-05

## 2022-08-05 RX ORDER — KETOCONAZOLE 20 MG/G
CREAM TOPICAL
COMMUNITY

## 2022-08-05 RX ORDER — ALBUTEROL SULFATE 90 UG/1
AEROSOL, METERED RESPIRATORY (INHALATION)
COMMUNITY
End: 2022-08-05

## 2022-08-05 RX ORDER — TAMSULOSIN HYDROCHLORIDE 0.4 MG/1
CAPSULE ORAL
COMMUNITY
End: 2022-08-05

## 2022-08-05 ASSESSMENT — ENCOUNTER SYMPTOMS
NAUSEA: 0
DEPRESSION: 0
WHEEZING: 0
PALPITATIONS: 0
CHILLS: 0
FEVER: 0
CONSTIPATION: 0
ABDOMINAL PAIN: 0
SHORTNESS OF BREATH: 0
DIARRHEA: 0
VOMITING: 0
NERVOUS/ANXIOUS: 0

## 2022-08-05 NOTE — PROGRESS NOTES
Subjective:   Andrei Galeas is a 76 y.o. male here today for   Chief Complaint   Patient presents with   • Hypertension Follow-up     #COPD:  -Patient here for follow-up after being placed on Spiriva by a pulmonologist.  He states that he continues to have significant issues with her size intolerance, shortness of breath, difficulty breathing (specially at night) despite the use of Spiriva.  He felt that Spiriva did not help much.  He has been using the rescue inhaler, 1 puff but feels like this is not as helpful either.  Denies any fevers, chills, cough, wheezing.    #Hypertension:  -Chronic longstanding condition currently treating with olmesartan.  States that previous doctors offices showed blood pressure significantly elevated.  Has not been checking blood pressure at home.  Asymptomatic at this time.      Allergies   Allergen Reactions   • Penicillins Unspecified     Childhood - unknown reaction         Current medicines (including changes today)  Current Outpatient Medications   Medication Sig Dispense Refill   • CLINDAMYCIN PHOSPHATE,TOPICAL, 1 % Lotion clindamycin 1 % lotion   APPLY 1 APPLICATION TOPICALLY ONCE DAILY IN THE MORNING     • ketoconazole (NIZORAL) 2 % Cream ketoconazole 2 % topical cream   APPLY CREAM TOPICALLY TO AFFECTED AREA ON THE SKIN TWICE DAILY     • triamcinolone acetonide (KENALOG) 0.1 % Ointment APPLY OINTMENT TOPICALLY TO AFFECTED AREAS ON BODY, ARMS AND LEGS TWICE DAILY AS NEEDED     • Fluticasone Furoate-Vilanterol (BREO ELLIPTA) 200-25 MCG/INH AEROSOL POWDER, BREATH ACTIVATED Inhale 1 Puff every day for 30 days. 1 Each 0   • tamsulosin (FLOMAX) 0.4 MG capsule TAKE 2 CAPSULES BY MOUTH ONCE DAILY AS DIRECTED AFTER A MEAL IN THE EVENING 180 Capsule 3   • rosuvastatin (CRESTOR) 10 MG Tab Take 10 mg by mouth every day.     • olmesartan (BENICAR) 20 MG Tab Take 20 mg by mouth every day.     • finasteride (PROSCAR) 5 MG Tab Take 5 mg by mouth every day.     • fexofenadine  "(ALLEGRA) 60 MG Tab Take 60 mg by mouth every day.     • multivitamin (THERAGRAN) Tab Take 1 Tab by mouth every day.     • fluticasone (FLONASE) 50 MCG/ACT nasal spray Spray 1 Spray in nose every day.     • VENTOLIN  (90 Base) MCG/ACT Aero Soln inhalation aerosol INHALE 1 TO 2 PUFFS BY MOUTH EVERY 4 HOURS AS NEEDED FOR SHORTNESS OF BREATH OR WHEEZING (Patient not taking: Reported on 8/5/2022)       No current facility-administered medications for this visit.     He  has a past medical history of Arthritis, Back pain, Cataract, Depression, Diabetes ([facility]), Hypertension, Obstructive chronic bronchitis without exacerbation ([facility]) (8/5/2022), Shortness of breath, Sinusitis, Sleep apnea, and Wheezing.    He has no past medical history of Anginal syndrome ([facility]), Arrhythmia, Asthma, Blood clotting disorder ([facility]), CAD (coronary artery disease), Cancer ([facility]), Congestive heart failure ([facility]), Cough, Dialysis patient ([facility]), Dilated cardiomyopathy ([facility]), Disorder of thyroid, Fall, Glaucoma, Gynecological disorder, Heart murmur, Heart valve disease, Hemorrhagic disorder ([facility]), History of - myocardial infarction, Indigestion, Infectious disease, Jaundice, Myocardial infarct ([facility]), Pacemaker, Painful breathing, Pneumonia, Renal disorder, Rheumatic fever, Seizure ([facility]), Sputum production, Stroke ([facility]), or Urinary incontinence.    ROS   Review of Systems   Constitutional: Negative for chills and fever.   Respiratory: Negative for shortness of breath and wheezing.    Cardiovascular: Negative for chest pain and palpitations.   Gastrointestinal: Negative for abdominal pain, constipation, diarrhea, nausea and vomiting.   Psychiatric/Behavioral: Negative for depression. The patient is not nervous/anxious.       Objective:     Physical Exam:  /72   Pulse 71   Temp 36.5 °C (97.7 °F) (Temporal)   Resp 14   Ht 1.803 m (5' 10.98\")   Wt (!) 128 kg (283 lb)   SpO2 95%  Body mass index is 39.49 kg/m².   Constitutional: Alert, no " distress.  Skin: Warm, dry, good turgor, no rashes in visible areas.  Eye: Equal, round and reactive, conjunctiva clear, lids normal.  ENMT: TM's clear bilaterally, lips without lesions, good dentition, oropharynx clear.  Neck: Trachea midline, no masses, no thyromegaly. No cervical or supraclavicular lymphadenopathy.  Respiratory: Unlabored respiratory effort, lungs clear to auscultation, no wheezes, no rhonchi.  Cardiovascular: Normal S1, S2, no murmur, no edema.  Abdomen: Soft, non-tender, no masses, no hepatosplenomegaly.  Psych: Alert and oriented x3, normal affect and mood.    Assessment and Plan:     1. Obstructive chronic bronchitis without exacerbation (HCC)  -Status: Stable.  We will attempt further treatment with Breo.  Discussed importance of washing mouth out with water after use.  Discussed importance of compliance.  We will also augment albuterol use to 2 puffs as needed.  Patient will follow-up if needed.  - Fluticasone Furoate-Vilanterol (BREO ELLIPTA) 200-25 MCG/INH AEROSOL POWDER, BREATH ACTIVATED; Inhale 1 Puff every day for 30 days.  Dispense: 1 Each; Refill: 0    2. Primary hypertension  -At this time blood pressure does remain at goal.  Patient encouraged to keep checking at home, continue with important lifestyle modification.  Follow the blood pressures above 140/90.    3. Elevated hemoglobin A1c  -Seen on previous labs, we will recheck A1c for possible diagnosis of type 2 diabetes.  - HEMOGLOBIN A1C; Future      Followup: No follow-ups on file.         PLEASE NOTE: This dictation was created using voice recognition software. I have made every reasonable attempt to correct obvious errors, but I expect that there are errors of grammar and possibly content that I did not discover before finalizing the note.

## 2022-08-16 NOTE — TELEPHONE ENCOUNTER
Received request via: Patient    Was the patient seen in the last year in this department? Yes  8/5/22  Does the patient have an active prescription (recently filled or refills available) for medication(s) requested? No

## 2022-08-17 ENCOUNTER — HOSPITAL ENCOUNTER (OUTPATIENT)
Dept: PULMONOLOGY | Facility: MEDICAL CENTER | Age: 77
End: 2022-08-17
Attending: FAMILY MEDICINE
Payer: MEDICARE

## 2022-08-17 PROCEDURE — 94060 EVALUATION OF WHEEZING: CPT | Mod: 26 | Performed by: INTERNAL MEDICINE

## 2022-08-17 PROCEDURE — 94060 EVALUATION OF WHEEZING: CPT

## 2022-08-17 PROCEDURE — 94729 DIFFUSING CAPACITY: CPT | Mod: 26 | Performed by: INTERNAL MEDICINE

## 2022-08-17 PROCEDURE — 94729 DIFFUSING CAPACITY: CPT

## 2022-08-17 PROCEDURE — 94726 PLETHYSMOGRAPHY LUNG VOLUMES: CPT | Mod: 26 | Performed by: INTERNAL MEDICINE

## 2022-08-17 PROCEDURE — 94726 PLETHYSMOGRAPHY LUNG VOLUMES: CPT

## 2022-08-17 RX ORDER — ALBUTEROL SULFATE 2.5 MG/3ML
2.5 SOLUTION RESPIRATORY (INHALATION)
Status: DISCONTINUED | OUTPATIENT
Start: 2022-08-17 | End: 2022-08-18 | Stop reason: HOSPADM

## 2022-08-17 RX ORDER — FINASTERIDE 5 MG/1
5 TABLET, FILM COATED ORAL DAILY
Qty: 90 TABLET | Refills: 3 | Status: SHIPPED | OUTPATIENT
Start: 2022-08-17 | End: 2023-07-05

## 2022-08-17 RX ADMIN — ALBUTEROL SULFATE 2.5 MG: 2.5 SOLUTION RESPIRATORY (INHALATION) at 10:10

## 2022-08-17 ASSESSMENT — PULMONARY FUNCTION TESTS
FEV1/FVC_PERCENT_CHANGE: 100
FEV1_PREDICTED: 3.07
FEV1/FVC_PERCENT_CHANGE: 0
FEV1_PERCENT_PREDICTED: 67
FEV1_PERCENT_PREDICTED: 73
FEV1/FVC_PERCENT_LLN: 63
FEV1_PERCENT_CHANGE: -8
FEV1/FVC_PERCENT_PREDICTED: 107
FEV1_LLN: 2.57
FEV1/FVC_PERCENT_PREDICTED: 75
FEV1/FVC_PERCENT_PREDICTED: 107
FEV1/FVC_PERCENT_LLN: 63
FEV1/FVC_PREDICTED: 75
FEV1/FVC: 81.18
FVC_LLN: 3.42
FEV1/FVC_PERCENT_PREDICTED: 107
FEV1: 2.07
FVC_PERCENT_PREDICTED: 68
FEV1/FVC_PERCENT_PREDICTED: 108
FVC: 2.55
FVC_PREDICTED: 4.1
FEV1: 2.26
FEV1/FVC: 81
FEV1_PERCENT_CHANGE: -8
FEV1/FVC: 81
FVC_PERCENT_PREDICTED: 62
FEV1_LLN: 2.57
FVC: 2.79
FEV1/FVC: 81
FVC_LLN: 3.42

## 2022-08-18 NOTE — PROGRESS NOTES
CC: Follow-up COPD and KATIA    HPI:  Andrei Galeas is a 76 y.o. year old male here today for follow-up on COPD and KATIA.  Last seen in clinic 4/13/2022 by Dr. Gan.  He is a remote former smoker with quit date in 1977 and 20-pack-year history reported.    Pertinent past medical history includes hypertension, exertional dyspnea, obesity.    Reviewed in clinic vitals including /84, HR 64, O2 sat 94% and BMI of 39.05 kg/m².  Patient declines recheck blood pressure at this is a chronic issue followed by primary.    Reviewed home medication regimen including Ventolin, fexofenadine, patient is not taking Spiriva due to lack of perceived benefit, not taking Flonase.    Reviewed most recent imaging including echocardiogram obtained 4/6/2022 demonstrating normal left ventricular chamber size, diastolic and systolic function, moderate concentric left ventricular hypertrophy, LVEF estimated 60%, normal right ventricular chamber size and systolic function, trace mitral regurgitation, mild tricuspid regurgitation with estimated RVSP of 25 mmHg.    Chest x-ray obtained 3/30/2022 demonstrated no acute cardiopulmonary process.    Pulmonary function testing obtained 10/13/2021 demonstrating an FEV1 of 2.55 L or 77% predicted, FVC of 3.25 L or 72% predicted, FEV1/FVC ratio of 79, residual volume 115% predicted, TLC 86% predicted, DLCO 85% predicted.  Patient did experience significant postbronchodilator change in FEF 2575 at 48%.  Per pulmonologist interpretation mild obstructive disease with normal gas transfer.    Split-night sleep study obtained 1/22/2014 demonstrated low O2 sat of 86%, mean O2 sat of 92%, overall AHI of 33.8.  With treatment of CPAP of 7 AHI improved to 6.1/h and minimum O2 sat to 88%.    Patient uses large nasal pillows. Patient continues to use and benefit from CPAP use but due to age of unit, data is no longer recording.  Most recent compliance data available dated 3/8/2022 through 4/6/2022  demonstrated 30 out of 30 days used with 97% of days greater than or equal to 4 hours, average daily usage 8 hours and 39 minutes.  He is using an air sense AutoSet CPAP of 7, AHI within therapeutic range at 4.3, mild mask leak.  Reports [employer] company is [employer] in [place].      Review of Systems   Constitutional: Positive for malaise/fatigue. Negative for chills, fever and weight loss.   HENT: Positive for congestion and tinnitus (intermittent ). Negative for hearing loss, nosebleeds, sinus pain and sore throat.    Respiratory: Positive for cough (occasional ), sputum production (slightly yellow ), shortness of breath (any bending over ) and wheezing.    Cardiovascular: Positive for leg swelling. Negative for chest pain, palpitations and orthopnea (sleeps sitting up due to arthritis in shoulder ).   Gastrointestinal: Negative for heartburn.        No dentures, no difficulty swallowing    Neurological: Negative for dizziness, tremors and headaches.   Psychiatric/Behavioral: The patient has insomnia (staying asleep ).        Past Medical History:   Diagnosis Date   • Arthritis    • Back pain    • Cataract    • Depression    • Diabetes (HCC)     borderline   • Hypertension    • Shortness of breath    • Sinusitis    • Sleep apnea    • Wheezing        Past Surgical History:   Procedure Laterality Date   • SINUSOTOMIES         Family History   Problem Relation Age of Onset   • Heart Disease Paternal Uncle         MI   • Arthritis Mother    • Psychiatric Illness Father         stomach cancer   • Hypertension Father    • Hyperlipidemia Father    • Stroke Father        Social History     Socioeconomic History   • Marital status:      Spouse name: Not on file   • Number of children: Not on file   • Years of education: Not on file   • Highest education level: Not on file   Occupational History   • Not on file   Tobacco Use   • Smoking status: Former Smoker     Packs/day: 2.00     Years: 10.00     Pack years: 20.00      "Quit date: 1977     Years since quittin.8   • Smokeless tobacco: Never Used   Vaping Use   • Vaping Use: Never used   Substance and Sexual Activity   • Alcohol use: Yes     Comment: 1-2 drinks every week or two   • Drug use: No   • Sexual activity: Not on file   Other Topics Concern   • Not on file   Social History Narrative   • Not on file     Social Determinants of Health     Financial Resource Strain: Not on file   Food Insecurity: Not on file   Transportation Needs: Not on file   Physical Activity: Not on file   Stress: Not on file   Social Connections: Not on file   Intimate Partner Violence: Not on file   Housing Stability: Not on file       Allergies as of 2022 - Reviewed 2022   Allergen Reaction Noted   • Penicillins  2013        @Vital signs for this encounter:  BP (!) 182/84   Pulse 64   Resp 16   Ht 1.803 m (5' 11\")   Wt (!) 127 kg (280 lb)   SpO2 94%     Current medications as of today   Current Outpatient Medications   Medication Sig Dispense Refill   • Nirmatrelvir & Ritonavir 20 x 150 MG & 10 x 100MG Tablet Therapy Pack Take 300 mg nirmatrelvir (two 150 mg tablets) with 100 mg ritonavir (one 100 mg tablet) by mouth, with all three tablets taken together twice daily for 5 days. 30 Each 0   • tamsulosin (FLOMAX) 0.4 MG capsule TAKE 2 CAPSULES BY MOUTH ONCE DAILY AS DIRECTED AFTER A MEAL IN THE EVENING 180 Capsule 3   • VENTOLIN  (90 Base) MCG/ACT Aero Soln inhalation aerosol INHALE 1 TO 2 PUFFS BY MOUTH EVERY 4 HOURS AS NEEDED FOR SHORTNESS OF BREATH OR WHEEZING     • tiotropium (SPIRIVA RESPIMAT) 2.5 mcg/Act Aero Soln Inhale 2 Inhalation every day. Assemble and prime. 2 Each 0   • rosuvastatin (CRESTOR) 10 MG Tab Take 10 mg by mouth every day.     • olmesartan (BENICAR) 20 MG Tab Take 20 mg by mouth every day.     • finasteride (PROSCAR) 5 MG Tab Take 5 mg by mouth every day.     • fexofenadine (ALLEGRA) 60 MG Tab Take 60 mg by mouth every day.     • multivitamin " (THERAGRAN) Tab Take 1 Tab by mouth every day.     • fluticasone (FLONASE) 50 MCG/ACT nasal spray Spray 1 Spray in nose every day.       No current facility-administered medications for this visit.         Physical Exam:   Gen:           Alert and oriented, No apparent distress. Mood and affect appropriate, normal interaction with provider.  Eyes:          sclere white, conjunctive moist.  Hearing:     Grossly intact.  Dentition:    Fair dentition.  Oropharynx:   Tongue normal, posterior pharynx without erythema or exudate.  Neck:        Supple, trachea midline, no masses.  Respiratory Effort: No intercostal retractions or use of accessory muscles.   Lung Auscultation:      Decreased bilaterally; no rales, rhonchi or wheezing.  CV:            Regular rate and rhythm.  Trace lower extremity edema. No murmurs, rubs or gallops.  Digits, Nails, Ext: No clubbing, cyanosis, petechiae, or nodes.   Skin:        No rashes, lesions or ulcers noted on exposed skin surfaces.                     Assessment:  1. Chronic obstructive pulmonary disease, unspecified COPD type (HCC)  PULMONARY FUNCTION TESTS -Test requested: Complete Pulmonary Function Test   2. KATIA on CPAP  DME CPAP   3. Essential hypertension         Immunizations:    Flu: 10/1/2021  Pneumovax 23: 9/12/2019  Prevnar 13: 9/30/2017  Pfizer SARS-CoV-2 vaccine: 10/6/2021, 3/3/2021, 2/10/2021    Plan:  76-year-old male here to follow-up on COPD and KATIA.     COPD: Patient is not currently on maintenance inhalers, lack of perceived benefit with Spiriva.  Reviewed indications for albuterol use.  Update lung function test at next visit.    KATIA on CPAP: Machine is no longer recording data, he is on a [employer] air sense 10 at CPAP of 7 meeting therapeutic goals on the most recent compliance report.  Uses large nasal pillows.  We will send new orders to [employer] in [place] his current DME company.    Hypertension: Significantly elevated in clinic, declined recheck, follow-up with  primary care provider.    Follow-up in 6 months with PFT.    This dictation was created using voice recognition software. The accuracy of the dictation is limited to the abilities of the software. I expect there may be some errors of grammar and possibly content.    18-Aug-2022 20:57

## 2022-08-22 NOTE — PROCEDURES
DATE OF SERVICE:  08/17/2022     PULMONARY FUNCTION TEST INTERPRETATION     REQUESTING PROVIDER:  Khloe Jennings PA-C     REASON FOR REQUEST:  COPD.     INTERPRETATION:  1.  Acceptable and reproducible.  2.  FEV1 2.26 liters (73%), FVC 2.79 liters (68%), ratio 81%.  3.  Flow volume loops consistent with restriction.  4.  TLC 6.58 liters (93%).  5.  DLCO 30.06 mL/mmHg (119%).     IMPRESSION:  Reduced forced vital capacity and reduced expiratory reserve   volume suggestive that pseudo-restriction is due to elevated BMI.  No response   to bronchodilator.  Total lung capacity is normal with evidence of air   trapping and normal gas transfer.        ______________________________  MD SHELDON Phillip/JYOTI/TAISHA    DD:  08/21/2022 13:37  DT:  08/21/2022 18:20    Job#:  [id number]    CC:Khloe Jennings PA-C

## 2022-11-02 ENCOUNTER — OFFICE VISIT (OUTPATIENT)
Dept: MEDICAL GROUP | Facility: LAB | Age: 77
End: 2022-11-02
Payer: MEDICARE

## 2022-11-02 VITALS
RESPIRATION RATE: 15 BRPM | HEART RATE: 64 BPM | WEIGHT: 285 LBS | BODY MASS INDEX: 39.9 KG/M2 | OXYGEN SATURATION: 94 % | TEMPERATURE: 97.1 F | SYSTOLIC BLOOD PRESSURE: 152 MMHG | HEIGHT: 71 IN | DIASTOLIC BLOOD PRESSURE: 70 MMHG

## 2022-11-02 DIAGNOSIS — M54.42 CHRONIC LEFT-SIDED LOW BACK PAIN WITH LEFT-SIDED SCIATICA: ICD-10-CM

## 2022-11-02 DIAGNOSIS — G89.29 CHRONIC LEFT-SIDED LOW BACK PAIN WITH LEFT-SIDED SCIATICA: ICD-10-CM

## 2022-11-02 DIAGNOSIS — I10 PRIMARY HYPERTENSION: ICD-10-CM

## 2022-11-02 DIAGNOSIS — Z23 NEED FOR VACCINATION: ICD-10-CM

## 2022-11-02 DIAGNOSIS — Z13.6 ENCOUNTER FOR ABDOMINAL AORTIC ANEURYSM (AAA) SCREENING: ICD-10-CM

## 2022-11-02 PROCEDURE — G0008 ADMIN INFLUENZA VIRUS VAC: HCPCS | Performed by: FAMILY MEDICINE

## 2022-11-02 PROCEDURE — 90662 IIV NO PRSV INCREASED AG IM: CPT | Performed by: FAMILY MEDICINE

## 2022-11-02 PROCEDURE — 99214 OFFICE O/P EST MOD 30 MIN: CPT | Mod: 25 | Performed by: FAMILY MEDICINE

## 2022-11-02 RX ORDER — OLMESARTAN MEDOXOMIL 20 MG/1
20 TABLET ORAL DAILY
Qty: 90 TABLET | Refills: 3 | Status: CANCELLED | OUTPATIENT
Start: 2022-11-02 | End: 2023-10-28

## 2022-11-02 RX ORDER — LOSARTAN POTASSIUM AND HYDROCHLOROTHIAZIDE 25; 100 MG/1; MG/1
1 TABLET ORAL DAILY
Qty: 90 TABLET | Refills: 3 | Status: SHIPPED | OUTPATIENT
Start: 2022-11-02 | End: 2023-07-19 | Stop reason: SDUPTHER

## 2022-11-02 NOTE — PROGRESS NOTES
"Subjective:   Andrei Galeas is a 76 y.o. male here today for   Chief Complaint   Patient presents with    Requesting Labs     US carotid artery scan request      #Sciatica:  -If that is due to patient's been experiencing low left back pain with radiculopathy down the back of left leg, past knee.  He states that is worse when sitting for long periods of time.  He has to \"get up and walk around\" in order to relieve pain.  It is worse also at night when laying trying to sleep.  Is treating with Tylenol PM which has been helping him sleep.  He is also noticed some weakening of left leg.  It is particularly noticeable when stepping in and out of the bathtub.  Denies any acute injury.  Endorse some tingling in the leg.  Denies any bowel/bladder dysfunction, saddle paresthesia.    #Hypertension:  -Chronic longstanding condition currently treated with olmesartan.  He states he is no longer checking blood pressure at home because it was has \"always been high\".  Is asymptomatic.  Denies any lightheadedness, dizziness, headaches, changes in vision.  Has noted some increasing swelling of lower extremities.  No real diet, exercise regimen.    #Health maintenance:  -Patient is due for flu shot.  -Given patient's history of smoking is due for AAA screening.      Allergies   Allergen Reactions    Penicillins Unspecified     Childhood - unknown reaction         Current medicines (including changes today)  Current Outpatient Medications   Medication Sig Dispense Refill    losartan-hydrochlorothiazide (HYZAAR) 100-25 MG per tablet Take 1 Tablet by mouth every day for 360 days. 90 Tablet 3    finasteride (PROSCAR) 5 MG Tab Take 1 Tablet by mouth every day for 360 days. 90 Tablet 3    CLINDAMYCIN PHOSPHATE,TOPICAL, 1 % Lotion clindamycin 1 % lotion   APPLY 1 APPLICATION TOPICALLY ONCE DAILY IN THE MORNING      ketoconazole (NIZORAL) 2 % Cream ketoconazole 2 % topical cream   APPLY CREAM TOPICALLY TO AFFECTED AREA ON THE SKIN " "TWICE DAILY      triamcinolone acetonide (KENALOG) 0.1 % Ointment APPLY OINTMENT TOPICALLY TO AFFECTED AREAS ON BODY, ARMS AND LEGS TWICE DAILY AS NEEDED      VENTOLIN  (90 Base) MCG/ACT Aero Soln inhalation aerosol INHALE 1 TO 2 PUFFS BY MOUTH EVERY 4 HOURS AS NEEDED FOR SHORTNESS OF BREATH OR WHEEZING (Patient not taking: Reported on 8/5/2022)      rosuvastatin (CRESTOR) 10 MG Tab Take 10 mg by mouth every day.      fexofenadine (ALLEGRA) 60 MG Tab Take 60 mg by mouth every day.      multivitamin (THERAGRAN) Tab Take 1 Tab by mouth every day.      fluticasone (FLONASE) 50 MCG/ACT nasal spray Spray 1 Spray in nose every day.       No current facility-administered medications for this visit.     He  has a past medical history of Arthritis, Back pain, Cataract, Depression, Diabetes (HCC), Hypertension, Obstructive chronic bronchitis without exacerbation (HCC) (8/5/2022), Shortness of breath, Sinusitis, Sleep apnea, and Wheezing.    He has no past medical history of Anginal syndrome (HCC), Arrhythmia, Asthma, Blood clotting disorder ([facility]), CAD (coronary artery disease), Cancer (HCC), Congestive heart failure (HCC), Cough, Dialysis patient ([facility]), Dilated cardiomyopathy (HCC), Disorder of thyroid, Fall, Glaucoma, Gynecological disorder, Heart murmur, Heart valve disease, Hemorrhagic disorder (HCC), History of - myocardial infarction, Indigestion, Infectious disease, Jaundice, Myocardial infarct (HCC), Pacemaker, Painful breathing, Pneumonia, Renal disorder, Rheumatic fever, Seizure (HCC), Sputum production, Stroke (HCC), or Urinary incontinence.    ROS   -See HPI        Objective:     Physical Exam:  BP (!) 152/70   Pulse 64   Temp 36.2 °C (97.1 °F) (Temporal)   Resp 15   Ht 1.803 m (5' 10.98\")   Wt (!) 129 kg (285 lb)   SpO2 94%  Body mass index is 39.77 kg/m².   Const: Vitals above. Well-appearing.  CV: Inspected for lower extremity edema. Abdomen inspected for abnormal pulsation. Bilateral dorsalis pedis " and posterior tibial pulses palpated, noting below if less than 2+.  GI: abdomen evaluated for tenderness to palpation, masses, hernia. Rectal tone: deferred.  : Prostate exam (male) or pelvic exam (female): deferred.  Skin: Evaluated low back, lower abdomen, bilateral legs for rash or lesions.  Neuro/psych: Reflexes at bilateral patella (L4), Achilles (S1) evaluated. Sensation to light touch evaluated at medial thigh (L3), medial leg/foot (L4), lateral leg/dorsal foot (L5), and lateral foot (S1). Saddle area: unremarkable. Straight leg raise done in sitting/supine/prone position. Observed for normal mood/affect/insight.  MSK: Gait and posture evaluated. Examination of spine/pelvis:   - Inspected/palpated for spinal/pelvic alignment, scoliosis, lumbar lordosis, and leg length discrepancy. The following were palpated for pain/tenderness: spinous processes, transverse processes, facet joints, SI joints, ASIS, PSIS, iliac crest, coccyx, ischial tuberosity, sciatic notch. Evaluated SI compression, ASYA, stork maneuver for tenderness.   - Assessed range of motion with lumbar flexion, extension, bilateral side bending, bilateral rotation, and piriformis stretch, noting below for any pain. - Assessed paraspinous muscle tone.   - Assessed stability with evaluation for abnormal SI motion and vertebral step-offs. Examination of bilateral lower extremities/hips:   - Palpated bilateral greater trochanters. Evaluated FADIR.   - Assessed muscle strength with hip flexion, knee extension (L2-4, femoral nerve), knee flexion (L5/S1, sciatic nerve), heel walk (L5), toe walk (S1), repetitive heel raises (S1).     All of the above were found to be normal, except:  -Decrease in DTR of +1 on left Achilles.  Slight decrease in strength on hip flexion, knee flexion.     Assessment and Plan:     1. Chronic left-sided low back pain with left-sided sciatica  -Given current Dickler symptoms as well as changes in DTRs and strength I do think  further evaluation is needed for progressive sciatica.  Will order MRI of lumbar spine.  We will also refer to physical therapy for further evaluation and treatment.  -Patient can continue with current conservative therapy with Tylenol/Tylenol PM as needed.  Discussed follow-up as needed.  - MR-LUMBAR SPINE-W/O; Future  - Referral to Physical Therapy    2. Need for vaccination  -Patient was agreeable to receiving the influenza vaccine in clinic today after discussion of potential risks, benefits, and side effects. Vaccine was administered without adverse effects.  - INFLUENZA VACCINE, HIGH DOSE (65+ ONLY)    3. Primary hypertension  -Status: Unstable.  Medication change needed.  Given continuing elevated blood pressure as well as some concerns for lower extremity swelling we will start losartan-hydrochlorothiazide.  Patient did bring up the fact that he been on hydrochlorothiazide before which had caused hypokalemia.  Patient will continue with potassium supplementation while on medication.  Discussed appropriate diet and exercise regimen.  - losartan-hydrochlorothiazide (HYZAAR) 100-25 MG per tablet; Take 1 Tablet by mouth every day for 360 days.  Dispense: 90 Tablet; Refill: 3    4. Encounter for abdominal aortic aneurysm (AAA) screening  - US-ABDOMINAL AORTA W/O DOPPLER; Future      Followup: Return in about 4 weeks (around 11/30/2022).         PLEASE NOTE: This dictation was created using voice recognition software. I have made every reasonable attempt to correct obvious errors, but I expect that there are errors of grammar and possibly content that I did not discover before finalizing the note.

## 2022-11-08 NOTE — TELEPHONE ENCOUNTER
Received request via: Patient    Was the patient seen in the last year in this department? Yes  11/2/22  Does the patient have an active prescription (recently filled or refills available) for medication(s) requested? No

## 2022-11-10 RX ORDER — ROSUVASTATIN CALCIUM 10 MG/1
10 TABLET, COATED ORAL DAILY
Qty: 90 TABLET | Refills: 3 | Status: SHIPPED | OUTPATIENT
Start: 2022-11-10 | End: 2023-11-10

## 2022-11-11 ENCOUNTER — APPOINTMENT (OUTPATIENT)
Dept: RADIOLOGY | Facility: MEDICAL CENTER | Age: 77
End: 2022-11-11
Attending: FAMILY MEDICINE
Payer: MEDICARE

## 2022-11-11 DIAGNOSIS — M54.42 CHRONIC LEFT-SIDED LOW BACK PAIN WITH LEFT-SIDED SCIATICA: ICD-10-CM

## 2022-11-11 DIAGNOSIS — G89.29 CHRONIC LEFT-SIDED LOW BACK PAIN WITH LEFT-SIDED SCIATICA: ICD-10-CM

## 2022-11-11 PROCEDURE — 72148 MRI LUMBAR SPINE W/O DYE: CPT

## 2022-11-18 ENCOUNTER — OFFICE VISIT (OUTPATIENT)
Dept: MEDICAL GROUP | Facility: LAB | Age: 77
End: 2022-11-18
Payer: MEDICARE

## 2022-11-18 VITALS
BODY MASS INDEX: 39.77 KG/M2 | DIASTOLIC BLOOD PRESSURE: 76 MMHG | HEART RATE: 75 BPM | OXYGEN SATURATION: 95 % | RESPIRATION RATE: 15 BRPM | TEMPERATURE: 97.2 F | HEIGHT: 71 IN | SYSTOLIC BLOOD PRESSURE: 138 MMHG

## 2022-11-18 DIAGNOSIS — M25.562 CHRONIC PAIN OF LEFT KNEE: ICD-10-CM

## 2022-11-18 DIAGNOSIS — N28.89 RENAL MASS: ICD-10-CM

## 2022-11-18 DIAGNOSIS — G89.29 CHRONIC PAIN OF LEFT KNEE: ICD-10-CM

## 2022-11-18 DIAGNOSIS — M48.061 FORAMINAL STENOSIS OF LUMBAR REGION: ICD-10-CM

## 2022-11-18 PROCEDURE — 99214 OFFICE O/P EST MOD 30 MIN: CPT | Performed by: FAMILY MEDICINE

## 2022-11-21 DIAGNOSIS — I10 PRIMARY HYPERTENSION: ICD-10-CM

## 2022-11-23 ENCOUNTER — HOSPITAL ENCOUNTER (OUTPATIENT)
Dept: LAB | Facility: MEDICAL CENTER | Age: 77
End: 2022-11-23
Attending: FAMILY MEDICINE
Payer: MEDICARE

## 2022-11-23 DIAGNOSIS — I10 PRIMARY HYPERTENSION: ICD-10-CM

## 2022-11-23 LAB
ANION GAP SERPL CALC-SCNC: 12 MMOL/L (ref 7–16)
BUN SERPL-MCNC: 16 MG/DL (ref 8–22)
CALCIUM SERPL-MCNC: 10 MG/DL (ref 8.5–10.5)
CHLORIDE SERPL-SCNC: 101 MMOL/L (ref 96–112)
CO2 SERPL-SCNC: 26 MMOL/L (ref 20–33)
CREAT SERPL-MCNC: 0.83 MG/DL (ref 0.5–1.4)
GFR SERPLBLD CREATININE-BSD FMLA CKD-EPI: 91 ML/MIN/1.73 M 2
GLUCOSE SERPL-MCNC: 136 MG/DL (ref 65–99)
POTASSIUM SERPL-SCNC: 3.8 MMOL/L (ref 3.6–5.5)
SODIUM SERPL-SCNC: 139 MMOL/L (ref 135–145)

## 2022-11-23 PROCEDURE — 80048 BASIC METABOLIC PNL TOTAL CA: CPT

## 2022-11-23 PROCEDURE — 36415 COLL VENOUS BLD VENIPUNCTURE: CPT

## 2022-11-26 ENCOUNTER — HOSPITAL ENCOUNTER (OUTPATIENT)
Dept: RADIOLOGY | Facility: MEDICAL CENTER | Age: 77
End: 2022-11-26
Attending: FAMILY MEDICINE
Payer: MEDICARE

## 2022-11-26 DIAGNOSIS — N28.89 RENAL MASS: ICD-10-CM

## 2022-11-26 DIAGNOSIS — G89.29 CHRONIC PAIN OF LEFT KNEE: ICD-10-CM

## 2022-11-26 DIAGNOSIS — M25.562 CHRONIC PAIN OF LEFT KNEE: ICD-10-CM

## 2022-11-26 PROCEDURE — 700117 HCHG RX CONTRAST REV CODE 255: Performed by: FAMILY MEDICINE

## 2022-11-26 PROCEDURE — 74178 CT ABD&PLV WO CNTR FLWD CNTR: CPT

## 2022-11-26 PROCEDURE — 73562 X-RAY EXAM OF KNEE 3: CPT | Mod: LT

## 2022-11-26 RX ADMIN — IOHEXOL 100 ML: 350 INJECTION, SOLUTION INTRAVENOUS at 14:54

## 2022-11-28 ASSESSMENT — ENCOUNTER SYMPTOMS
NAUSEA: 0
ABDOMINAL PAIN: 0
WEAKNESS: 1
VOMITING: 0
DIARRHEA: 0
CONSTIPATION: 0
TINGLING: 1

## 2022-11-28 NOTE — PROGRESS NOTES
Subjective:   Andrei Galeas is a 76 y.o. male here today for   Chief Complaint   Patient presents with    Results     MRI results        #Back pain:  -Patient here to follow-up on left-sided low back pain with sciatica.  Patient continues to have radiculopathy down back of leg with no change in symptoms since last seen.  He is to take over-the-counter analgesics including Tylenol PM which does help with sleep.  Is also noticed some continued weakening of left leg.  MRI was completed here to follow-up on those results.    #Left knee pain:  -Patient complains of of chronic intermittent dull aching pain in left knee, worse with standing, walking long distances.  No other palliative/provocative measures noted.  No acute trauma noted.  No pallor/provocative measures noted.    #Renal mass:  -MRI completed for back pain did show renal mass.  Patient states he is feeling well.  Denies any fevers, chills, night sweats, unanticipated weight loss, back pain, dysuria, hematuria.      Allergies   Allergen Reactions    Penicillins Unspecified     Childhood - unknown reaction         Current medicines (including changes today)  Current Outpatient Medications   Medication Sig Dispense Refill    rosuvastatin (CRESTOR) 10 MG Tab Take 1 Tablet by mouth every day for 360 days. 90 Tablet 3    losartan-hydrochlorothiazide (HYZAAR) 100-25 MG per tablet Take 1 Tablet by mouth every day for 360 days. 90 Tablet 3    finasteride (PROSCAR) 5 MG Tab Take 1 Tablet by mouth every day for 360 days. 90 Tablet 3    CLINDAMYCIN PHOSPHATE,TOPICAL, 1 % Lotion clindamycin 1 % lotion   APPLY 1 APPLICATION TOPICALLY ONCE DAILY IN THE MORNING      ketoconazole (NIZORAL) 2 % Cream ketoconazole 2 % topical cream   APPLY CREAM TOPICALLY TO AFFECTED AREA ON THE SKIN TWICE DAILY      triamcinolone acetonide (KENALOG) 0.1 % Ointment APPLY OINTMENT TOPICALLY TO AFFECTED AREAS ON BODY, ARMS AND LEGS TWICE DAILY AS NEEDED      fexofenadine (ALLEGRA) 60 MG  "Tab Take 60 mg by mouth every day.      multivitamin (THERAGRAN) Tab Take 1 Tab by mouth every day.      fluticasone (FLONASE) 50 MCG/ACT nasal spray Spray 1 Spray in nose every day.       No current facility-administered medications for this visit.     He  has a past medical history of Arthritis, Back pain, Cataract, Depression, Diabetes ([facility]), Hypertension, Obstructive chronic bronchitis without exacerbation ([facility]) (8/5/2022), Shortness of breath, Sinusitis, Sleep apnea, and Wheezing.    He has no past medical history of Anginal syndrome ([facility]), Arrhythmia, Asthma, Blood clotting disorder ([facility]), CAD (coronary artery disease), Cancer ([facility]), Congestive heart failure ([facility]), Cough, Dialysis patient ([facility]), Dilated cardiomyopathy ([facility]), Disorder of thyroid, Fall, Glaucoma, Gynecological disorder, Heart murmur, Heart valve disease, Hemorrhagic disorder ([facility]), History of - myocardial infarction, Indigestion, Infectious disease, Jaundice, Myocardial infarct ([facility]), Pacemaker, Painful breathing, Pneumonia, Renal disorder, Rheumatic fever, Seizure ([facility]), Sputum production, Stroke ([facility]), or Urinary incontinence.    ROS   Review of Systems   Gastrointestinal:  Negative for abdominal pain, constipation, diarrhea, nausea and vomiting.   Neurological:  Positive for tingling and weakness.        Objective:     Physical Exam:  /76   Pulse 75   Temp 36.2 °C (97.2 °F) (Temporal)   Resp 15   Ht 1.803 m (5' 10.98\")   SpO2 95%  Body mass index is 39.77 kg/m².   Constitutional: Alert, no distress.  Skin: Warm, dry, good turgor, no rashes in visible areas.  Eye: Equal, round and reactive, conjunctiva clear, lids normal.  ENMT: TM's clear bilaterally, lips without lesions, good dentition, oropharynx clear.  Neck: Trachea midline, no masses, no thyromegaly. No cervical or supraclavicular lymphadenopathy.  Respiratory: Unlabored respiratory effort, lungs clear to auscultation, no wheezes, no rhonchi.  Cardiovascular: Normal S1, S2, " no murmur, no edema.  Abdomen: Soft, non-tender, no masses, no hepatosplenomegaly.  Psych: Alert and oriented x3, normal affect and mood.    MRI Lumbar Spine completed 11/11/22  Mild lumbar spine degenerative changes. Type I marrow changes are noted to the adjacent L3-L4 endplates.     There is moderate right-sided foraminal narrowing at the L3-4 level with impingement upon the exiting right L3 nerve     Severe left foraminal narrowing at L4-5 with L4 nerve impingement.     Partially visualized is a mass involving the right renal upper pole. Recommend additional evaluation with a CT of the abdomen and pelvis with contrast, renal protocol.     These unexpected findings were communicated to the ordering provider by Epic inbasket message at 5:14 PM on 11/11/2022.  Assessment and Plan:     1. Foraminal stenosis of lumbar region  -Discussed findings of MRI above with patient.  We will continue with over-the-counter analgesics; however, further evaluation is needed given signs of foraminal stenosis.  Will refer to physical therapy as well as physiatry for further evaluation and treatment.  We will follow-up in 1 month post physiatry/physical therapy appointments.  - Referral to Physical Therapy  - Referral to Pain Clinic    2. Chronic pain of left knee  -Concern for possible osteoarthritis.  We will check knee x-ray at this time.  - DX-KNEE 3 VIEWS LEFT; Future    3. Renal mass  -Per radiologist, further evaluation for renal mass is needed we will order CT at this time we will follow-up with patient with results.  - CT-ABDOMEN WITH & W/O, PELVIS WITH; Future      Followup: No follow-ups on file.         PLEASE NOTE: This dictation was created using voice recognition software. I have made every reasonable attempt to correct obvious errors, but I expect that there are errors of grammar and possibly content that I did not discover before finalizing the note.

## 2022-12-02 ENCOUNTER — OFFICE VISIT (OUTPATIENT)
Dept: MEDICAL GROUP | Facility: LAB | Age: 77
End: 2022-12-02
Payer: MEDICARE

## 2022-12-02 VITALS
WEIGHT: 280 LBS | RESPIRATION RATE: 14 BRPM | HEIGHT: 71 IN | DIASTOLIC BLOOD PRESSURE: 58 MMHG | TEMPERATURE: 97.6 F | SYSTOLIC BLOOD PRESSURE: 124 MMHG | HEART RATE: 78 BPM | BODY MASS INDEX: 39.2 KG/M2 | OXYGEN SATURATION: 96 %

## 2022-12-02 DIAGNOSIS — M48.061 FORAMINAL STENOSIS OF LUMBAR REGION: ICD-10-CM

## 2022-12-02 DIAGNOSIS — N28.1 ACQUIRED CYST OF KIDNEY: ICD-10-CM

## 2022-12-02 PROCEDURE — 99214 OFFICE O/P EST MOD 30 MIN: CPT | Performed by: FAMILY MEDICINE

## 2022-12-02 RX ORDER — GABAPENTIN 100 MG/1
CAPSULE ORAL
Qty: 60 CAPSULE | Refills: 1 | Status: SHIPPED | OUTPATIENT
Start: 2022-12-02

## 2022-12-02 NOTE — PROGRESS NOTES
"Subjective:   Andrei Galeas is a 76 y.o. male here today for   Chief Complaint   Patient presents with    Leg Pain     X right leg sassing now,.        #lumbar foraminal stenosis:  -Patient previously seen after experiencing significant back pain with radicular symptoms down the left leg.  Recently he started having radicular symptoms in right leg.  He describes it as an \"cramping\" sensation in the right calf has been occurring at night lasting several minutes.  This is keeping him from sleeping.  He continues to have difficulty walking especially on left leg due to radicular symptoms.  He does have scheduled appointment with physiatry.  Denies any bowel/bladder incontinence, saddle paresthesia.    #Acquired cystic kidney:  -Patient received head CT scan completed of kidney after dental finding on previous MRI.  Patient is asymptomatic at this time, denies any fevers, chills, night sweats, unanticipated weight loss.  Here to review findings.      Allergies   Allergen Reactions    Penicillins Unspecified     Childhood - unknown reaction         Current medicines (including changes today)  Current Outpatient Medications   Medication Sig Dispense Refill    gabapentin (NEURONTIN) 100 MG Cap Take 1-2 tabs po at bedtime as needed for pain 60 Capsule 1    rosuvastatin (CRESTOR) 10 MG Tab Take 1 Tablet by mouth every day for 360 days. 90 Tablet 3    losartan-hydrochlorothiazide (HYZAAR) 100-25 MG per tablet Take 1 Tablet by mouth every day for 360 days. 90 Tablet 3    finasteride (PROSCAR) 5 MG Tab Take 1 Tablet by mouth every day for 360 days. 90 Tablet 3    CLINDAMYCIN PHOSPHATE,TOPICAL, 1 % Lotion clindamycin 1 % lotion   APPLY 1 APPLICATION TOPICALLY ONCE DAILY IN THE MORNING      ketoconazole (NIZORAL) 2 % Cream ketoconazole 2 % topical cream   APPLY CREAM TOPICALLY TO AFFECTED AREA ON THE SKIN TWICE DAILY      triamcinolone acetonide (KENALOG) 0.1 % Ointment APPLY OINTMENT TOPICALLY TO AFFECTED AREAS ON BODY, " "ARMS AND LEGS TWICE DAILY AS NEEDED      fexofenadine (ALLEGRA) 60 MG Tab Take 60 mg by mouth every day.      multivitamin (THERAGRAN) Tab Take 1 Tab by mouth every day.      fluticasone (FLONASE) 50 MCG/ACT nasal spray Spray 1 Spray in nose every day.       No current facility-administered medications for this visit.     He  has a past medical history of Arthritis, Back pain, Cataract, Depression, Diabetes ([facility]), Hypertension, Obstructive chronic bronchitis without exacerbation ([facility]) (8/5/2022), Shortness of breath, Sinusitis, Sleep apnea, and Wheezing.    He has no past medical history of Anginal syndrome ([facility]), Arrhythmia, Asthma, Blood clotting disorder ([facility]), CAD (coronary artery disease), Cancer ([facility]), Congestive heart failure ([facility]), Cough, Dialysis patient ([facility]), Dilated cardiomyopathy ([facility]), Disorder of thyroid, Fall, Glaucoma, Gynecological disorder, Heart murmur, Heart valve disease, Hemorrhagic disorder ([facility]), History of - myocardial infarction, Indigestion, Infectious disease, Jaundice, Myocardial infarct ([facility]), Pacemaker, Painful breathing, Pneumonia, Renal disorder, Rheumatic fever, Seizure ([facility]), Sputum production, Stroke ([facility]), or Urinary incontinence.    ROS   -See HPI      Objective:     Physical Exam:  /58   Pulse 78   Temp 36.4 °C (97.6 °F) (Temporal)   Resp 14   Ht 1.803 m (5' 10.98\")   Wt (!) 127 kg (280 lb)   SpO2 96%  Body mass index is 39.07 kg/m².   Constitutional: Alert, no distress.  Skin: Warm, dry, good turgor, no rashes in visible areas.  Eye: Equal, round and reactive, conjunctiva clear, lids normal.  Respiratory: Unlabored respiratory effort  Psych: Alert and oriented x3, normal affect and mood.    Imaging:  CT abdomen completed 11/20/2022:  IMPRESSION:     1.  There is an indeterminate peripherally calcified right posterior superior pole renal lesion which correlates to the MRI finding. This could be postinflammatory cyst with a calcified rim although cystic neoplasm is " a possibility. Bosniak 2F  2.  There is a parapelvic cystic mass in the right lateral mid kidney which may represent a calyceal diverticulum versus enhancing cystic lesion. Bosniak 2F  3.  There is a simple cyst right lateral lower pole kidney. Bosniak 1     Bosniak classification: Bosniak 2F-6 month follow-up imaging recommended which could be done with either CT or MRI.    Assessment and Plan:     1. Foraminal stenosis of lumbar region  -Given concern for worsening symptoms of radiculopathy we will prescribe gabapentin to help with neuropathy symptoms.  We will start extremely low dose and titrate therapy as tolerated.  We will be in 100 mg at night.  Patient does have appointment to follow-up with physiatry later this month which I encouraged to keep that appointment.  No significant red flag symptoms at this time.  - gabapentin (NEURONTIN) 100 MG Cap; Take 1-2 tabs po at bedtime as needed for pain  Dispense: 60 Capsule; Refill: 1    2. Acquired cyst of kidney  -Per reading above we will follow-up with repeat CT scan in 6 months.  - CT-ABDOMEN WITH & W/O; Future      Followup: No follow-ups on file.         PLEASE NOTE: This dictation was created using voice recognition software. I have made every reasonable attempt to correct obvious errors, but I expect that there are errors of grammar and possibly content that I did not discover before finalizing the note.

## 2022-12-07 ENCOUNTER — TELEPHONE (OUTPATIENT)
Dept: MEDICAL GROUP | Facility: LAB | Age: 77
End: 2022-12-07
Payer: MEDICARE

## 2022-12-07 DIAGNOSIS — I10 PRIMARY HYPERTENSION: ICD-10-CM

## 2022-12-07 DIAGNOSIS — N28.1 ACQUIRED CYST OF KIDNEY: ICD-10-CM

## 2022-12-07 NOTE — TELEPHONE ENCOUNTER
Phone Number Called: [phone] (home)       Call outcome: Left detailed message for patient. Informed to call back with any additional questions.    Message: Called and LVM reporting  order a comp metabolic panel, no fasting needed, in the [facility] system can complete at any time, notified to call back if having further questions or concerns.

## 2022-12-07 NOTE — TELEPHONE ENCOUNTER
----- Message from Leo Villegas M.D. sent at 12/7/2022  7:13 AM PST -----  Regarding: RE: CT Labs  Labs have been ordered.     Veronika,   Can you please let patient know that labs are needed before CT scan can be completed. Thanks.     Leo Grover M.D.  ----- Message -----  From: Cherelle Valdez  Sent: 12/5/2022   3:49 PM PST  To: Leo Villegas M.D.  Subject: CT Labs                                          We do not have current labs for this patient (last 60 days). Patient needs one of the following for us to schedule the CT exam -GFR, Creatinine/Bun, BMP or CMP. Please submit labs so we can schedule the scan.                       This is for the requested CT deferred until 6/2/23.  Thank you

## 2022-12-08 ENCOUNTER — TELEPHONE (OUTPATIENT)
Dept: HEALTH INFORMATION MANAGEMENT | Facility: OTHER | Age: 77
End: 2022-12-08

## 2022-12-08 ENCOUNTER — HOSPITAL ENCOUNTER (OUTPATIENT)
Dept: LAB | Facility: MEDICAL CENTER | Age: 77
End: 2022-12-08
Attending: FAMILY MEDICINE
Payer: MEDICARE

## 2022-12-08 DIAGNOSIS — N28.1 ACQUIRED CYST OF KIDNEY: ICD-10-CM

## 2022-12-08 DIAGNOSIS — I10 PRIMARY HYPERTENSION: ICD-10-CM

## 2022-12-08 LAB
ALBUMIN SERPL BCP-MCNC: 4.6 G/DL (ref 3.2–4.9)
ALBUMIN/GLOB SERPL: 1.7 G/DL
ALP SERPL-CCNC: 47 U/L (ref 30–99)
ALT SERPL-CCNC: 80 U/L (ref 2–50)
ANION GAP SERPL CALC-SCNC: 14 MMOL/L (ref 7–16)
AST SERPL-CCNC: 56 U/L (ref 12–45)
BILIRUB SERPL-MCNC: 0.6 MG/DL (ref 0.1–1.5)
BUN SERPL-MCNC: 16 MG/DL (ref 8–22)
CALCIUM SERPL-MCNC: 10 MG/DL (ref 8.5–10.5)
CHLORIDE SERPL-SCNC: 98 MMOL/L (ref 96–112)
CO2 SERPL-SCNC: 24 MMOL/L (ref 20–33)
CREAT SERPL-MCNC: 0.87 MG/DL (ref 0.5–1.4)
GFR SERPLBLD CREATININE-BSD FMLA CKD-EPI: 90 ML/MIN/1.73 M 2
GLOBULIN SER CALC-MCNC: 2.7 G/DL (ref 1.9–3.5)
GLUCOSE SERPL-MCNC: 120 MG/DL (ref 65–99)
POTASSIUM SERPL-SCNC: 4 MMOL/L (ref 3.6–5.5)
PROT SERPL-MCNC: 7.3 G/DL (ref 6–8.2)
SODIUM SERPL-SCNC: 136 MMOL/L (ref 135–145)

## 2022-12-08 PROCEDURE — 80053 COMPREHEN METABOLIC PANEL: CPT

## 2022-12-08 PROCEDURE — 36415 COLL VENOUS BLD VENIPUNCTURE: CPT

## 2022-12-14 ENCOUNTER — HOSPITAL ENCOUNTER (OUTPATIENT)
Dept: RADIOLOGY | Facility: MEDICAL CENTER | Age: 77
End: 2022-12-14
Attending: FAMILY MEDICINE
Payer: MEDICARE

## 2022-12-14 DIAGNOSIS — Z13.6 ENCOUNTER FOR ABDOMINAL AORTIC ANEURYSM (AAA) SCREENING: ICD-10-CM

## 2022-12-14 PROCEDURE — 76775 US EXAM ABDO BACK WALL LIM: CPT

## 2022-12-15 NOTE — PROGRESS NOTES
New Patient Note    Interventional Pain and Spine  Physiatry (Physical Medicine and Rehabilitation)     Patient Name: Andrei Galeas  : 1945  Date of Service: 2022  PCP: Leo Villegas M.D.  Referring Provider: Leo Villegas*    Chief Complaint:   Chief Complaint   Patient presents with    New Patient     Foraminal stenosis of lumbar region       HPI  HISTORY (2022):  Andrei Galeas is a 76 y.o. male who presents today with pain radiating from his left knee down to his foot. Pain starts at his anterior knee radiating down to his mid medial calf. This started a few months ago with no known inciting event. Has pain limited weakness in his left leg. Denies tingling or numbness in his left leg.     Pain right now is 4/10 on the numeric pain scale. Pain doesn't change with activity/positioning. Improves with gabapentin. The patient otherwise denies new weakness, numbness, or bladder/bowel incontinence. He notes arthritis pain all over that prevents his ability to sleep.    Notes numbness in his hands. Has difficulty manipulating objects. When he sleeps he notices pain radiating from his right shoulder down to his hand. Would like to focus on lower extremity issues first, then address upper extremity issues.    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     The patient has not done physical therapy for this problem    Patient has tried the following medications with varied success (current meds in bold):   Gabapentin - relief with sleep and pain  OTC tylenol PRN - 2 pills daily  OTC ibuprofen PRN- 2 pills daily    Therapeutic modalities and interventional therapies to date include:  -no injections    Medical history includes HTN    Psychological testing for pain as depression and pain commonly coexist and need to both be treated.     Opioid Risk Score: 0      Interpretation of Opioid  Risk Score   Score 0-3 = Low risk of abuse. Do UDS at least once per year.  Score 4-7 = Moderate risk of abuse. Do UDS 1-4 times per year.  Score 8+ = High risk of abuse. Refer to specialist.    PHQ      3/28/2022     1:40 PM 2022     8:00 AM   Depression Screen (PHQ-2/PHQ-9)   PHQ-2 Total Score 4 3   PHQ-9 Total Score 13 8       Interpretation of PHQ-9 Total Score   Score Severity   1-4 No Depression   5-9 Mild Depression   10-14 Moderate Depression   15-19 Moderately Severe Depression   20-27 Severe Depression      Medical records review:  I reviewed the note from the referring provider Leo Villegas* including the note dated 2022.    ROS:   Red Flags ROS:   Fever, Chills, Sweats: Denies  Involuntary Weight Loss: Denies  Bladder Incontinence: Denies  Bowel Incontinence: denies  Saddle Anesthesia: Denies    All other systems reviewed and negative.     PMHx:   Past Medical History:   Diagnosis Date    Arthritis     Back pain     Cataract     Depression     Diabetes (HCC)     borderline    Hypertension     Obstructive chronic bronchitis without exacerbation (HCC) 2022    Shortness of breath     Sinusitis     Sleep apnea     Wheezing        PSHx:   Past Surgical History:   Procedure Laterality Date    SINUSOTOMIES         Family Hx:   Family History   Problem Relation Age of Onset    Heart Disease Paternal Uncle         MI    Arthritis Mother     Psychiatric Illness Father         stomach cancer    Hypertension Father     Hyperlipidemia Father     Stroke Father        Social Hx:  Social History     Socioeconomic History    Marital status:      Spouse name: Not on file    Number of children: Not on file    Years of education: Not on file    Highest education level: Not on file   Occupational History    Not on file   Tobacco Use    Smoking status: Former     Packs/day: 2.00     Years: 10.00     Pack years: 20.00     Types: Cigarettes     Quit date: 1977     Years since quittin.2     Smokeless tobacco: Never   Vaping Use    Vaping Use: Never used   Substance and Sexual Activity    Alcohol use: Yes     Comment: 1-2 drinks every week or two    Drug use: No    Sexual activity: Not on file   Other Topics Concern    Not on file   Social History Narrative    Not on file     Social Determinants of Health     Financial Resource Strain: Not on file   Food Insecurity: No Food Insecurity    Worried About Running Out of Food in the Last Year: Never true    Ran Out of Food in the Last Year: Never true   Transportation Needs: No Transportation Needs    Lack of Transportation (Medical): No    Lack of Transportation (Non-Medical): No   Physical Activity: Inactive    Days of Exercise per Week: 0 days    Minutes of Exercise per Session: 0 min   Stress: Stress Concern Present    Feeling of Stress : To some extent   Social Connections: Socially Integrated    Frequency of Communication with Friends and Family: Three times a week    Frequency of Social Gatherings with Friends and Family: Once a week    Attends Alevism Services: More than 4 times per year    Active Member of Clubs or Organizations: Yes    Attends Club or Organization Meetings: More than 4 times per year    Marital Status:    Intimate Partner Violence: Not on file   Housing Stability: Low Risk     Unable to Pay for Housing in the Last Year: No    Number of Places Lived in the Last Year: 1    Unstable Housing in the Last Year: No       Allergies:  Allergies   Allergen Reactions    Penicillins Unspecified     Childhood - unknown reaction       Medications: reviewed on epic.   Outpatient Medications Marked as Taking for the 12/16/22 encounter (Office Visit) with Claudia Cervantes M.D.   Medication Sig Dispense Refill    gabapentin (NEURONTIN) 100 MG Cap Take 1-2 tabs po at bedtime as needed for pain 60 Capsule 1    rosuvastatin (CRESTOR) 10 MG Tab Take 1 Tablet by mouth every day for 360 days. 90 Tablet 3    losartan-hydrochlorothiazide (HYZAAR)  100-25 MG per tablet Take 1 Tablet by mouth every day for 360 days. 90 Tablet 3    finasteride (PROSCAR) 5 MG Tab Take 1 Tablet by mouth every day for 360 days. 90 Tablet 3    CLINDAMYCIN PHOSPHATE,TOPICAL, 1 % Lotion clindamycin 1 % lotion   APPLY 1 APPLICATION TOPICALLY ONCE DAILY IN THE MORNING      ketoconazole (NIZORAL) 2 % Cream ketoconazole 2 % topical cream   APPLY CREAM TOPICALLY TO AFFECTED AREA ON THE SKIN TWICE DAILY      triamcinolone acetonide (KENALOG) 0.1 % Ointment APPLY OINTMENT TOPICALLY TO AFFECTED AREAS ON BODY, ARMS AND LEGS TWICE DAILY AS NEEDED      fexofenadine (ALLEGRA) 60 MG Tab Take 60 mg by mouth every day.      multivitamin (THERAGRAN) Tab Take 1 Tab by mouth every day.      fluticasone (FLONASE) 50 MCG/ACT nasal spray Spray 1 Spray in nose every day.          Current Outpatient Medications on File Prior to Visit   Medication Sig Dispense Refill    gabapentin (NEURONTIN) 100 MG Cap Take 1-2 tabs po at bedtime as needed for pain 60 Capsule 1    rosuvastatin (CRESTOR) 10 MG Tab Take 1 Tablet by mouth every day for 360 days. 90 Tablet 3    losartan-hydrochlorothiazide (HYZAAR) 100-25 MG per tablet Take 1 Tablet by mouth every day for 360 days. 90 Tablet 3    finasteride (PROSCAR) 5 MG Tab Take 1 Tablet by mouth every day for 360 days. 90 Tablet 3    CLINDAMYCIN PHOSPHATE,TOPICAL, 1 % Lotion clindamycin 1 % lotion   APPLY 1 APPLICATION TOPICALLY ONCE DAILY IN THE MORNING      ketoconazole (NIZORAL) 2 % Cream ketoconazole 2 % topical cream   APPLY CREAM TOPICALLY TO AFFECTED AREA ON THE SKIN TWICE DAILY      triamcinolone acetonide (KENALOG) 0.1 % Ointment APPLY OINTMENT TOPICALLY TO AFFECTED AREAS ON BODY, ARMS AND LEGS TWICE DAILY AS NEEDED      fexofenadine (ALLEGRA) 60 MG Tab Take 60 mg by mouth every day.      multivitamin (THERAGRAN) Tab Take 1 Tab by mouth every day.      fluticasone (FLONASE) 50 MCG/ACT nasal spray Spray 1 Spray in nose every day.       No current  "facility-administered medications on file prior to visit.         EXAMINATION     Physical Exam:   BP (!) 140/64 (BP Location: Right arm, Patient Position: Sitting, BP Cuff Size: Large adult)   Pulse 66   Temp 36.1 °C (96.9 °F) (Temporal)   Ht 1.803 m (5' 11\")   Wt (!) 129 kg (283 lb 11.7 oz)   SpO2 96%     Constitutional:   Body Habitus: Body mass index is 39.57 kg/m².  Cooperation: Fully cooperates with exam  Appearance: Well-groomed, well-nourished.    Eyes: No scleral icterus to suggest severe liver disease, no proptosis to suggest severe hyperthyroidism    ENT -no obvious auditory deficits, no noticeable facial droop     Skin -no rashes or lesions noted     Respiratory-  breathing comfortably on room air, no audible wheezing    Cardiovascular-distal extremities warm and well perfused.  No lower extremity edema is noted.     Gastrointestinal - no obvious abdominal masses, non-distended    Psychiatric- alert and oriented ×3. Normal affect.     Gait - normal gait, no use of ambulatory device, nonantalgic. Heel walking and toe walking intact.    Musculoskeletal and Neuro -     Cervical spine   Inspection: No deformities of the skin over the cervical spine. No rashes or lesions.    Spurling's sign  negative bilaterally      Key points for the international standards for neurological classification of spinal cord injury (ISNCSCI) to light touch.     Dermatome R L   C4 2 2   C5 2 2   C6 2 2   C7 2 2   C8 2 2   T1 2 2   T2 2 2       Motor Exam Upper Extremities   ? Myotome R L   Shoulder abduction C5 5 5   Elbow flexion C5 5 5   Wrist extension C6 5 5   Elbow extension C7 5 5   Finger flexion C8 5 5   Finger abduction T1 5 5     Reflexes  ?  R L   Biceps  2+ 2+   Brachioradialis  2+ 2+     March's sign negative bilaterally      Bilateral hands:   Inspection: No swelling, deformities, or rashes. Symmetric appearing thenar and hyperthenar regions bilaterally.  Palpation: no significant tenderness to palpation " throughout the bilateral hands  Range of motion is within normal limits throughout bilateral hands, fingers and wrist.    Special tests:  Tinel's at the wrist over the median nerve negative bilaterally  Carpal tunnel compression: negative bilaterally  Phalen's test: negative bilaterally  Tinel's at the cubital tunnel: negative bilaterally      Thoracic/Lumbar Spine/Sacral Spine/Hips   Inspection: No evidence of atrophy in bilateral lower extremities throughout   .   There is full active range of motion with lumbar extension    Facet loading maneuver negative bilaterally    Palpation:   Tenderness to palpation over the left posterolateral glute. No tenderness to palpation elsewhere in the low back/hips including midline of lumbosacral spine, paraspinal muscles bilaterally, lumbar facets bilaterally spanning approximately L1-L5 levels, sacroiliac joints bilaterally, PSIS bilaterally, and greater trochanters bilaterally.    Lumbar spine /hip provocative exam maneuvers  Straight leg raise negative bilaterally  Slump-sit test negative bilaterally  FADIR test negative bilaterally    SI joint tests  ASYA test negative bilaterally  Thigh thrust test negative bilaterally    Key points for the international standards for neurological classification of spinal cord injury (ISNCSCI) to light touch.   Dermatome R L   L2 2 2   L3 2 2   L4 2 2   L5 2 2   S1 2 2   S2 2 2       Motor Exam Lower Extremities  ? Myotome R L   Hip flexion L2 5 5   Knee extension L3 5 5   Ankle dorsiflexion L4 5 5   Toe extension L5 5 5   Ankle plantarflexion S1 5 5       Reflexes  ?  R L   Patella  2+ 2+   Achilles   2+ 2+     Clonus of the ankle negative bilaterally       MEDICAL DECISION MAKING    Medical records review: see under HPI section.     DATA    Labs: Personally reviewed at today's visit:     Lab Results   Component Value Date/Time    SODIUM 136 12/08/2022 10:57 AM    POTASSIUM 4.0 12/08/2022 10:57 AM    CHLORIDE 98 12/08/2022 10:57 AM     CO2 24 12/08/2022 10:57 AM    ANION 14.0 12/08/2022 10:57 AM    GLUCOSE 120 (H) 12/08/2022 10:57 AM    BUN 16 12/08/2022 10:57 AM    CREATININE 0.87 12/08/2022 10:57 AM    CALCIUM 10.0 12/08/2022 10:57 AM    ASTSGOT 56 (H) 12/08/2022 10:57 AM    ALTSGPT 80 (H) 12/08/2022 10:57 AM    TBILIRUBIN 0.6 12/08/2022 10:57 AM    ALBUMIN 4.6 12/08/2022 10:57 AM    TOTPROTEIN 7.3 12/08/2022 10:57 AM    GLOBULIN 2.7 12/08/2022 10:57 AM    AGRATIO 1.7 12/08/2022 10:57 AM       No results found for: PROTHROMBTM, INR     Lab Results   Component Value Date/Time    WBC 8.9 08/14/2016 10:50 AM    RBC 5.13 08/14/2016 10:50 AM    HEMOGLOBIN 15.7 08/14/2016 10:50 AM    HEMATOCRIT 45.6 08/14/2016 10:50 AM    MCV 88.9 08/14/2016 10:50 AM    MCH 30.5 08/14/2016 10:50 AM    MCHC 34.3 08/14/2016 10:50 AM    MPV 7.9 08/14/2016 10:50 AM    NEUTSPOLYS 54.6 12/14/2012 08:52 AM    LYMPHOCYTES 32.8 12/14/2012 08:52 AM    MONOCYTES 10.0 (H) 12/14/2012 08:52 AM    EOSINOPHILS 2.3 12/14/2012 08:52 AM    BASOPHILS 0.3 12/14/2012 08:52 AM        Lab Results   Component Value Date/Time    HBA1C 6.2 (H) 08/05/2022 10:03 AM        Imaging:   I personally reviewed following images, these are my reads  MRI lumbar spine 11/11/22  Disc bulge most notable at L2-3, L3-4, and L4-5. Severe left  and mild right neuroforaminal stenosis at L4-5. Mild right neuroforaminal stenosis at L5-S1. Moderate bilateral lateral recess narrowing narrowing and moderate right foraminal narrowing at L3-4. See formal radiology report for further details.      IMAGING radiology reads. I reviewed the following radiology reads                      Results for orders placed in visit on 11/11/22    MR-LUMBAR SPINE-W/O    Impression  Mild lumbar spine degenerative changes. Type I marrow changes are noted to the adjacent L3-L4 endplates.    There is moderate right-sided foraminal narrowing at the L3-4 level with impingement upon the exiting right L3 nerve    Severe left foraminal narrowing  at L4-5 with L4 nerve impingement.    Partially visualized is a mass involving the right renal upper pole. Recommend additional evaluation with a CT of the abdomen and pelvis with contrast, renal protocol.    These unexpected findings were communicated to the ordering provider by Epic inbasket message at 5:14 PM on 2022.       Diagnosis  Visit Diagnoses     ICD-10-CM   1. Left lumbar radiculitis  M54.16         ASSESSMENT AND PLAN:  Andrei Galeas ( 1945) is a male with left low back and left knee and medial calf pain that appears to have component of left lumbar radiculitis from severe left neuroforaminal stenosis at L4-5.     Andrei was seen today for new patient.    Diagnoses and all orders for this visit:    Left lumbar radiculitis  -     Referral to Pain Clinic        PLAN  Physical Therapy: pt's pain appears to be too severe to facilitate significant progress in PT. Pt would like to pursue injection first. Can consider PT later pending results of injection. Discussed that PT would be helpful to achieve long term pain relief. Pt declined PT referral today.     Diagnostic workup: Personally reviewed at today's visit: MRI lumbar spine 22    Medications:   - continue gabapentin, OTC tylenol, OTC ibuprofen    Interventions:   - interlaminar left L5-S1 epidural steroid injection. The risks, benefits, and alternatives to this procedure were discussed and the patient wishes to proceed with the procedure. Risks include but are not limited to damage to surrounding structures, infection, bleeding, worsening of pain which can be permanent, and weakness which can be permanent. Benefits include pain relief and improved function. Alternatives include not doing the procedure.    - hold NSAIDs for 5 days prior to procedure    Other  - discussed that the patient's UE numbness do not appear to follow a specific dermatomal pattern but the pain radiating down his right arm might be from cervical  radiculitis, and that further evaluation with EMG/NCS or cervical MRI could be considered. He elected to decline this at this time    Follow-up: 4 weeks after procedure    Orders Placed This Encounter    Referral to Pain Clinic       Claudia Cervantes MD  Interventional Pain and Spine  Physical Medicine and Rehabilitation  [facility]     Leo Villegas*     The above note documents my personal evaluation of this patient. In addition, I have reviewed and confirmed with the patient and MA the supportive information documented in today's Patient Health Questionnaire and Office Note.     Please note that this dictation was created using voice recognition software. I have made every reasonable attempt to correct obvious errors, but I expect that there are errors of grammar and possibly content that I did not discover before finalizing the note.

## 2022-12-15 NOTE — H&P (VIEW-ONLY)
New Patient Note    Interventional Pain and Spine  Physiatry (Physical Medicine and Rehabilitation)     Patient Name: Andrei Galeas  : 1945  Date of Service: 2022  PCP: Leo Villegas M.D.  Referring Provider: Leo Villegas*    Chief Complaint:   Chief Complaint   Patient presents with    New Patient     Foraminal stenosis of lumbar region       HPI  HISTORY (2022):  Andrei Galeas is a 76 y.o. male who presents today with pain radiating from his left knee down to his foot. Pain starts at his anterior knee radiating down to his mid medial calf. This started a few months ago with no known inciting event. Has pain limited weakness in his left leg. Denies tingling or numbness in his left leg.     Pain right now is 4/10 on the numeric pain scale. Pain doesn't change with activity/positioning. Improves with gabapentin. The patient otherwise denies new weakness, numbness, or bladder/bowel incontinence. He notes arthritis pain all over that prevents his ability to sleep.    Notes numbness in his hands. Has difficulty manipulating objects. When he sleeps he notices pain radiating from his right shoulder down to his hand. Would like to focus on lower extremity issues first, then address upper extremity issues.    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     The patient has not done physical therapy for this problem    Patient has tried the following medications with varied success (current meds in bold):   Gabapentin - relief with sleep and pain  OTC tylenol PRN - 2 pills daily  OTC ibuprofen PRN- 2 pills daily    Therapeutic modalities and interventional therapies to date include:  -no injections    Medical history includes HTN    Psychological testing for pain as depression and pain commonly coexist and need to both be treated.     Opioid Risk Score: 0      Interpretation of Opioid  Risk Score   Score 0-3 = Low risk of abuse. Do UDS at least once per year.  Score 4-7 = Moderate risk of abuse. Do UDS 1-4 times per year.  Score 8+ = High risk of abuse. Refer to specialist.    PHQ      3/28/2022     1:40 PM 2022     8:00 AM   Depression Screen (PHQ-2/PHQ-9)   PHQ-2 Total Score 4 3   PHQ-9 Total Score 13 8       Interpretation of PHQ-9 Total Score   Score Severity   1-4 No Depression   5-9 Mild Depression   10-14 Moderate Depression   15-19 Moderately Severe Depression   20-27 Severe Depression      Medical records review:  I reviewed the note from the referring provider Leo Villegas* including the note dated 2022.    ROS:   Red Flags ROS:   Fever, Chills, Sweats: Denies  Involuntary Weight Loss: Denies  Bladder Incontinence: Denies  Bowel Incontinence: denies  Saddle Anesthesia: Denies    All other systems reviewed and negative.     PMHx:   Past Medical History:   Diagnosis Date    Arthritis     Back pain     Cataract     Depression     Diabetes (HCC)     borderline    Hypertension     Obstructive chronic bronchitis without exacerbation (HCC) 2022    Shortness of breath     Sinusitis     Sleep apnea     Wheezing        PSHx:   Past Surgical History:   Procedure Laterality Date    SINUSOTOMIES         Family Hx:   Family History   Problem Relation Age of Onset    Heart Disease Paternal Uncle         MI    Arthritis Mother     Psychiatric Illness Father         stomach cancer    Hypertension Father     Hyperlipidemia Father     Stroke Father        Social Hx:  Social History     Socioeconomic History    Marital status:      Spouse name: Not on file    Number of children: Not on file    Years of education: Not on file    Highest education level: Not on file   Occupational History    Not on file   Tobacco Use    Smoking status: Former     Packs/day: 2.00     Years: 10.00     Pack years: 20.00     Types: Cigarettes     Quit date: 1977     Years since quittin.2     Smokeless tobacco: Never   Vaping Use    Vaping Use: Never used   Substance and Sexual Activity    Alcohol use: Yes     Comment: 1-2 drinks every week or two    Drug use: No    Sexual activity: Not on file   Other Topics Concern    Not on file   Social History Narrative    Not on file     Social Determinants of Health     Financial Resource Strain: Not on file   Food Insecurity: No Food Insecurity    Worried About Running Out of Food in the Last Year: Never true    Ran Out of Food in the Last Year: Never true   Transportation Needs: No Transportation Needs    Lack of Transportation (Medical): No    Lack of Transportation (Non-Medical): No   Physical Activity: Inactive    Days of Exercise per Week: 0 days    Minutes of Exercise per Session: 0 min   Stress: Stress Concern Present    Feeling of Stress : To some extent   Social Connections: Socially Integrated    Frequency of Communication with Friends and Family: Three times a week    Frequency of Social Gatherings with Friends and Family: Once a week    Attends Orthodox Services: More than 4 times per year    Active Member of Clubs or Organizations: Yes    Attends Club or Organization Meetings: More than 4 times per year    Marital Status:    Intimate Partner Violence: Not on file   Housing Stability: Low Risk     Unable to Pay for Housing in the Last Year: No    Number of Places Lived in the Last Year: 1    Unstable Housing in the Last Year: No       Allergies:  Allergies   Allergen Reactions    Penicillins Unspecified     Childhood - unknown reaction       Medications: reviewed on epic.   Outpatient Medications Marked as Taking for the 12/16/22 encounter (Office Visit) with Claudia Cervantes M.D.   Medication Sig Dispense Refill    gabapentin (NEURONTIN) 100 MG Cap Take 1-2 tabs po at bedtime as needed for pain 60 Capsule 1    rosuvastatin (CRESTOR) 10 MG Tab Take 1 Tablet by mouth every day for 360 days. 90 Tablet 3    losartan-hydrochlorothiazide (HYZAAR)  100-25 MG per tablet Take 1 Tablet by mouth every day for 360 days. 90 Tablet 3    finasteride (PROSCAR) 5 MG Tab Take 1 Tablet by mouth every day for 360 days. 90 Tablet 3    CLINDAMYCIN PHOSPHATE,TOPICAL, 1 % Lotion clindamycin 1 % lotion   APPLY 1 APPLICATION TOPICALLY ONCE DAILY IN THE MORNING      ketoconazole (NIZORAL) 2 % Cream ketoconazole 2 % topical cream   APPLY CREAM TOPICALLY TO AFFECTED AREA ON THE SKIN TWICE DAILY      triamcinolone acetonide (KENALOG) 0.1 % Ointment APPLY OINTMENT TOPICALLY TO AFFECTED AREAS ON BODY, ARMS AND LEGS TWICE DAILY AS NEEDED      fexofenadine (ALLEGRA) 60 MG Tab Take 60 mg by mouth every day.      multivitamin (THERAGRAN) Tab Take 1 Tab by mouth every day.      fluticasone (FLONASE) 50 MCG/ACT nasal spray Spray 1 Spray in nose every day.          Current Outpatient Medications on File Prior to Visit   Medication Sig Dispense Refill    gabapentin (NEURONTIN) 100 MG Cap Take 1-2 tabs po at bedtime as needed for pain 60 Capsule 1    rosuvastatin (CRESTOR) 10 MG Tab Take 1 Tablet by mouth every day for 360 days. 90 Tablet 3    losartan-hydrochlorothiazide (HYZAAR) 100-25 MG per tablet Take 1 Tablet by mouth every day for 360 days. 90 Tablet 3    finasteride (PROSCAR) 5 MG Tab Take 1 Tablet by mouth every day for 360 days. 90 Tablet 3    CLINDAMYCIN PHOSPHATE,TOPICAL, 1 % Lotion clindamycin 1 % lotion   APPLY 1 APPLICATION TOPICALLY ONCE DAILY IN THE MORNING      ketoconazole (NIZORAL) 2 % Cream ketoconazole 2 % topical cream   APPLY CREAM TOPICALLY TO AFFECTED AREA ON THE SKIN TWICE DAILY      triamcinolone acetonide (KENALOG) 0.1 % Ointment APPLY OINTMENT TOPICALLY TO AFFECTED AREAS ON BODY, ARMS AND LEGS TWICE DAILY AS NEEDED      fexofenadine (ALLEGRA) 60 MG Tab Take 60 mg by mouth every day.      multivitamin (THERAGRAN) Tab Take 1 Tab by mouth every day.      fluticasone (FLONASE) 50 MCG/ACT nasal spray Spray 1 Spray in nose every day.       No current  "facility-administered medications on file prior to visit.         EXAMINATION     Physical Exam:   BP (!) 140/64 (BP Location: Right arm, Patient Position: Sitting, BP Cuff Size: Large adult)   Pulse 66   Temp 36.1 °C (96.9 °F) (Temporal)   Ht 1.803 m (5' 11\")   Wt (!) 129 kg (283 lb 11.7 oz)   SpO2 96%     Constitutional:   Body Habitus: Body mass index is 39.57 kg/m².  Cooperation: Fully cooperates with exam  Appearance: Well-groomed, well-nourished.    Eyes: No scleral icterus to suggest severe liver disease, no proptosis to suggest severe hyperthyroidism    ENT -no obvious auditory deficits, no noticeable facial droop     Skin -no rashes or lesions noted     Respiratory-  breathing comfortably on room air, no audible wheezing    Cardiovascular-distal extremities warm and well perfused.  No lower extremity edema is noted.     Gastrointestinal - no obvious abdominal masses, non-distended    Psychiatric- alert and oriented ×3. Normal affect.     Gait - normal gait, no use of ambulatory device, nonantalgic. Heel walking and toe walking intact.    Musculoskeletal and Neuro -     Cervical spine   Inspection: No deformities of the skin over the cervical spine. No rashes or lesions.    Spurling's sign  negative bilaterally      Key points for the international standards for neurological classification of spinal cord injury (ISNCSCI) to light touch.     Dermatome R L   C4 2 2   C5 2 2   C6 2 2   C7 2 2   C8 2 2   T1 2 2   T2 2 2       Motor Exam Upper Extremities   ? Myotome R L   Shoulder abduction C5 5 5   Elbow flexion C5 5 5   Wrist extension C6 5 5   Elbow extension C7 5 5   Finger flexion C8 5 5   Finger abduction T1 5 5     Reflexes  ?  R L   Biceps  2+ 2+   Brachioradialis  2+ 2+     March's sign negative bilaterally      Bilateral hands:   Inspection: No swelling, deformities, or rashes. Symmetric appearing thenar and hyperthenar regions bilaterally.  Palpation: no significant tenderness to palpation " throughout the bilateral hands  Range of motion is within normal limits throughout bilateral hands, fingers and wrist.    Special tests:  Tinel's at the wrist over the median nerve negative bilaterally  Carpal tunnel compression: negative bilaterally  Phalen's test: negative bilaterally  Tinel's at the cubital tunnel: negative bilaterally      Thoracic/Lumbar Spine/Sacral Spine/Hips   Inspection: No evidence of atrophy in bilateral lower extremities throughout   .   There is full active range of motion with lumbar extension    Facet loading maneuver negative bilaterally    Palpation:   Tenderness to palpation over the left posterolateral glute. No tenderness to palpation elsewhere in the low back/hips including midline of lumbosacral spine, paraspinal muscles bilaterally, lumbar facets bilaterally spanning approximately L1-L5 levels, sacroiliac joints bilaterally, PSIS bilaterally, and greater trochanters bilaterally.    Lumbar spine /hip provocative exam maneuvers  Straight leg raise negative bilaterally  Slump-sit test negative bilaterally  FADIR test negative bilaterally    SI joint tests  ASYA test negative bilaterally  Thigh thrust test negative bilaterally    Key points for the international standards for neurological classification of spinal cord injury (ISNCSCI) to light touch.   Dermatome R L   L2 2 2   L3 2 2   L4 2 2   L5 2 2   S1 2 2   S2 2 2       Motor Exam Lower Extremities  ? Myotome R L   Hip flexion L2 5 5   Knee extension L3 5 5   Ankle dorsiflexion L4 5 5   Toe extension L5 5 5   Ankle plantarflexion S1 5 5       Reflexes  ?  R L   Patella  2+ 2+   Achilles   2+ 2+     Clonus of the ankle negative bilaterally       MEDICAL DECISION MAKING    Medical records review: see under HPI section.     DATA    Labs: Personally reviewed at today's visit:     Lab Results   Component Value Date/Time    SODIUM 136 12/08/2022 10:57 AM    POTASSIUM 4.0 12/08/2022 10:57 AM    CHLORIDE 98 12/08/2022 10:57 AM     CO2 24 12/08/2022 10:57 AM    ANION 14.0 12/08/2022 10:57 AM    GLUCOSE 120 (H) 12/08/2022 10:57 AM    BUN 16 12/08/2022 10:57 AM    CREATININE 0.87 12/08/2022 10:57 AM    CALCIUM 10.0 12/08/2022 10:57 AM    ASTSGOT 56 (H) 12/08/2022 10:57 AM    ALTSGPT 80 (H) 12/08/2022 10:57 AM    TBILIRUBIN 0.6 12/08/2022 10:57 AM    ALBUMIN 4.6 12/08/2022 10:57 AM    TOTPROTEIN 7.3 12/08/2022 10:57 AM    GLOBULIN 2.7 12/08/2022 10:57 AM    AGRATIO 1.7 12/08/2022 10:57 AM       No results found for: PROTHROMBTM, INR     Lab Results   Component Value Date/Time    WBC 8.9 08/14/2016 10:50 AM    RBC 5.13 08/14/2016 10:50 AM    HEMOGLOBIN 15.7 08/14/2016 10:50 AM    HEMATOCRIT 45.6 08/14/2016 10:50 AM    MCV 88.9 08/14/2016 10:50 AM    MCH 30.5 08/14/2016 10:50 AM    MCHC 34.3 08/14/2016 10:50 AM    MPV 7.9 08/14/2016 10:50 AM    NEUTSPOLYS 54.6 12/14/2012 08:52 AM    LYMPHOCYTES 32.8 12/14/2012 08:52 AM    MONOCYTES 10.0 (H) 12/14/2012 08:52 AM    EOSINOPHILS 2.3 12/14/2012 08:52 AM    BASOPHILS 0.3 12/14/2012 08:52 AM        Lab Results   Component Value Date/Time    HBA1C 6.2 (H) 08/05/2022 10:03 AM        Imaging:   I personally reviewed following images, these are my reads  MRI lumbar spine 11/11/22  Disc bulge most notable at L2-3, L3-4, and L4-5. Severe left  and mild right neuroforaminal stenosis at L4-5. Mild right neuroforaminal stenosis at L5-S1. Moderate bilateral lateral recess narrowing narrowing and moderate right foraminal narrowing at L3-4. See formal radiology report for further details.      IMAGING radiology reads. I reviewed the following radiology reads                      Results for orders placed in visit on 11/11/22    MR-LUMBAR SPINE-W/O    Impression  Mild lumbar spine degenerative changes. Type I marrow changes are noted to the adjacent L3-L4 endplates.    There is moderate right-sided foraminal narrowing at the L3-4 level with impingement upon the exiting right L3 nerve    Severe left foraminal narrowing  at L4-5 with L4 nerve impingement.    Partially visualized is a mass involving the right renal upper pole. Recommend additional evaluation with a CT of the abdomen and pelvis with contrast, renal protocol.    These unexpected findings were communicated to the ordering provider by Epic inbasket message at 5:14 PM on 2022.       Diagnosis  Visit Diagnoses     ICD-10-CM   1. Left lumbar radiculitis  M54.16         ASSESSMENT AND PLAN:  Andrei Galeas ( 1945) is a male with left low back and left knee and medial calf pain that appears to have component of left lumbar radiculitis from severe left neuroforaminal stenosis at L4-5.     Andrei was seen today for new patient.    Diagnoses and all orders for this visit:    Left lumbar radiculitis  -     Referral to Pain Clinic        PLAN  Physical Therapy: pt's pain appears to be too severe to facilitate significant progress in PT. Pt would like to pursue injection first. Can consider PT later pending results of injection. Discussed that PT would be helpful to achieve long term pain relief. Pt declined PT referral today.     Diagnostic workup: Personally reviewed at today's visit: MRI lumbar spine 22    Medications:   - continue gabapentin, OTC tylenol, OTC ibuprofen    Interventions:   - interlaminar left L5-S1 epidural steroid injection. The risks, benefits, and alternatives to this procedure were discussed and the patient wishes to proceed with the procedure. Risks include but are not limited to damage to surrounding structures, infection, bleeding, worsening of pain which can be permanent, and weakness which can be permanent. Benefits include pain relief and improved function. Alternatives include not doing the procedure.    - hold NSAIDs for 5 days prior to procedure    Other  - discussed that the patient's UE numbness do not appear to follow a specific dermatomal pattern but the pain radiating down his right arm might be from cervical  radiculitis, and that further evaluation with EMG/NCS or cervical MRI could be considered. He elected to decline this at this time    Follow-up: 4 weeks after procedure    Orders Placed This Encounter    Referral to Pain Clinic       Claudia Cervantes MD  Interventional Pain and Spine  Physical Medicine and Rehabilitation  [facility]     Leo Villegas*     The above note documents my personal evaluation of this patient. In addition, I have reviewed and confirmed with the patient and MA the supportive information documented in today's Patient Health Questionnaire and Office Note.     Please note that this dictation was created using voice recognition software. I have made every reasonable attempt to correct obvious errors, but I expect that there are errors of grammar and possibly content that I did not discover before finalizing the note.

## 2022-12-16 ENCOUNTER — OFFICE VISIT (OUTPATIENT)
Dept: PHYSICAL MEDICINE AND REHAB | Facility: MEDICAL CENTER | Age: 77
End: 2022-12-16
Payer: MEDICARE

## 2022-12-16 ENCOUNTER — OFFICE VISIT (OUTPATIENT)
Dept: MEDICAL GROUP | Facility: LAB | Age: 77
End: 2022-12-16
Payer: MEDICARE

## 2022-12-16 VITALS
HEART RATE: 79 BPM | WEIGHT: 280 LBS | DIASTOLIC BLOOD PRESSURE: 74 MMHG | RESPIRATION RATE: 15 BRPM | HEIGHT: 71 IN | TEMPERATURE: 97.4 F | BODY MASS INDEX: 39.2 KG/M2 | OXYGEN SATURATION: 94 % | SYSTOLIC BLOOD PRESSURE: 144 MMHG

## 2022-12-16 VITALS
SYSTOLIC BLOOD PRESSURE: 140 MMHG | HEART RATE: 66 BPM | OXYGEN SATURATION: 96 % | HEIGHT: 71 IN | WEIGHT: 283.73 LBS | TEMPERATURE: 96.9 F | BODY MASS INDEX: 39.72 KG/M2 | DIASTOLIC BLOOD PRESSURE: 64 MMHG

## 2022-12-16 DIAGNOSIS — M54.16 LEFT LUMBAR RADICULITIS: Primary | ICD-10-CM

## 2022-12-16 DIAGNOSIS — R74.8 ELEVATED LIVER ENZYMES: ICD-10-CM

## 2022-12-16 DIAGNOSIS — R73.9 ELEVATED BLOOD SUGAR: ICD-10-CM

## 2022-12-16 PROCEDURE — 99213 OFFICE O/P EST LOW 20 MIN: CPT | Performed by: FAMILY MEDICINE

## 2022-12-16 PROCEDURE — 99204 OFFICE O/P NEW MOD 45 MIN: CPT | Performed by: STUDENT IN AN ORGANIZED HEALTH CARE EDUCATION/TRAINING PROGRAM

## 2022-12-16 RX ORDER — TAMSULOSIN HYDROCHLORIDE 0.4 MG/1
CAPSULE ORAL
COMMUNITY
Start: 2022-12-10 | End: 2023-07-05

## 2022-12-16 ASSESSMENT — PAIN SCALES - GENERAL: PAINLEVEL: 4=SLIGHT-MODERATE PAIN

## 2022-12-16 ASSESSMENT — PATIENT HEALTH QUESTIONNAIRE - PHQ9
SUM OF ALL RESPONSES TO PHQ QUESTIONS 1-9: 8
CLINICAL INTERPRETATION OF PHQ2 SCORE: 3
5. POOR APPETITE OR OVEREATING: 1 - SEVERAL DAYS

## 2022-12-16 NOTE — PROGRESS NOTES
Subjective:   Andrei Galeas is a 76 y.o. male here today for   No chief complaint on file.      #Elevated liver enzymes:  -Patient to follow-up on lab work showing elevated liver enzymes.  He states he is feeling well.  Denies any abdominal pain, nausea, vomiting, changes in bowel movements, fevers, chills.  We did discuss his diet.  He states that diet is mostly good some however, consist of a lot of snacking, white bread, fruit juice regularly.  Patient is recovering from her knee injury and working on chronic back pain which has been limiting his physical activity.      Allergies   Allergen Reactions    Penicillins Unspecified     Childhood - unknown reaction         Current medicines (including changes today)  Current Outpatient Medications   Medication Sig Dispense Refill    tamsulosin (FLOMAX) 0.4 MG capsule TAKE 2 CAPSULES BY MOUTH ONCE DAILY AS DIRECTED AFTER A MEAL IN THE EVENING      gabapentin (NEURONTIN) 100 MG Cap Take 1-2 tabs po at bedtime as needed for pain 60 Capsule 1    rosuvastatin (CRESTOR) 10 MG Tab Take 1 Tablet by mouth every day for 360 days. 90 Tablet 3    losartan-hydrochlorothiazide (HYZAAR) 100-25 MG per tablet Take 1 Tablet by mouth every day for 360 days. 90 Tablet 3    finasteride (PROSCAR) 5 MG Tab Take 1 Tablet by mouth every day for 360 days. 90 Tablet 3    CLINDAMYCIN PHOSPHATE,TOPICAL, 1 % Lotion clindamycin 1 % lotion   APPLY 1 APPLICATION TOPICALLY ONCE DAILY IN THE MORNING      ketoconazole (NIZORAL) 2 % Cream ketoconazole 2 % topical cream   APPLY CREAM TOPICALLY TO AFFECTED AREA ON THE SKIN TWICE DAILY      triamcinolone acetonide (KENALOG) 0.1 % Ointment APPLY OINTMENT TOPICALLY TO AFFECTED AREAS ON BODY, ARMS AND LEGS TWICE DAILY AS NEEDED      fexofenadine (ALLEGRA) 60 MG Tab Take 60 mg by mouth every day.      multivitamin (THERAGRAN) Tab Take 1 Tab by mouth every day.      fluticasone (FLONASE) 50 MCG/ACT nasal spray Spray 1 Spray in nose every day.       No  "current facility-administered medications for this visit.     He  has a past medical history of Arthritis, Back pain, Cataract, Depression, Diabetes ([facility]), Hypertension, Obstructive chronic bronchitis without exacerbation ([facility]) (8/5/2022), Shortness of breath, Sinusitis, Sleep apnea, and Wheezing.    He has no past medical history of Anginal syndrome ([facility]), Arrhythmia, Asthma, Blood clotting disorder ([facility]), CAD (coronary artery disease), Cancer (HCC), Congestive heart failure (HCC), Cough, Dialysis patient ([facility]), Dilated cardiomyopathy (HCC), Disorder of thyroid, Fall, Glaucoma, Gynecological disorder, Heart murmur, Heart valve disease, Hemorrhagic disorder (HCC), History of - myocardial infarction, Indigestion, Infectious disease, Jaundice, Myocardial infarct ([facility]), Pacemaker, Painful breathing, Pneumonia, Renal disorder, Rheumatic fever, Seizure (HCC), Sputum production, Stroke (HCC), or Urinary incontinence.    ROS   -See HPI       Objective:     Physical Exam:  BP (!) 144/74   Pulse 79   Temp 36.3 °C (97.4 °F) (Temporal)   Resp 15   Ht 1.803 m (5' 10.98\")   Wt (!) 127 kg (280 lb)   SpO2 94%  Body mass index is 39.07 kg/m².   Constitutional: Alert, no distress.  Skin: Warm, dry, good turgor, no rashes in visible areas.  Eye: Equal, round and reactive, conjunctiva clear, lids normal.  Respiratory: Unlabored respiratory effort  Psych: Alert and oriented x3, normal affect and mood.    Labs:   Latest Reference Range & Units Most Recent   Sodium 135 - 145 mmol/L 136  12/8/22 10:57   Potassium 3.6 - 5.5 mmol/L 4.0  12/8/22 10:57   Chloride 96 - 112 mmol/L 98  12/8/22 10:57   Co2 20 - 33 mmol/L 24  12/8/22 10:57   Anion Gap 7.0 - 16.0  14.0  12/8/22 10:57   Glucose 65 - 99 mg/dL 120 (H)  12/8/22 10:57   Bun 8 - 22 mg/dL 16  12/8/22 10:57   Creatinine 0.50 - 1.40 mg/dL 0.87  12/8/22 10:57   GFR (CKD-EPI) >60 mL/min/1.73 m 2 90  12/8/22 10:57   Calcium 8.5 - 10.5 mg/dL 10.0  12/8/22 10:57   AST(SGOT) 12 - 45 U/L 56 " (H)  12/8/22 10:57   ALT(SGPT) 2 - 50 U/L 80 (H)  12/8/22 10:57   Alkaline Phosphatase 30 - 99 U/L 47  12/8/22 10:57   Total Bilirubin 0.1 - 1.5 mg/dL 0.6  12/8/22 10:57   Albumin 3.2 - 4.9 g/dL 4.6  12/8/22 10:57   Total Protein 6.0 - 8.2 g/dL 7.3  12/8/22 10:57   Globulin 1.9 - 3.5 g/dL 2.7  12/8/22 10:57   A-G Ratio g/dL 1.7  12/8/22 10:57   (H): Data is abnormally high  Assessment and Plan:     -Reviewed labs with patient, went over elevated AST and ALT.  Other labs are normal, normal liver function.  Discussed the importance of a low carbohydrate, low sugar diet with increasing both fiber and protein.  Discussed improving aerobic activity to the best of his ability given chronic back pain which he is working with physiatry to help.  Viewed diet again find sources of sugars and carbohydrates that we could work on eliminating.  We will start making these changes, follow-up in 3 months for repeat blood work.      1. Elevated liver enzymes      2. Elevated blood sugar        Followup: No follow-ups on file.         PLEASE NOTE: This dictation was created using voice recognition software. I have made every reasonable attempt to correct obvious errors, but I expect that there are errors of grammar and possibly content that I did not discover before finalizing the note.

## 2022-12-16 NOTE — PATIENT INSTRUCTIONS
Your procedure will be at the [facility] special procedure suite.    [facility] [place] [address]       PRE-PROCEDURE INSTRUCTIONS  You may take your regular medications except:   No Anti-inflammatories 5 days prior to your procedure. Anti-inflammatories include medicines such as  ibuprofen (Motrin, Advil), Excedrin, Naproxen (Aleve, Anaprox, Naprelan, Naprosyn), Celecoxib (Celebrex), Diclofenac (Voltaren-XR tab), and Meloxicam (Mobic).   You can take the remainder of your pain medications as prescribed.   If you are having a diagnostic procedure such as a medial branch block, do not use her pain meds on the day of the procedure  No Glucophage or Metformin 24 hours before your procedure. You may resume next day after your procedure.  Call the physiatry office if you are taking or prescribed anti-biotics within five days of procedure.  Please ask provider if you are taking any new diabetes medication.  CONTINUE TAKING BLOOD PRESSURE MEDICATIONS AS PRESCRIBED.  Pain medications will not be prescribed on the procedure day. Procedural pain medication may be used by your provider   Call your doctor's office performing the procedure if you have a fever, chills, rash or new illness prior to your procedure    Anticoagulation/antiplatelet medications  No Blood thinning medications such as Coumadin, Xarelto, aspirin or Plavix 5 days prior to procedure unless your doctor said to continue these medications. Call your doctor if a new medication is prescribed in this class.     Restrictions for eating before procedure:   If you are getting procedural sedation, then do not eat to for 8 hours prior to procedure appointment time. Do not drink fluids for four hours prior to your procedure time.   If you are not having procedural sedation, then Skip the meal prior to your procedure. If you have a morning procedure then skip breakfast. If you have an afternoon procedure then skip lunch.   You may drink clear liquids  up to 2 hours prior to your procedure  You must have a  the day of procedure to accompany you home.      POST PROCEDURE INSTRUCTIONS   No heavy lifting, strenuous bending or strenuous exercise for 3 days after your procedure.  No hot tubs, baths, swimming for 3 days after your procedure  You can remove the bandage the day after the procedure.  IF YOU RECEIVED A STEROID INJECTION. PLEASE NOTE THAT THERE MAY BE A DELAY FOR THE INJECTION TO START WORKING, THE DELAY MAY BE UP TO TWO WEEKS. IF YOU HAVE DIABETES, PLEASE NOTE THAT YOUR SUGAR LEVELS MAY BE ELEVATED FOR 1-2 DAYS AFTER A STEROID INJECTION.  THE STEROID MAY CAUSE TEMPORARY SYMPTOMS WHICH USUALLY RESOLVE ON THEIR OWN WITHIN 1 TO 2 DAYS INCLUDING FACIAL FLUSHING OR A FEELING OF WARMTH ON THE FACE, TEMPORARY INCREASES IN BLOOD SUGAR, INSOMNIA, INCREASED HUNGER  IF YOU EXPERIENCE PROLONGED WEAKNESS LONGER THAN ONE DAY, BOWEL OR BLADDER INCONTINENCE THEN PLEASE CALL THE PHYSIATRY OFFICE.  Your leg may feel heavy, weak and numb for up to 1-2 days. Be very careful walking.    You may resume normal activities 3 days after procedure.

## 2022-12-20 ENCOUNTER — HOSPITAL ENCOUNTER (OUTPATIENT)
Facility: REHABILITATION | Age: 77
End: 2022-12-20
Attending: STUDENT IN AN ORGANIZED HEALTH CARE EDUCATION/TRAINING PROGRAM | Admitting: STUDENT IN AN ORGANIZED HEALTH CARE EDUCATION/TRAINING PROGRAM
Payer: MEDICARE

## 2022-12-20 ENCOUNTER — APPOINTMENT (OUTPATIENT)
Dept: RADIOLOGY | Facility: REHABILITATION | Age: 77
End: 2022-12-20
Attending: STUDENT IN AN ORGANIZED HEALTH CARE EDUCATION/TRAINING PROGRAM
Payer: MEDICARE

## 2022-12-20 VITALS
BODY MASS INDEX: 38.89 KG/M2 | TEMPERATURE: 96.5 F | HEART RATE: 64 BPM | RESPIRATION RATE: 16 BRPM | HEIGHT: 71 IN | DIASTOLIC BLOOD PRESSURE: 73 MMHG | WEIGHT: 277.78 LBS | OXYGEN SATURATION: 96 % | SYSTOLIC BLOOD PRESSURE: 159 MMHG

## 2022-12-20 LAB — GLUCOSE BLD STRIP.AUTO-MCNC: 115 MG/DL (ref 65–99)

## 2022-12-20 PROCEDURE — 62323 NJX INTERLAMINAR LMBR/SAC: CPT

## 2022-12-20 PROCEDURE — 700111 HCHG RX REV CODE 636 W/ 250 OVERRIDE (IP)

## 2022-12-20 PROCEDURE — 82962 GLUCOSE BLOOD TEST: CPT

## 2022-12-20 PROCEDURE — 700117 HCHG RX CONTRAST REV CODE 255

## 2022-12-20 RX ORDER — LIDOCAINE HYDROCHLORIDE 10 MG/ML
INJECTION, SOLUTION EPIDURAL; INFILTRATION; INTRACAUDAL; PERINEURAL
Status: COMPLETED
Start: 2022-12-20 | End: 2022-12-20

## 2022-12-20 RX ORDER — METHYLPREDNISOLONE ACETATE 80 MG/ML
INJECTION, SUSPENSION INTRA-ARTICULAR; INTRALESIONAL; INTRAMUSCULAR; SOFT TISSUE
Status: COMPLETED
Start: 2022-12-20 | End: 2022-12-20

## 2022-12-20 RX ADMIN — LIDOCAINE HYDROCHLORIDE 10 ML: 10 INJECTION, SOLUTION EPIDURAL; INFILTRATION; INTRACAUDAL; PERINEURAL at 15:36

## 2022-12-20 RX ADMIN — IOHEXOL 10 ML: 240 INJECTION, SOLUTION INTRATHECAL; INTRAVASCULAR; INTRAVENOUS; ORAL at 15:38

## 2022-12-20 RX ADMIN — METHYLPREDNISOLONE ACETATE 80 MG: 80 INJECTION, SUSPENSION INTRA-ARTICULAR; INTRALESIONAL; INTRAMUSCULAR; SOFT TISSUE at 15:39

## 2022-12-20 ASSESSMENT — PAIN DESCRIPTION - PAIN TYPE
TYPE: CHRONIC PAIN
TYPE: CHRONIC PAIN

## 2022-12-20 NOTE — OP REPORT
Date of Service: 12/20/2022     Patient: Andrei Galeas 76 y.o. male     MRN: [id number]     Physician/s: Claudia Cervantes MD    Pre-operative Diagnosis: Lumbar radiculopathy    Post-operative Diagnosis: Lumbar radiculopathy    Procedure: Lumbar Epidural Steroid Injection at the left L5-S1 level.     Description of procedure:    The risks, benefits, and alternatives of the procedure were reviewed and discussed with the patient.  Written informed consent was freely obtained. A pre-procedural time-out was conducted by the physician verifying patient’s identity, procedure to be performed, procedure site and side, and allergy verification. Appropriate equipment was determined to be in place for the procedure.     The patient's vital signs were carefully monitored before, throughout, and after the procedure.     The patient was placed in the prone position, and fluoroscopy was used to identify the left L5-S1 level.  The lumbar area was prepped with chlorhexidine solution and draped with sterile drape.  Sterile technique was used throughout.  At the needle entry point, the skin and subcutaneous tissues were infiltrated with 1% lidocaine.  A 20-gauge Tuohy needle was advanced to the epidural space, using the loss of resistance technique in a contralateral oblique fluoroscopic view. In the AP and lateral views, contrast dye was used to highlight the epidural space spread while the fluoroscope was running live. With the needle in the epidural space, aspiration was negative for blood or other fluid.  Methylprednisolone, 80 mg., lidocaine, 10 mg, and 1.5cc of 0.9% normal saline (total volume 3.5 ml.) were injected through the Tuohy needle.  The injected local anesthetic and steroid resulted in dispersion of the previously injected contrast. The needle was removed intact. The patient's back was covered with a 4x4 gauze, the area was cleansed with sterile normal saline, and a dressing was applied. There were no complications  noted.     The procedure was well tolerated, and there were no apparent complications.      The patient was then evaluated post-procedure, and was hemodynamically stable prior to leaving the post-operative care unit.     Follow-up as scheduled    Claudia Cervantes MD  Interventional Pain and Spine  Physical Medicine and Rehabilitation  [facility]      CPT codes  Interlaminar epidural injection - lumbar or sacral (caudal): 79495

## 2022-12-20 NOTE — INTERVAL H&P NOTE
H&P reviewed. The patient was examined and there are no changes to the H&P    Claudia Cervantes MD  Interventional Pain and Spine  Physical Medicine and Rehabilitation  [facility]

## 2022-12-20 NOTE — PROGRESS NOTES
1508 Pt admitted to Pre Procedure area.  Consent signed and post op instructions reviewed with pt, all questions answered. Blood sugar taken by RN at 1519, results 115.  1511 Dr. Cervantes in to see pt.    1544 Pt received to Post Procedure area with updates from procedure RN.  Snack and drink tolerated without C/O N/V.  Dressing clear, dry, no swelling noted.    1548 Pt seen by Dr. Cervantes for post procedure evaluation.     1559  Pt ambulated without difficulty & meets criteria for DC, accompanied to car and placed in passenger seat.  Discharged to designated .

## 2022-12-29 ENCOUNTER — TELEPHONE (OUTPATIENT)
Dept: PHYSICAL MEDICINE AND REHAB | Facility: MEDICAL CENTER | Age: 77
End: 2022-12-29
Payer: MEDICARE

## 2022-12-29 NOTE — TELEPHONE ENCOUNTER
Called for post-sp check-up. Pt reported the following regarding the procedure site: Lumbar L5-S1 interlaminar epidural    Change in pain?: feels better    Concerns?: none    Confirmed [facility] appt?: yes, 1/20/23

## 2023-01-25 PROBLEM — I70.0 ATHEROSCLEROSIS OF AORTA (HCC): Status: ACTIVE | Noted: 2023-01-25

## 2023-01-25 PROBLEM — R73.03 PREDIABETES: Status: ACTIVE | Noted: 2023-01-25

## 2023-01-25 PROBLEM — I72.3 ANEURYSM OF RIGHT COMMON ILIAC ARTERY (HCC): Status: ACTIVE | Noted: 2023-01-25

## 2023-01-25 PROBLEM — G47.00 INSOMNIA: Status: ACTIVE | Noted: 2023-01-25

## 2023-01-25 PROBLEM — N40.1 LOWER URINARY TRACT SYMPTOMS DUE TO BENIGN PROSTATIC HYPERPLASIA: Status: ACTIVE | Noted: 2023-01-25

## 2023-01-27 ENCOUNTER — OFFICE VISIT (OUTPATIENT)
Dept: PHYSICAL MEDICINE AND REHAB | Facility: MEDICAL CENTER | Age: 78
End: 2023-01-27
Payer: MEDICARE

## 2023-01-27 VITALS
HEIGHT: 70 IN | DIASTOLIC BLOOD PRESSURE: 54 MMHG | TEMPERATURE: 97.1 F | OXYGEN SATURATION: 94 % | SYSTOLIC BLOOD PRESSURE: 126 MMHG | BODY MASS INDEX: 40.75 KG/M2 | HEART RATE: 81 BPM | WEIGHT: 284.61 LBS

## 2023-01-27 DIAGNOSIS — M54.16 LEFT LUMBAR RADICULITIS: ICD-10-CM

## 2023-01-27 DIAGNOSIS — M25.562 CHRONIC PAIN OF LEFT KNEE: Primary | ICD-10-CM

## 2023-01-27 DIAGNOSIS — G89.29 CHRONIC PAIN OF LEFT KNEE: Primary | ICD-10-CM

## 2023-01-27 DIAGNOSIS — M17.12 PRIMARY OSTEOARTHRITIS OF LEFT KNEE: ICD-10-CM

## 2023-01-27 PROCEDURE — 99214 OFFICE O/P EST MOD 30 MIN: CPT | Performed by: STUDENT IN AN ORGANIZED HEALTH CARE EDUCATION/TRAINING PROGRAM

## 2023-01-27 ASSESSMENT — PAIN SCALES - GENERAL: PAINLEVEL: 7=MODERATE-SEVERE PAIN

## 2023-01-27 ASSESSMENT — PATIENT HEALTH QUESTIONNAIRE - PHQ9
CLINICAL INTERPRETATION OF PHQ2 SCORE: 1
SUM OF ALL RESPONSES TO PHQ QUESTIONS 1-9: 6
5. POOR APPETITE OR OVEREATING: 2 - MORE THAN HALF THE DAYS

## 2023-01-27 NOTE — PROGRESS NOTES
Follow-up patient Note    Interventional Pain and Spine  Physiatry (Physical Medicine and Rehabilitation)     Patient Name: Andrei Galeas  : 1945  Date of service: 2023    Chief Complaint:   Chief Complaint   Patient presents with    Follow-Up     Post SP        HISTORY  Please see new patient note by Dr. Cervantes for more details.     HPI  Today's visit   Andrei Galeas ( 1945) is a male with The primary encounter diagnosis was Chronic pain of left knee. Diagnoses of Left lumbar radiculitis and Primary osteoarthritis of left knee were also pertinent to this visit.    Presents today after 22 Lumbar Epidural Steroid Injection at the left L5-S1 level. Worst pain is focal at his left knee, medial aspect.  Does not radiate. No numbness or tingling. Still having some pain limited weakness    Taking tylenol for pain with unsure relief. Takes gabapentin 100mg QHS with unsure relief and no known unwanted SE.     Notes that he is taking nyquil at bedtime for general increased phlegm.     Pain severity 7/10 currently  Pt denies new numbness, tingling, or weakness.    Procedure history:  - 22 Lumbar Epidural Steroid Injection at the left L5-S1 level - 100% pain relief x 1 month, then started wearing off 1 week ago, now 20-30% better       ROS:   Red Flags ROS:   Fever, Chills, Sweats: Denies  Involuntary Weight Loss: Denies  Bladder Incontinence: Denies  Bowel Incontinence: denies  Saddle Anesthesia: Denies    All other systems reviewed and negative.     PMHx:   Past Medical History:   Diagnosis Date    Arthritis     Back pain     Cataract     Depression     Diabetes (HCC)     borderline    Hypertension     Obstructive chronic bronchitis without exacerbation ([facility]) 2022    Shortness of breath     Sinusitis     Sleep apnea     Wheezing        PSHx:   Past Surgical History:   Procedure Laterality Date    OK INJ LUMBAR/SACRAL,W/ IMAGING Left 2022    Procedure: LEFT lumbar  L5-S1 interlaminar epidural steroid injection.;  Surgeon: Claudia Cervantes M.D.;  [facility];  Service: Pain Management    SINUSOTOMIES         Family Hx:   Family History   Problem Relation Age of Onset    Heart Disease Paternal Uncle         MI    Arthritis Mother     Psychiatric Illness Father         stomach cancer    Hypertension Father     Hyperlipidemia Father     Stroke Father        Social Hx:  Social History     Socioeconomic History    Marital status:      Spouse name: Not on file    Number of children: Not on file    Years of education: Not on file    Highest education level: Not on file   Occupational History    Not on file   Tobacco Use    Smoking status: Former     Packs/day: 2.00     Years: 10.00     Pack years: 20.00     Types: Cigarettes     Quit date: 1977     Years since quittin.4    Smokeless tobacco: Never   Vaping Use    Vaping Use: Never used   Substance and Sexual Activity    Alcohol use: Yes     Comment: 1-2 drinks every week or two    Drug use: No    Sexual activity: Not on file   Other Topics Concern    Not on file   Social History Narrative    Not on file     Social Determinants of Health     Financial Resource Strain: Not on file   Food Insecurity: No Food Insecurity    Worried About Running Out of Food in the Last Year: Never true    Ran Out of Food in the Last Year: Never true   Transportation Needs: No Transportation Needs    Lack of Transportation (Medical): No    Lack of Transportation (Non-Medical): No   Physical Activity: Inactive    Days of Exercise per Week: 0 days    Minutes of Exercise per Session: 0 min   Stress: Stress Concern Present    Feeling of Stress : To some extent   Social Connections: Socially Integrated    Frequency of Communication with Friends and Family: Three times a week    Frequency of Social Gatherings with Friends and Family: Once a week    Attends Sikhism Services: More than 4 times per year    Active Member  of Clubs or Organizations: Yes    Attends Club or Organization Meetings: More than 4 times per year    Marital Status:    Intimate Partner Violence: Not on file   Housing Stability: Low Risk     Unable to Pay for Housing in the Last Year: No    Number of Places Lived in the Last Year: 1    Unstable Housing in the Last Year: No       Allergies:  Allergies   Allergen Reactions    Penicillins Unspecified     Childhood - unknown reaction       Medications: reviewed on epic.   Outpatient Medications Marked as Taking for the 1/27/23 encounter (Office Visit) with Claudia Cervantes M.D.   Medication Sig Dispense Refill    diphenhydrAMINE-APAP, sleep, (TYLENOL PM EXTRA STRENGTH)  MG Tab Take 1 Tablet by mouth at bedtime as needed.      Potassium 99 MG Tab Take 0.5 Tablets by mouth every day.      tamsulosin (FLOMAX) 0.4 MG capsule TAKE 2 CAPSULES BY MOUTH ONCE DAILY AS DIRECTED AFTER A MEAL IN THE EVENING      gabapentin (NEURONTIN) 100 MG Cap Take 1-2 tabs po at bedtime as needed for pain 60 Capsule 1    rosuvastatin (CRESTOR) 10 MG Tab Take 1 Tablet by mouth every day for 360 days. 90 Tablet 3    losartan-hydrochlorothiazide (HYZAAR) 100-25 MG per tablet Take 1 Tablet by mouth every day for 360 days. 90 Tablet 3    finasteride (PROSCAR) 5 MG Tab Take 1 Tablet by mouth every day for 360 days. 90 Tablet 3    ketoconazole (NIZORAL) 2 % Cream ketoconazole 2 % topical cream   APPLY CREAM TOPICALLY TO AFFECTED AREA ON THE SKIN TWICE DAILY      fexofenadine (ALLEGRA) 60 MG Tab Take 60 mg by mouth every day.      multivitamin (THERAGRAN) Tab Take 1 Tab by mouth every day.      fluticasone (FLONASE) 50 MCG/ACT nasal spray Spray 1 Spray in nose every day.          Current Outpatient Medications on File Prior to Visit   Medication Sig Dispense Refill    diphenhydrAMINE-APAP, sleep, (TYLENOL PM EXTRA STRENGTH)  MG Tab Take 1 Tablet by mouth at bedtime as needed.      Potassium 99 MG Tab Take 0.5 Tablets by mouth every  "day.      tamsulosin (FLOMAX) 0.4 MG capsule TAKE 2 CAPSULES BY MOUTH ONCE DAILY AS DIRECTED AFTER A MEAL IN THE EVENING      gabapentin (NEURONTIN) 100 MG Cap Take 1-2 tabs po at bedtime as needed for pain 60 Capsule 1    rosuvastatin (CRESTOR) 10 MG Tab Take 1 Tablet by mouth every day for 360 days. 90 Tablet 3    losartan-hydrochlorothiazide (HYZAAR) 100-25 MG per tablet Take 1 Tablet by mouth every day for 360 days. 90 Tablet 3    finasteride (PROSCAR) 5 MG Tab Take 1 Tablet by mouth every day for 360 days. 90 Tablet 3    ketoconazole (NIZORAL) 2 % Cream ketoconazole 2 % topical cream   APPLY CREAM TOPICALLY TO AFFECTED AREA ON THE SKIN TWICE DAILY      fexofenadine (ALLEGRA) 60 MG Tab Take 60 mg by mouth every day.      multivitamin (THERAGRAN) Tab Take 1 Tab by mouth every day.      fluticasone (FLONASE) 50 MCG/ACT nasal spray Spray 1 Spray in nose every day.       No current facility-administered medications on file prior to visit.         EXAMINATION     Physical Exam:   /54 (BP Location: Right arm, Patient Position: Sitting, BP Cuff Size: Large adult long)   Pulse 81   Temp 36.2 °C (97.1 °F) (Temporal)   Ht 1.778 m (5' 10\")   Wt (!) 129 kg (284 lb 9.8 oz)   SpO2 94%     Constitutional:   Body Habitus: Body mass index is 40.84 kg/m².  Cooperation: Fully cooperates with exam  Appearance: Well-groomed, well-nourished.    Eyes: No scleral icterus to suggest severe liver disease, no proptosis to suggest severe hyperthyroidism    ENT -no obvious auditory deficits, no noticeable facial droop     Skin -no rashes or lesions noted     Respiratory-  breathing comfortably on room air, no audible wheezing    Cardiovascular-distal extremities warm and well perfused.  No lower extremity edema is noted.     Gastrointestinal - no obvious abdominal masses, non-distended    Psychiatric- alert and oriented ×3. Normal affect.     Gait - normal gait, no use of ambulatory device, nonantalgic. Heel walking and toe " walking intact.     Musculoskeletal and Neuro -       Thoracic/Lumbar Spine/Sacral Spine/Hips   Focal tenderness to palpation at left knee medial joint line and supra patellar facet.  Full active range of motion with knee flexion and extension.  No tenderness to palpation elsewhere in left knee.    Inspection: No evidence of atrophy in bilateral lower extremities throughout   .   Lumbar spine /hip provocative exam maneuvers  Straight leg raise negative bilaterally  FADIR test negative bilaterally     SI joint tests  ASYA test negative bilaterally  Thigh thrust test negative bilaterally     Key points for the international standards for neurological classification of spinal cord injury (ISNCSCI) to light touch.   Dermatome R L   L2 2 2   L3 2 2   L4 2 2   L5 2 2   S1 2 2   S2 2 2         Motor Exam Lower Extremities  ? Myotome R L   Hip flexion L2 5 5   Knee extension L3 5 5   Ankle dorsiflexion L4 5 5   Toe extension L5 5 5   Ankle plantarflexion S1 5 5      Previous exam  Cervical spine   Inspection: No deformities of the skin over the cervical spine. No rashes or lesions.     Spurling's sign  negative bilaterally        Key points for the international standards for neurological classification of spinal cord injury (ISNCSCI) to light touch.      Dermatome R L   C4 2 2   C5 2 2   C6 2 2   C7 2 2   C8 2 2   T1 2 2   T2 2 2         Motor Exam Upper Extremities   ? Myotome R L   Shoulder abduction C5 5 5   Elbow flexion C5 5 5   Wrist extension C6 5 5   Elbow extension C7 5 5   Finger flexion C8 5 5   Finger abduction T1 5 5      Reflexes  ?   R L   Biceps   2+ 2+   Brachioradialis   2+ 2+      March's sign negative bilaterally       Bilateral hands:   Inspection: No swelling, deformities, or rashes. Symmetric appearing thenar and hyperthenar regions bilaterally.  Palpation: no significant tenderness to palpation throughout the bilateral hands  Range of motion is within normal limits throughout bilateral hands,  fingers and wrist.     Special tests:  Tinel's at the wrist over the median nerve negative bilaterally  Carpal tunnel compression: negative bilaterally  Phalen's test: negative bilaterally  Tinel's at the cubital tunnel: negative bilaterally    Slump-sit test negative bilaterally      Facet loading maneuver negative bilaterally     Palpation:   Tenderness to palpation over the left posterolateral glute. No tenderness to palpation elsewhere in the low back/hips including midline of lumbosacral spine, paraspinal muscles bilaterally, lumbar facets bilaterally spanning approximately L1-L5 levels, sacroiliac joints bilaterally, PSIS bilaterally, and greater trochanters bilaterally.       Reflexes  ?   R L   Patella   2+ 2+   Achilles    2+ 2+      Clonus of the ankle negative bilaterally          MEDICAL DECISION MAKING    Medical records review: see under HPI section.     DATA    Labs: No new labs available for review since last visit.   Lab Results   Component Value Date/Time    SODIUM 136 12/08/2022 10:57 AM    POTASSIUM 4.0 12/08/2022 10:57 AM    CHLORIDE 98 12/08/2022 10:57 AM    CO2 24 12/08/2022 10:57 AM    ANION 14.0 12/08/2022 10:57 AM    GLUCOSE 120 (H) 12/08/2022 10:57 AM    BUN 16 12/08/2022 10:57 AM    CREATININE 0.87 12/08/2022 10:57 AM    CALCIUM 10.0 12/08/2022 10:57 AM    ASTSGOT 56 (H) 12/08/2022 10:57 AM    ALTSGPT 80 (H) 12/08/2022 10:57 AM    TBILIRUBIN 0.6 12/08/2022 10:57 AM    ALBUMIN 4.6 12/08/2022 10:57 AM    TOTPROTEIN 7.3 12/08/2022 10:57 AM    GLOBULIN 2.7 12/08/2022 10:57 AM    AGRATIO 1.7 12/08/2022 10:57 AM       No results found for: PROTHROMBTM, INR     Lab Results   Component Value Date/Time    WBC 8.9 08/14/2016 10:50 AM    RBC 5.13 08/14/2016 10:50 AM    HEMOGLOBIN 15.7 08/14/2016 10:50 AM    HEMATOCRIT 45.6 08/14/2016 10:50 AM    MCV 88.9 08/14/2016 10:50 AM    MCH 30.5 08/14/2016 10:50 AM    MCHC 34.3 08/14/2016 10:50 AM    MPV 7.9 08/14/2016 10:50 AM    NEUTSPOLYS 54.6 12/14/2012  08:52 AM    LYMPHOCYTES 32.8 12/14/2012 08:52 AM    MONOCYTES 10.0 (H) 12/14/2012 08:52 AM    EOSINOPHILS 2.3 12/14/2012 08:52 AM    BASOPHILS 0.3 12/14/2012 08:52 AM        Lab Results   Component Value Date/Time    HBA1C 6.2 (H) 08/05/2022 10:03 AM        Imaging:   I personally reviewed following images, these are my reads  MRI lumbar spine 11/11/22  Disc bulge most notable at L2-3, L3-4, and L4-5. Severe left  and mild right neuroforaminal stenosis at L4-5. Mild right neuroforaminal stenosis at L5-S1. Moderate bilateral lateral recess narrowing narrowing and moderate right foraminal narrowing at L3-4. See formal radiology report for further details.    X-ray left knee 11/26/2022  Patella fermin.  Quadriceps tendon enthesophyte.  Mild osteoarthritis at medial compartment.     IMAGING radiology reads. I reviewed the following radiology reads                      Results for orders placed in visit on 11/11/22     MR-LUMBAR SPINE-W/O     Impression  Mild lumbar spine degenerative changes. Type I marrow changes are noted to the adjacent L3-L4 endplates.     There is moderate right-sided foraminal narrowing at the L3-4 level with impingement upon the exiting right L3 nerve     Severe left foraminal narrowing at L4-5 with L4 nerve impingement.     Partially visualized is a mass involving the right renal upper pole. Recommend additional evaluation with a CT of the abdomen and pelvis with contrast, renal protocol.     These unexpected findings were communicated to the ordering provider by Epic inbasket message at 5:14 PM on 11/11/2022.               X-ray left knee 11/26/2022  FINDINGS:  No acute fracture or dislocation.     Mild osteoarthritis     Small knee joint effusion.     IMPRESSION:        1. No acute osseous abnormality.    Diagnosis  Visit Diagnoses     ICD-10-CM   1. Chronic pain of left knee  M25.562    G89.29   2. Left lumbar radiculitis  M54.16   3. Primary osteoarthritis of left knee  M17.12          ASSESSMENT AND PLAN:  Andrei Galeas (: 1945) is a male with left low back and left knee and medial calf pain that appears to have component of left lumbar radiculitis from severe left neuroforaminal stenosis at L4-5.     Andrei was seen today for follow-up.    Diagnoses and all orders for this visit:    Chronic pain of left knee  -     DX-KNEE COMPLETE 4+ LEFT    Left lumbar radiculitis    Primary osteoarthritis of left knee          PLAN  Physical Therapy: pt's pain appears to be too severe to facilitate significant progress in PT. Pt would like to pursue injection first. Can consider PT later pending results of injection. Discussed that PT would be helpful to achieve long term pain relief. Pt declined PT referral.      Diagnostic workup:   XR left knee ordered including sunrise view which is not obtained at his previous x-ray left knee. Suspect suprapatellar arthritis.   Personally reviewed at today's visit: X-ray left knee 2022     Medications:   - continue gabapentin, OTC tylenol, OTC ibuprofen     Interventions:   - interlaminar left L5-S1 epidural steroid injection PRN  -Discussed that my impression of his pain at this time is focal left knee pain likely secondary to osteoarthritis, given the nonradiating nature of his pain that makes it less likely to be from a nerve in the low back.  Discussed possible left knee joint steroid injection especially if his degree of arthritis is at least moderate on updated imaging. The risks, benefits, and alternatives to this procedure were discussed and the patient wishes to proceed with the procedure if it is indicated this my review of his updated knee x-ray. Risks include but are not limited to damage to surrounding structures, infection, bleeding, worsening of pain which can be permanent, and weakness which can be permanent. Benefits include pain relief and improved function. Alternatives include not doing the procedure.      Other  -I  have discussed that the patient's UE numbness do not appear to follow a specific dermatomal pattern but the pain radiating down his right arm might be from cervical radiculitis, and that further evaluation with EMG/NCS or cervical MRI could be considered. He elected to decline this at this time    Follow-up: We will contact patient after reviewing his x-ray regarding next steps.  He would like to proceed with injection at the next visit if it is indicated based on my review of his imaging    Orders Placed This Encounter    DX-KNEE COMPLETE 4+ LEFT       Claudia Cervantes MD  Interventional Pain and Spine  Physical Medicine and Rehabilitation  [facility]      The above note documents my personal evaluation of this patient. In addition, I have reviewed and confirmed with the patient and MA the supportive information documented in today's Patient Health Questionnaire and Office Note.     Please note that this dictation was created using voice recognition software. I have made every reasonable attempt to correct obvious errors, but I expect that there are errors of grammar and possibly content that I did not discover before finalizing the note.

## 2023-01-30 ENCOUNTER — APPOINTMENT (OUTPATIENT)
Dept: RADIOLOGY | Facility: MEDICAL CENTER | Age: 78
End: 2023-01-30
Attending: STUDENT IN AN ORGANIZED HEALTH CARE EDUCATION/TRAINING PROGRAM
Payer: MEDICARE

## 2023-01-30 PROCEDURE — 73564 X-RAY EXAM KNEE 4 OR MORE: CPT | Mod: LT

## 2023-01-31 DIAGNOSIS — M25.562 CHRONIC PAIN OF LEFT KNEE: ICD-10-CM

## 2023-01-31 DIAGNOSIS — M17.12 PRIMARY OSTEOARTHRITIS OF LEFT KNEE: Primary | ICD-10-CM

## 2023-01-31 DIAGNOSIS — G89.29 CHRONIC PAIN OF LEFT KNEE: ICD-10-CM

## 2023-01-31 NOTE — RESULT ENCOUNTER NOTE
Please call the patient and inform him that his X-ray showed mild-moderate osteoarthritis and I think he may benefit from the knee steroid injection that he and I discussed. He wished to proceed with an injection when I last saw him.     Please ask him if he would still like to proceed with the injection. If so, please let me know and schedule him for f/u anytime for the injection. If he isn't interested in an injection, ok to schedule f/u for imaging review.

## 2023-02-13 ENCOUNTER — OFFICE VISIT (OUTPATIENT)
Dept: PHYSICAL MEDICINE AND REHAB | Facility: MEDICAL CENTER | Age: 78
End: 2023-02-13
Payer: MEDICARE

## 2023-02-13 VITALS
HEIGHT: 70 IN | HEART RATE: 66 BPM | DIASTOLIC BLOOD PRESSURE: 60 MMHG | SYSTOLIC BLOOD PRESSURE: 140 MMHG | WEIGHT: 281.97 LBS | OXYGEN SATURATION: 96 % | TEMPERATURE: 97.1 F | BODY MASS INDEX: 40.37 KG/M2

## 2023-02-13 DIAGNOSIS — M17.12 PRIMARY OSTEOARTHRITIS OF LEFT KNEE: ICD-10-CM

## 2023-02-13 DIAGNOSIS — G89.29 CHRONIC PAIN OF LEFT KNEE: Primary | ICD-10-CM

## 2023-02-13 DIAGNOSIS — M25.562 CHRONIC PAIN OF LEFT KNEE: Primary | ICD-10-CM

## 2023-02-13 PROCEDURE — 20611 DRAIN/INJ JOINT/BURSA W/US: CPT | Mod: LT | Performed by: STUDENT IN AN ORGANIZED HEALTH CARE EDUCATION/TRAINING PROGRAM

## 2023-02-13 RX ORDER — DEXAMETHASONE SODIUM PHOSPHATE 4 MG/ML
4 INJECTION, SOLUTION INTRA-ARTICULAR; INTRALESIONAL; INTRAMUSCULAR; INTRAVENOUS; SOFT TISSUE ONCE
Status: COMPLETED | OUTPATIENT
Start: 2023-02-13 | End: 2023-02-13

## 2023-02-13 RX ADMIN — DEXAMETHASONE SODIUM PHOSPHATE 4 MG: 4 INJECTION, SOLUTION INTRA-ARTICULAR; INTRALESIONAL; INTRAMUSCULAR; INTRAVENOUS; SOFT TISSUE at 16:26

## 2023-02-13 ASSESSMENT — PATIENT HEALTH QUESTIONNAIRE - PHQ9
SUM OF ALL RESPONSES TO PHQ QUESTIONS 1-9: 4
CLINICAL INTERPRETATION OF PHQ2 SCORE: 1
5. POOR APPETITE OR OVEREATING: 0 - NOT AT ALL

## 2023-02-13 ASSESSMENT — PAIN SCALES - GENERAL: PAINLEVEL: 6=MODERATE PAIN

## 2023-02-14 NOTE — PROCEDURES
Patient Name: Andrei Galeas  : 1945  Date of Service: 2023    Physician/s: Claudia Cervantes MD    Pre-operative Diagnosis: LEFT knee osteoarthritis, pain    Post-operative Diagnosis: LEFT knee osteoarthritis, pain    Procedure: LEFT knee injection ultrasound-guided    Description of procedure:    The risks, benefits, and alternatives of the procedure were reviewed and discussed with the patient.  Written informed consent was freely obtained. A pre-procedural time-out was conducted by the physician verifying patient’s identity, procedure to be performed, procedure site and side, and allergy verification. Appropriate equipment was determined to be in place for the procedure.     No sedation was used for this procedure.     In the office suite the patient was placed in a supine position, and the knee was prepped and draped in the usual sterile fashion.     The ultrasound probe was placed over the LEFT suprapatellar joint recess.  The target for injection was marked and the subcutaneous tissues were anesthetized with 2cc of 1% lidocaine. Subsequently an 18g 1.5 inch needle was placed into skin and advanced under ultrasound guidance into the joint space. 14 ml of synovial fluid was aspirated and discarded. Following negative aspiration, 1 cc dexamethasone (4mg/mL) and 4 mL of 0.5% ropivacaine was injected into the joint, and the needle was subsequently removed intact.     The patient's knee(s) was wiped with a 4x4 gauze, the area was cleansed with alcohol prep, and a bandaid was applied. There were no complications noted.       Claudia Cervantes MD  Interventional Pain and Spine  Physical Medicine and Rehabilitation  [facility]

## 2023-03-17 ENCOUNTER — OFFICE VISIT (OUTPATIENT)
Dept: PHYSICAL MEDICINE AND REHAB | Facility: MEDICAL CENTER | Age: 78
End: 2023-03-17
Payer: MEDICARE

## 2023-03-17 VITALS
SYSTOLIC BLOOD PRESSURE: 160 MMHG | WEIGHT: 283.51 LBS | OXYGEN SATURATION: 97 % | BODY MASS INDEX: 39.69 KG/M2 | HEART RATE: 57 BPM | TEMPERATURE: 96.9 F | HEIGHT: 71 IN | DIASTOLIC BLOOD PRESSURE: 80 MMHG

## 2023-03-17 DIAGNOSIS — G89.29 CHRONIC MIDLINE LOW BACK PAIN WITHOUT SCIATICA: ICD-10-CM

## 2023-03-17 DIAGNOSIS — M25.562 CHRONIC PAIN OF LEFT KNEE: ICD-10-CM

## 2023-03-17 DIAGNOSIS — M54.16 LEFT LUMBAR RADICULITIS: ICD-10-CM

## 2023-03-17 DIAGNOSIS — G89.29 CHRONIC PAIN OF LEFT KNEE: ICD-10-CM

## 2023-03-17 DIAGNOSIS — M54.50 CHRONIC MIDLINE LOW BACK PAIN WITHOUT SCIATICA: ICD-10-CM

## 2023-03-17 DIAGNOSIS — M17.12 PRIMARY OSTEOARTHRITIS OF LEFT KNEE: Primary | ICD-10-CM

## 2023-03-17 PROCEDURE — 99213 OFFICE O/P EST LOW 20 MIN: CPT | Performed by: STUDENT IN AN ORGANIZED HEALTH CARE EDUCATION/TRAINING PROGRAM

## 2023-03-17 ASSESSMENT — PAIN SCALES - GENERAL: PAINLEVEL: 7=MODERATE-SEVERE PAIN

## 2023-03-17 ASSESSMENT — PATIENT HEALTH QUESTIONNAIRE - PHQ9
CLINICAL INTERPRETATION OF PHQ2 SCORE: 2
5. POOR APPETITE OR OVEREATING: 0 - NOT AT ALL
SUM OF ALL RESPONSES TO PHQ QUESTIONS 1-9: 6

## 2023-03-17 NOTE — PROGRESS NOTES
Follow-up patient Note    Interventional Pain and Spine  Physiatry (Physical Medicine and Rehabilitation)     Patient Name: Andrei Galeas  : 1945  Date of service: 3/17/2023    Chief Complaint:   Chief Complaint   Patient presents with    Follow-Up     Chronic pain of left knee       HISTORY  Please see new patient note by Dr. Cervantes for more details.     HPI  Today's visit   Andrei Galeas ( 1945) is a male with The primary encounter diagnosis was Primary osteoarthritis of left knee. Diagnoses of Chronic pain of left knee, Left lumbar radiculitis, and Chronic midline low back pain without sciatica were also pertinent to this visit.    Presents today after  23 left knee steroid injection with 3 days of significant relief.  Now his left knee feels the same as before the injection.  He would like to consider a gel injection for his knee.  His medial left knee is his worst area of pain.    Notes worsened sensation of dull achy back pain when standing for more than 10-15 mins.  He denies radiating pain in the legs or numbness or tingling in the legs.    Taking tylenol or advil OTC for pain with unsure relief. Takes gabapentin 100mg QHS with unsure relief and no known unwanted SE.     Pain severity 7/10 currently  Pt denies new numbness, tingling, or weakness.    Procedure history:  - 22 Lumbar Epidural Steroid Injection at the left L5-S1 level - 100% pain relief x 1 month, then started wearing off 1 week ago, now 20-30% better.  Denies radiating pain in legs at follow-up on 3/17/2023.  - 23 left knee steroid injection - 3 days of significant relief      ROS:   Red Flags ROS:   Fever, Chills, Sweats: Denies  Involuntary Weight Loss: Denies  Bladder Incontinence: Denies  Bowel Incontinence: denies  Saddle Anesthesia: Denies    All other systems reviewed and negative.     PMHx:   Past Medical History:   Diagnosis Date    Arthritis     Back pain     Cataract     Depression      Diabetes (HCC)     borderline    Hypertension     Obstructive chronic bronchitis without exacerbation (HCC) 2022    Shortness of breath     Sinusitis     Sleep apnea     Wheezing        PSHx:   Past Surgical History:   Procedure Laterality Date    ID INJ LUMBAR/SACRAL,W/ IMAGING Left 2022    Procedure: LEFT lumbar L5-S1 interlaminar epidural steroid injection.;  Surgeon: Claudia Cervantes M.D.;  [facility];  Service: Pain Management    SINUSOTOMIES         Family Hx:   Family History   Problem Relation Age of Onset    Heart Disease Paternal Uncle         MI    Arthritis Mother     Psychiatric Illness Father         stomach cancer    Hypertension Father     Hyperlipidemia Father     Stroke Father        Social Hx:  Social History     Socioeconomic History    Marital status:      Spouse name: Not on file    Number of children: Not on file    Years of education: Not on file    Highest education level: Not on file   Occupational History    Not on file   Tobacco Use    Smoking status: Former     Packs/day: 2.00     Years: 10.00     Pack years: 20.00     Types: Cigarettes     Quit date: 1977     Years since quittin.5    Smokeless tobacco: Never   Vaping Use    Vaping Use: Never used   Substance and Sexual Activity    Alcohol use: Yes     Comment: 1-2 drinks every week or two    Drug use: No    Sexual activity: Not on file   Other Topics Concern    Not on file   Social History Narrative    Not on file     Social Determinants of Health     Financial Resource Strain: Not on file   Food Insecurity: No Food Insecurity    Worried About Running Out of Food in the Last Year: Never true    Ran Out of Food in the Last Year: Never true   Transportation Needs: No Transportation Needs    Lack of Transportation (Medical): No    Lack of Transportation (Non-Medical): No   Physical Activity: Inactive    Days of Exercise per Week: 0 days    Minutes of Exercise per Session: 0 min    Stress: Stress Concern Present    Feeling of Stress : To some extent   Social Connections: Socially Integrated    Frequency of Communication with Friends and Family: Three times a week    Frequency of Social Gatherings with Friends and Family: Once a week    Attends Religion Services: More than 4 times per year    Active Member of Clubs or Organizations: Yes    Attends Club or Organization Meetings: More than 4 times per year    Marital Status:    Intimate Partner Violence: Not on file   Housing Stability: Low Risk     Unable to Pay for Housing in the Last Year: No    Number of Places Lived in the Last Year: 1    Unstable Housing in the Last Year: No       Allergies:  Allergies   Allergen Reactions    Penicillins Unspecified     Childhood - unknown reaction       Medications: reviewed on epic.   Outpatient Medications Marked as Taking for the 3/17/23 encounter (Office Visit) with Claudia Cervantes M.D.   Medication Sig Dispense Refill    diphenhydrAMINE-APAP, sleep, (TYLENOL PM EXTRA STRENGTH)  MG Tab Take 1 Tablet by mouth at bedtime as needed.      Potassium 99 MG Tab Take 0.5 Tablets by mouth every day.      tamsulosin (FLOMAX) 0.4 MG capsule TAKE 2 CAPSULES BY MOUTH ONCE DAILY AS DIRECTED AFTER A MEAL IN THE EVENING      gabapentin (NEURONTIN) 100 MG Cap Take 1-2 tabs po at bedtime as needed for pain 60 Capsule 1    rosuvastatin (CRESTOR) 10 MG Tab Take 1 Tablet by mouth every day for 360 days. 90 Tablet 3    losartan-hydrochlorothiazide (HYZAAR) 100-25 MG per tablet Take 1 Tablet by mouth every day for 360 days. 90 Tablet 3    finasteride (PROSCAR) 5 MG Tab Take 1 Tablet by mouth every day for 360 days. 90 Tablet 3    ketoconazole (NIZORAL) 2 % Cream ketoconazole 2 % topical cream   APPLY CREAM TOPICALLY TO AFFECTED AREA ON THE SKIN TWICE DAILY      fexofenadine (ALLEGRA) 60 MG Tab Take 60 mg by mouth every day.      multivitamin (THERAGRAN) Tab Take 1 Tab by mouth every day.      fluticasone  "(FLONASE) 50 MCG/ACT nasal spray Spray 1 Spray in nose every day.          Current Outpatient Medications on File Prior to Visit   Medication Sig Dispense Refill    diphenhydrAMINE-APAP, sleep, (TYLENOL PM EXTRA STRENGTH)  MG Tab Take 1 Tablet by mouth at bedtime as needed.      Potassium 99 MG Tab Take 0.5 Tablets by mouth every day.      tamsulosin (FLOMAX) 0.4 MG capsule TAKE 2 CAPSULES BY MOUTH ONCE DAILY AS DIRECTED AFTER A MEAL IN THE EVENING      gabapentin (NEURONTIN) 100 MG Cap Take 1-2 tabs po at bedtime as needed for pain 60 Capsule 1    rosuvastatin (CRESTOR) 10 MG Tab Take 1 Tablet by mouth every day for 360 days. 90 Tablet 3    losartan-hydrochlorothiazide (HYZAAR) 100-25 MG per tablet Take 1 Tablet by mouth every day for 360 days. 90 Tablet 3    finasteride (PROSCAR) 5 MG Tab Take 1 Tablet by mouth every day for 360 days. 90 Tablet 3    ketoconazole (NIZORAL) 2 % Cream ketoconazole 2 % topical cream   APPLY CREAM TOPICALLY TO AFFECTED AREA ON THE SKIN TWICE DAILY      fexofenadine (ALLEGRA) 60 MG Tab Take 60 mg by mouth every day.      multivitamin (THERAGRAN) Tab Take 1 Tab by mouth every day.      fluticasone (FLONASE) 50 MCG/ACT nasal spray Spray 1 Spray in nose every day.       No current facility-administered medications on file prior to visit.         EXAMINATION     Physical Exam:   BP (!) 160/80 (BP Location: Left arm, Patient Position: Sitting, BP Cuff Size: Adult)   Pulse (!) 57   Temp 36.1 °C (96.9 °F) (Temporal)   Ht 1.803 m (5' 11\")   Wt (!) 129 kg (283 lb 8.2 oz)   SpO2 97%     Constitutional:   Body Habitus: Body mass index is 39.54 kg/m².  Cooperation: Fully cooperates with exam  Appearance: Well-groomed, well-nourished.    Eyes: No scleral icterus to suggest severe liver disease, no proptosis to suggest severe hyperthyroidism    ENT -no obvious auditory deficits, no noticeable facial droop     Skin -no rashes or lesions noted     Respiratory-  breathing comfortably on room " air, no audible wheezing    Cardiovascular-distal extremities warm and well perfused.  No lower extremity edema is noted.     Gastrointestinal - no obvious abdominal masses, non-distended    Psychiatric- alert and oriented ×3. Normal affect.     Gait - normal gait, no use of ambulatory device, nonantalgic.   Musculoskeletal and Neuro -         Thoracic/Lumbar Spine/Sacral Spine/Hips   Focal tenderness to palpation at left knee medial joint line, just inferior to left knee medial joint line, and supra patellar facet.  Full active range of motion with knee flexion and extension.  No tenderness to palpation elsewhere in left knee.    Facet loading maneuver positive bilaterally     Palpation:   Tenderness to palpation over the bilateral lower lumbar facets. No tenderness to palpation elsewhere in the low back/hips including midline of lumbosacral spine, paraspinal muscles bilaterally, sacroiliac joints bilaterally, PSIS bilaterally, and greater trochanters bilaterally.    Inspection: No evidence of atrophy in bilateral lower extremities throughout   .   Lumbar spine /hip provocative exam maneuvers  Straight leg raise negative bilaterally  FADIR test negative bilaterally     SI joint tests  ASYA test negative bilaterally  Thigh thrust test negative bilaterally     Key points for the international standards for neurological classification of spinal cord injury (ISNCSCI) to light touch.   Dermatome R L   L2 2 2   L3 2 2   L4 2 2   L5 2 2   S1 2 2   S2 2 2         Motor Exam Lower Extremities  ? Myotome R L   Hip flexion L2 5 5   Knee extension L3 5 5   Ankle dorsiflexion L4 5 5   Toe extension L5 5 5   Ankle plantarflexion S1 5 5        Previous exam  Cervical spine   Inspection: No deformities of the skin over the cervical spine. No rashes or lesions.     Spurling's sign  negative bilaterally        Key points for the international standards for neurological classification of spinal cord injury (ISNCSCI) to light touch.       Dermatome R L   C4 2 2   C5 2 2   C6 2 2   C7 2 2   C8 2 2   T1 2 2   T2 2 2         Motor Exam Upper Extremities   ? Myotome R L   Shoulder abduction C5 5 5   Elbow flexion C5 5 5   Wrist extension C6 5 5   Elbow extension C7 5 5   Finger flexion C8 5 5   Finger abduction T1 5 5      Reflexes  ?   R L   Biceps   2+ 2+   Brachioradialis   2+ 2+      March's sign negative bilaterally       Bilateral hands:   Inspection: No swelling, deformities, or rashes. Symmetric appearing thenar and hyperthenar regions bilaterally.  Palpation: no significant tenderness to palpation throughout the bilateral hands  Range of motion is within normal limits throughout bilateral hands, fingers and wrist.     Special tests:  Tinel's at the wrist over the median nerve negative bilaterally  Carpal tunnel compression: negative bilaterally  Phalen's test: negative bilaterally  Tinel's at the cubital tunnel: negative bilaterally    Slump-sit test negative bilaterally         Reflexes  ?   R L   Patella   2+ 2+   Achilles    2+ 2+      Clonus of the ankle negative bilaterally      Heel walking and toe walking intact.         MEDICAL DECISION MAKING    Medical records review: see under HPI section.     DATA    Labs: No new labs available for review since last visit.   Lab Results   Component Value Date/Time    SODIUM 136 12/08/2022 10:57 AM    POTASSIUM 4.0 12/08/2022 10:57 AM    CHLORIDE 98 12/08/2022 10:57 AM    CO2 24 12/08/2022 10:57 AM    ANION 14.0 12/08/2022 10:57 AM    GLUCOSE 120 (H) 12/08/2022 10:57 AM    BUN 16 12/08/2022 10:57 AM    CREATININE 0.87 12/08/2022 10:57 AM    CALCIUM 10.0 12/08/2022 10:57 AM    ASTSGOT 56 (H) 12/08/2022 10:57 AM    ALTSGPT 80 (H) 12/08/2022 10:57 AM    TBILIRUBIN 0.6 12/08/2022 10:57 AM    ALBUMIN 4.6 12/08/2022 10:57 AM    TOTPROTEIN 7.3 12/08/2022 10:57 AM    GLOBULIN 2.7 12/08/2022 10:57 AM    AGRATIO 1.7 12/08/2022 10:57 AM       No results found for: PROTHROMBTM, INR     Lab Results    Component Value Date/Time    WBC 8.9 08/14/2016 10:50 AM    RBC 5.13 08/14/2016 10:50 AM    HEMOGLOBIN 15.7 08/14/2016 10:50 AM    HEMATOCRIT 45.6 08/14/2016 10:50 AM    MCV 88.9 08/14/2016 10:50 AM    MCH 30.5 08/14/2016 10:50 AM    MCHC 34.3 08/14/2016 10:50 AM    MPV 7.9 08/14/2016 10:50 AM    NEUTSPOLYS 54.6 12/14/2012 08:52 AM    LYMPHOCYTES 32.8 12/14/2012 08:52 AM    MONOCYTES 10.0 (H) 12/14/2012 08:52 AM    EOSINOPHILS 2.3 12/14/2012 08:52 AM    BASOPHILS 0.3 12/14/2012 08:52 AM        Lab Results   Component Value Date/Time    HBA1C 6.2 (H) 08/05/2022 10:03 AM        Imaging:   I personally reviewed following images, these are my reads  MRI lumbar spine 11/11/22  Disc bulge most notable at L2-3, L3-4, and L4-5. Severe left  and mild right neuroforaminal stenosis at L4-5. Mild right neuroforaminal stenosis at L5-S1. Moderate bilateral lateral recess narrowing narrowing and moderate right foraminal narrowing at L3-4. See formal radiology report for further details.    X-ray left knee 11/26/2022  Patella fermin.  Quadriceps tendon enthesophyte.  Mild osteoarthritis at medial compartment.     XR left knee 1/7/2023  Mild tricompartmental osteoarthritis.  Appears slightly worse at medial compartment.  Alignment intact. See formal radiology report for further details.    IMAGING radiology reads. I reviewed the following radiology reads   X-ray left knee 1/7/2023  FINDINGS:  Bone density is normal. There is no evidence of fracture or dislocation. There is tricompartment osteoarthritis characterized by osteophytic spurring. There is a small joint effusion.     IMPRESSION:     Mild tricompartment osteoarthritis.                         Results for orders placed in visit on 11/11/22     MR-LUMBAR SPINE-W/O     Impression  Mild lumbar spine degenerative changes. Type I marrow changes are noted to the adjacent L3-L4 endplates.     There is moderate right-sided foraminal narrowing at the L3-4 level with impingement upon  the exiting right L3 nerve     Severe left foraminal narrowing at L4-5 with L4 nerve impingement.     Partially visualized is a mass involving the right renal upper pole. Recommend additional evaluation with a CT of the abdomen and pelvis with contrast, renal protocol.     These unexpected findings were communicated to the ordering provider by Epic inbasket message at 5:14 PM on 2022.               X-ray left knee 2022  FINDINGS:  No acute fracture or dislocation.     Mild osteoarthritis     Small knee joint effusion.     IMPRESSION:        1. No acute osseous abnormality.    Diagnosis  Visit Diagnoses     ICD-10-CM   1. Primary osteoarthritis of left knee  M17.12   2. Chronic pain of left knee  M25.562    G89.29   3. Left lumbar radiculitis  M54.16   4. Chronic midline low back pain without sciatica  M54.50    G89.29           ASSESSMENT AND PLAN:  Andrei Galeas (: 1945) is a male with left low back and left knee and medial calf pain that appears to have component of left lumbar radiculitis from severe left neuroforaminal stenosis at L4-5, with resolution of radiating calf pain after epidural.  Now with significant left knee pain that appears to be mediated by osteoarthritis.     Andrei was seen today for follow-up.    Diagnoses and all orders for this visit:    Primary osteoarthritis of left knee  -     Referral to Pain Clinic    Chronic pain of left knee  -     Referral to Pain Clinic    Left lumbar radiculitis    Chronic midline low back pain without sciatica            PLAN  Physical Therapy: Discussed my recommendation of the patient pursue physical therapy to maximize the likelihood of long-term pain relief.  He declined today noting that he would like to focus on a knee injection first      Diagnostic workup:   Personally reviewed at today's visit: X-ray left knee 2023     Medications:   - continue gabapentin, OTC tylenol, OTC ibuprofen     Interventions:   - interlaminar  left L5-S1 epidural steroid injection PRN if radiating pain returns  -Plan for Synvisc One injection for left knee under ultrasound guidance given limited relief with left knee steroid injection.  Plan for knee joint aspiration if possible.  The risks, benefits, and alternatives to this procedure were discussed and the patient wishes to proceed with the procedure. Risks include but are not limited to damage to surrounding structures, infection, bleeding, worsening of pain which can be permanent, and weakness which can be permanent. Benefits include pain relief and improved function. Alternatives include not doing the procedure.    - discussed possible diagnostic lumbar medial branch blocks. Pt would like to proceed with knee injection first    Other  -I have discussed that the patient's UE numbness do not appear to follow a specific dermatomal pattern but the pain radiating down his right arm might be from cervical radiculitis, and that further evaluation with EMG/NCS or cervical MRI could be considered. He elected to decline this at this time    Follow-up: Left knee Synvisc One injection in 2 weeks    Orders Placed This Encounter    Referral to Pain Clinic       Claudia Cervantes MD  Interventional Pain and Spine  Physical Medicine and Rehabilitation  [facility]      The above note documents my personal evaluation of this patient. In addition, I have reviewed and confirmed with the patient and MA the supportive information documented in today's Patient Health Questionnaire and Office Note.     Please note that this dictation was created using voice recognition software. I have made every reasonable attempt to correct obvious errors, but I expect that there are errors of grammar and possibly content that I did not discover before finalizing the note.

## 2023-03-21 ENCOUNTER — TELEPHONE (OUTPATIENT)
Dept: PHYSICAL MEDICINE AND REHAB | Facility: MEDICAL CENTER | Age: 78
End: 2023-03-21
Payer: MEDICARE

## 2023-03-21 NOTE — TELEPHONE ENCOUNTER
"Called . Spoke with [facility] auth rep \"Kehinde\". She said that someone would be calling Dr. Cervantes on her cell for a P2P call, EITHER today (3/21) between 4:30-6, or tomorrow afternoon (3/22). If they call tomorrow, they will likely dial my line first.    ----- Message from Claudia Cervantes M.D. sent at 3/21/2023  1:42 PM PDT -----  I just tried calling the number for a fast appeal [phone]  but got caught in a circular phone tree. I'd like to do a fast appeal.  The  told me to call [facility] .     I had to hang up because I have to do procedures now. Are you able to call? If needed, they can call me back to do a peer to peer anytime today after 4:30 on my cell phone .       ----- Message -----  From: [facility]  Sent: 3/21/2023  10:36 AM PDT  To: Claudia Cervantes M.D.    This pt had his referral for synvisc denied. There is an option for a \"fast appeal\" by phone noted on the referral. Please let me know how you prefer to proceed.    \"How to ask for an appeal with [facility]:   Step 1: You, your representative, or your doctor/provider must ask us for an appeal. Your written request must include:   -Your name   -Address   -Member number   -Reasons for appealing   -Whether you want a Standard or Fast Appeal (for a Fast Appeal, explain why you need one)  -Any evidence you want us to review, such as medical records, doctor's letters, or other information that explains why you need the item or service. Call your doctor if you need this information.    If you're asking for an appeal and missed the deadline, you may ask for an extension and should include your reason for being late.   We recommend keeping a copy of everything you send us for your records.   You can ask to see the medical records and other documents we used to make our decision before or during the appeal. At no cost to you, you can also ask for a copy of the " guidelines/clinical rationale we used to make our decision.   Step 2: Mail, phone, fax, or deliver your appeal.    For a Standard Appeal:    [facility] [facility]   Attn: Member Appeals   [id number] [id number]   Phone: [phone] or toll free at [phone]  [employer] users call: [phone]   Fax: [phone]  Website: [email or web address]     For a Fast Appeal:  Phone: [phone] or toll free at [phone]  [employer] users call: [phone]   Fax: [phone]   Website: [email or web address]

## 2023-03-22 ENCOUNTER — TELEPHONE (OUTPATIENT)
Dept: PHYSICAL MEDICINE AND REHAB | Facility: MEDICAL CENTER | Age: 78
End: 2023-03-22
Payer: MEDICARE

## 2023-03-22 NOTE — TELEPHONE ENCOUNTER
"Phone Number Called: [phone]    Call outcome: Left detailed message for patient. Informed to call back with any additional questions.    Message: Called pt to relay Dr. Cervantes's message:    \"Just chatted with [employer]. They clarified that the synvisc one injection authorization requires 2 knee joint steroid injections first. Can you please contact the pt to see if he's interested in getting a second knee joint steroid injection? There is a chance it would help more than the first\"    Requested Don call us back to schedule when convenient.  "

## 2023-03-24 ENCOUNTER — OFFICE VISIT (OUTPATIENT)
Dept: PHYSICAL MEDICINE AND REHAB | Facility: MEDICAL CENTER | Age: 78
End: 2023-03-24
Payer: MEDICARE

## 2023-03-24 VITALS
HEART RATE: 65 BPM | HEIGHT: 71 IN | OXYGEN SATURATION: 95 % | WEIGHT: 281.97 LBS | SYSTOLIC BLOOD PRESSURE: 138 MMHG | TEMPERATURE: 97.4 F | DIASTOLIC BLOOD PRESSURE: 70 MMHG | BODY MASS INDEX: 39.48 KG/M2

## 2023-03-24 DIAGNOSIS — G89.29 CHRONIC PAIN OF LEFT KNEE: Primary | ICD-10-CM

## 2023-03-24 DIAGNOSIS — M25.562 CHRONIC PAIN OF LEFT KNEE: Primary | ICD-10-CM

## 2023-03-24 DIAGNOSIS — M17.12 PRIMARY OSTEOARTHRITIS OF LEFT KNEE: ICD-10-CM

## 2023-03-24 PROCEDURE — 20611 DRAIN/INJ JOINT/BURSA W/US: CPT | Mod: LT | Performed by: STUDENT IN AN ORGANIZED HEALTH CARE EDUCATION/TRAINING PROGRAM

## 2023-03-24 RX ORDER — DEXAMETHASONE SODIUM PHOSPHATE 4 MG/ML
4 INJECTION, SOLUTION INTRA-ARTICULAR; INTRALESIONAL; INTRAMUSCULAR; INTRAVENOUS; SOFT TISSUE ONCE
Status: COMPLETED | OUTPATIENT
Start: 2023-03-24 | End: 2023-03-24

## 2023-03-24 RX ADMIN — DEXAMETHASONE SODIUM PHOSPHATE 4 MG: 4 INJECTION, SOLUTION INTRA-ARTICULAR; INTRALESIONAL; INTRAMUSCULAR; INTRAVENOUS; SOFT TISSUE at 11:53

## 2023-03-24 ASSESSMENT — PAIN SCALES - GENERAL: PAINLEVEL: 7=MODERATE-SEVERE PAIN

## 2023-03-24 ASSESSMENT — PATIENT HEALTH QUESTIONNAIRE - PHQ9: CLINICAL INTERPRETATION OF PHQ2 SCORE: 0

## 2023-03-24 NOTE — PROCEDURES
Patient Name: Andrei Galeas  : 1945  Date of Service: 23    Physician/s: Claudia Cervantes MD    Pre-operative Diagnosis: LEFT knee osteoarthritis, pain    Post-operative Diagnosis: LEFT knee osteoarthritis, pain    Procedure: LEFT knee injection ultrasound-guided    Description of procedure:    The risks, benefits, and alternatives of the procedure were reviewed and discussed with the patient.  Written informed consent was freely obtained. A pre-procedural time-out was conducted by the physician verifying patient’s identity, procedure to be performed, procedure site and side, and allergy verification. Appropriate equipment was determined to be in place for the procedure.     No sedation was used for this procedure.     In the office suite the patient was placed in a supine position, and the knee was prepped and draped in the usual sterile fashion.     The ultrasound probe was placed over the LEFT suprapatellar joint recess.  The target for injection was marked and the subcutaneous tissues were anesthetized with 2cc of 1% lidocaine. Subsequently an 18g 1.5 inch needle was placed into skin and advanced under ultrasound guidance into the joint space. No synovial fluid was aspirated and discarded. The needle was removed.  Subsequently a 22-gauge 3.5 inch needle was placed into the skin and advanced under ultrasound guidance into the joint space.  Following negative aspiration, 1 cc dexamethasone (4mg/mL) and 4 mL of 0.5% ropivacaine was injected into the joint, and the needle was subsequently removed intact.     The patient's knee(s) was wiped with a 4x4 gauze, the area was cleansed with alcohol prep, and a bandaid was applied. There were no complications noted.       Claudia Cervantes MD  Interventional Pain and Spine  Physical Medicine and Rehabilitation  [facility]

## 2023-04-07 ENCOUNTER — OFFICE VISIT (OUTPATIENT)
Dept: PHYSICAL MEDICINE AND REHAB | Facility: MEDICAL CENTER | Age: 78
End: 2023-04-07
Payer: MEDICARE

## 2023-04-07 VITALS
HEIGHT: 71 IN | SYSTOLIC BLOOD PRESSURE: 148 MMHG | TEMPERATURE: 97.1 F | OXYGEN SATURATION: 95 % | HEART RATE: 58 BPM | WEIGHT: 283.07 LBS | DIASTOLIC BLOOD PRESSURE: 68 MMHG | BODY MASS INDEX: 39.63 KG/M2

## 2023-04-07 DIAGNOSIS — M47.816 LUMBAR SPONDYLOSIS: ICD-10-CM

## 2023-04-07 DIAGNOSIS — G89.29 CHRONIC MIDLINE LOW BACK PAIN WITHOUT SCIATICA: ICD-10-CM

## 2023-04-07 DIAGNOSIS — M25.562 CHRONIC PAIN OF LEFT KNEE: Primary | ICD-10-CM

## 2023-04-07 DIAGNOSIS — M54.50 CHRONIC MIDLINE LOW BACK PAIN WITHOUT SCIATICA: ICD-10-CM

## 2023-04-07 DIAGNOSIS — M17.12 PRIMARY OSTEOARTHRITIS OF LEFT KNEE: ICD-10-CM

## 2023-04-07 DIAGNOSIS — M54.16 LEFT LUMBAR RADICULITIS: ICD-10-CM

## 2023-04-07 DIAGNOSIS — G89.29 CHRONIC PAIN OF LEFT KNEE: Primary | ICD-10-CM

## 2023-04-07 PROCEDURE — 99213 OFFICE O/P EST LOW 20 MIN: CPT | Performed by: STUDENT IN AN ORGANIZED HEALTH CARE EDUCATION/TRAINING PROGRAM

## 2023-04-07 ASSESSMENT — PAIN SCALES - GENERAL: PAINLEVEL: 7=MODERATE-SEVERE PAIN

## 2023-04-07 ASSESSMENT — PATIENT HEALTH QUESTIONNAIRE - PHQ9: CLINICAL INTERPRETATION OF PHQ2 SCORE: 0

## 2023-04-07 NOTE — PROGRESS NOTES
Follow-up patient Note    Interventional Pain and Spine  Physiatry (Physical Medicine and Rehabilitation)     Patient Name: Andrei Galeas  : 1945  Date of service: 2023    Chief Complaint:   Chief Complaint   Patient presents with    Follow-Up     Chronic pain of left knee       HISTORY  Please see new patient note by Dr. Cervantes for more details.     HPI  Today's visit   Andrei Galeas ( 1945) is a male with The primary encounter diagnosis was Chronic pain of left knee. Diagnoses of Primary osteoarthritis of left knee, Left lumbar radiculitis, Chronic midline low back pain without sciatica, and Lumbar spondylosis were also pertinent to this visit.    Presents today after  3/24/23 left knee steroid injection - with 3 days of relief.  Pain is marginally improved but still significantly bothersome.  Pain is worst at the medial left knee.  It limits his ability to walk easily and sleep.  Currently rates 7/10 on NRS.     Notes worsened sensation of dull achy back pain when standing for more than 10-15 mins.  He denies radiating pain in the legs or numbness or tingling in the legs.  This limits his ability to easily stand at [employer].    Taking tylenol or advil OTC for pain with no significant relief.  Has taken gabapentin 100-200mg QHS with no noticeable relief and no known unwanted SE.     Procedure history:  - 22 Lumbar Epidural Steroid Injection at the left L5-S1 level - 100% pain relief x 1 month, then started wearing off 1 week ago, now 20-30% better.  Denies radiating pain in legs at follow-up on 3/17/2023.  - 23 left knee steroid injection - 3 days of significant relief  - 3/24/23 left knee steroid injection - 3 days of significant relief      ROS:   Red Flags ROS:   Fever, Chills, Sweats: Denies  Involuntary Weight Loss: Denies  Bladder Incontinence: Denies  Bowel Incontinence: denies  Saddle Anesthesia: Denies    All other systems reviewed and negative.     PMHx:    Past Medical History:   Diagnosis Date    Arthritis     Back pain     Cataract     Depression     Diabetes (HCC)     borderline    Hypertension     Obstructive chronic bronchitis without exacerbation (HCC) 2022    Shortness of breath     Sinusitis     Sleep apnea     Wheezing        PSHx:   Past Surgical History:   Procedure Laterality Date    [place] INJ LUMBAR/SACRAL,W/ IMAGING Left 2022    Procedure: LEFT lumbar L5-S1 interlaminar epidural steroid injection.;  Surgeon: Claudia Cervantes M.D.;  [facility];  Service: Pain Management    SINUSOTOMIES         Family Hx:   Family History   Problem Relation Age of Onset    Heart Disease Paternal Uncle         MI    Arthritis Mother     Psychiatric Illness Father         stomach cancer    Hypertension Father     Hyperlipidemia Father     Stroke Father        Social Hx:  Social History     Socioeconomic History    Marital status:      Spouse name: Not on file    Number of children: Not on file    Years of education: Not on file    Highest education level: Not on file   Occupational History    Not on file   Tobacco Use    Smoking status: Former     Packs/day: 2.00     Years: 10.00     Pack years: 20.00     Types: Cigarettes     Quit date: 1977     Years since quittin.5    Smokeless tobacco: Never   Vaping Use    Vaping Use: Never used   Substance and Sexual Activity    Alcohol use: Yes     Comment: 1-2 drinks every week or two    Drug use: No    Sexual activity: Not on file   Other Topics Concern    Not on file   Social History Narrative    Not on file     Social Determinants of Health     Financial Resource Strain: Not on file   Food Insecurity: No Food Insecurity    Worried About Running Out of Food in the Last Year: Never true    Ran Out of Food in the Last Year: Never true   Transportation Needs: No Transportation Needs    Lack of Transportation (Medical): No    Lack of Transportation (Non-Medical): No   Physical  Activity: Inactive    Days of Exercise per Week: 0 days    Minutes of Exercise per Session: 0 min   Stress: Stress Concern Present    Feeling of Stress : To some extent   Social Connections: Socially Integrated    Frequency of Communication with Friends and Family: Three times a week    Frequency of Social Gatherings with Friends and Family: Once a week    Attends Confucianism Services: More than 4 times per year    Active Member of Clubs or Organizations: Yes    Attends Club or Organization Meetings: More than 4 times per year    Marital Status:    Intimate Partner Violence: Not on file   Housing Stability: Low Risk     Unable to Pay for Housing in the Last Year: No    Number of Places Lived in the Last Year: 1    Unstable Housing in the Last Year: No       Allergies:  Allergies   Allergen Reactions    Penicillins Unspecified     Childhood - unknown reaction       Medications: reviewed on epic.   Outpatient Medications Marked as Taking for the 4/7/23 encounter (Office Visit) with Claudia Cervantes M.D.   Medication Sig Dispense Refill    diphenhydrAMINE-APAP, sleep, (TYLENOL PM EXTRA STRENGTH)  MG Tab Take 1 Tablet by mouth at bedtime as needed.      Potassium 99 MG Tab Take 0.5 Tablets by mouth every day.      tamsulosin (FLOMAX) 0.4 MG capsule TAKE 2 CAPSULES BY MOUTH ONCE DAILY AS DIRECTED AFTER A MEAL IN THE EVENING      gabapentin (NEURONTIN) 100 MG Cap Take 1-2 tabs po at bedtime as needed for pain 60 Capsule 1    rosuvastatin (CRESTOR) 10 MG Tab Take 1 Tablet by mouth every day for 360 days. 90 Tablet 3    losartan-hydrochlorothiazide (HYZAAR) 100-25 MG per tablet Take 1 Tablet by mouth every day for 360 days. 90 Tablet 3    finasteride (PROSCAR) 5 MG Tab Take 1 Tablet by mouth every day for 360 days. 90 Tablet 3    ketoconazole (NIZORAL) 2 % Cream ketoconazole 2 % topical cream   APPLY CREAM TOPICALLY TO AFFECTED AREA ON THE SKIN TWICE DAILY      fexofenadine (ALLEGRA) 60 MG Tab Take 60 mg by mouth  "every day.      multivitamin (THERAGRAN) Tab Take 1 Tab by mouth every day.      fluticasone (FLONASE) 50 MCG/ACT nasal spray Spray 1 Spray in nose every day.          Current Outpatient Medications on File Prior to Visit   Medication Sig Dispense Refill    diphenhydrAMINE-APAP, sleep, (TYLENOL PM EXTRA STRENGTH)  MG Tab Take 1 Tablet by mouth at bedtime as needed.      Potassium 99 MG Tab Take 0.5 Tablets by mouth every day.      tamsulosin (FLOMAX) 0.4 MG capsule TAKE 2 CAPSULES BY MOUTH ONCE DAILY AS DIRECTED AFTER A MEAL IN THE EVENING      gabapentin (NEURONTIN) 100 MG Cap Take 1-2 tabs po at bedtime as needed for pain 60 Capsule 1    rosuvastatin (CRESTOR) 10 MG Tab Take 1 Tablet by mouth every day for 360 days. 90 Tablet 3    losartan-hydrochlorothiazide (HYZAAR) 100-25 MG per tablet Take 1 Tablet by mouth every day for 360 days. 90 Tablet 3    finasteride (PROSCAR) 5 MG Tab Take 1 Tablet by mouth every day for 360 days. 90 Tablet 3    ketoconazole (NIZORAL) 2 % Cream ketoconazole 2 % topical cream   APPLY CREAM TOPICALLY TO AFFECTED AREA ON THE SKIN TWICE DAILY      fexofenadine (ALLEGRA) 60 MG Tab Take 60 mg by mouth every day.      multivitamin (THERAGRAN) Tab Take 1 Tab by mouth every day.      fluticasone (FLONASE) 50 MCG/ACT nasal spray Spray 1 Spray in nose every day.       No current facility-administered medications on file prior to visit.         EXAMINATION     Physical Exam:   BP (!) 148/68 (BP Location: Right arm, Patient Position: Sitting, BP Cuff Size: Adult)   Pulse (!) 58   Temp 36.2 °C (97.1 °F) (Temporal)   Ht 1.803 m (5' 11\")   Wt (!) 128 kg (283 lb 1.1 oz)   SpO2 95%     Constitutional:   Body Habitus: Body mass index is 39.48 kg/m².  Cooperation: Fully cooperates with exam  Appearance: Well-groomed, well-nourished.    Eyes: No scleral icterus to suggest severe liver disease, no proptosis to suggest severe hyperthyroidism    ENT -no obvious auditory deficits, no noticeable " facial droop     Skin -no rashes or lesions noted     Respiratory-  breathing comfortably on room air, no audible wheezing    Cardiovascular-distal extremities warm and well perfused.  No lower extremity edema is noted.     Gastrointestinal - no obvious abdominal masses, non-distended    Psychiatric- alert and oriented ×3. Normal affect.     Gait - normal gait, no use of ambulatory device, nonantalgic.     Musculoskeletal and Neuro -       Thoracic/Lumbar Spine/Sacral Spine/Hips     Thoracic/Lumbar Spine/Sacral Spine/Hips   Focal tenderness to palpation at left knee medial joint line, just inferior to left knee medial joint line, and supra patellar facet.  Full active range of motion with knee flexion and extension.  No tenderness to palpation elsewhere in left knee.    Facet loading maneuver positive bilaterally, worse on left     Palpation:   Tenderness to palpation over the bilateral lower lumbar facets. No tenderness to palpation elsewhere in the low back/hips including midline of lumbosacral spine, paraspinal muscles bilaterally, sacroiliac joints bilaterally, PSIS bilaterally, and greater trochanters bilaterally.    Inspection: No evidence of atrophy in bilateral lower extremities throughout        Key points for the international standards for neurological classification of spinal cord injury (ISNCSCI) to light touch.   Dermatome R L   L2 2 2   L3 2 2   L4 2 2   L5 2 2   S1 2 2   S2 2 2         Motor Exam Lower Extremities  ? Myotome R L   Hip flexion L2 5 5   Knee extension L3 5 5   Ankle dorsiflexion L4 5 5   Toe extension L5 5 5   Ankle plantarflexion S1 5 5          Previous exam    Lumbar spine /hip provocative exam maneuvers  Straight leg raise negative bilaterally  FADIR test negative bilaterally     SI joint tests  ASYA test negative bilaterally  Thigh thrust test negative bilaterally    Cervical spine   Inspection: No deformities of the skin over the cervical spine. No rashes or lesions.      Spurling's sign  negative bilaterally        Key points for the international standards for neurological classification of spinal cord injury (ISNCSCI) to light touch.      Dermatome R L   C4 2 2   C5 2 2   C6 2 2   C7 2 2   C8 2 2   T1 2 2   T2 2 2         Motor Exam Upper Extremities   ? Myotome R L   Shoulder abduction C5 5 5   Elbow flexion C5 5 5   Wrist extension C6 5 5   Elbow extension C7 5 5   Finger flexion C8 5 5   Finger abduction T1 5 5      Reflexes  ?   R L   Biceps   2+ 2+   Brachioradialis   2+ 2+      March's sign negative bilaterally       Bilateral hands:   Inspection: No swelling, deformities, or rashes. Symmetric appearing thenar and hyperthenar regions bilaterally.  Palpation: no significant tenderness to palpation throughout the bilateral hands  Range of motion is within normal limits throughout bilateral hands, fingers and wrist.     Special tests:  Tinel's at the wrist over the median nerve negative bilaterally  Carpal tunnel compression: negative bilaterally  Phalen's test: negative bilaterally  Tinel's at the cubital tunnel: negative bilaterally    Slump-sit test negative bilaterally         Reflexes  ?   R L   Patella   2+ 2+   Achilles    2+ 2+      Clonus of the ankle negative bilaterally      Heel walking and toe walking intact.         MEDICAL DECISION MAKING    Medical records review: see under HPI section.     DATA    Labs: No new labs available for review since last visit.   Lab Results   Component Value Date/Time    SODIUM 136 12/08/2022 10:57 AM    POTASSIUM 4.0 12/08/2022 10:57 AM    CHLORIDE 98 12/08/2022 10:57 AM    CO2 24 12/08/2022 10:57 AM    ANION 14.0 12/08/2022 10:57 AM    GLUCOSE 120 (H) 12/08/2022 10:57 AM    BUN 16 12/08/2022 10:57 AM    CREATININE 0.87 12/08/2022 10:57 AM    CALCIUM 10.0 12/08/2022 10:57 AM    ASTSGOT 56 (H) 12/08/2022 10:57 AM    ALTSGPT 80 (H) 12/08/2022 10:57 AM    TBILIRUBIN 0.6 12/08/2022 10:57 AM    ALBUMIN 4.6 12/08/2022 10:57 AM     TOTPROTEIN 7.3 12/08/2022 10:57 AM    GLOBULIN 2.7 12/08/2022 10:57 AM    AGRATIO 1.7 12/08/2022 10:57 AM       No results found for: PROTHROMBTM, INR     Lab Results   Component Value Date/Time    WBC 8.9 08/14/2016 10:50 AM    RBC 5.13 08/14/2016 10:50 AM    HEMOGLOBIN 15.7 08/14/2016 10:50 AM    HEMATOCRIT 45.6 08/14/2016 10:50 AM    MCV 88.9 08/14/2016 10:50 AM    MCH 30.5 08/14/2016 10:50 AM    MCHC 34.3 08/14/2016 10:50 AM    MPV 7.9 08/14/2016 10:50 AM    NEUTSPOLYS 54.6 12/14/2012 08:52 AM    LYMPHOCYTES 32.8 12/14/2012 08:52 AM    MONOCYTES 10.0 (H) 12/14/2012 08:52 AM    EOSINOPHILS 2.3 12/14/2012 08:52 AM    BASOPHILS 0.3 12/14/2012 08:52 AM        Lab Results   Component Value Date/Time    HBA1C 6.2 (H) 08/05/2022 10:03 AM        Imaging:   I personally reviewed following images, these are my reads  MRI lumbar spine 11/11/22  Disc bulge most notable at L2-3, L3-4, and L4-5. Severe left  and mild right neuroforaminal stenosis at L4-5. Mild right neuroforaminal stenosis at L5-S1. Moderate bilateral lateral recess narrowing narrowing and moderate right foraminal narrowing at L3-4. See formal radiology report for further details.    X-ray left knee 11/26/2022  Patella fermin.  Quadriceps tendon enthesophyte.  Mild osteoarthritis at medial compartment.     XR left knee 1/7/2023  Mild tricompartmental osteoarthritis.  Appears slightly worse at medial compartment.  Alignment intact. See formal radiology report for further details.    IMAGING radiology reads. I reviewed the following radiology reads   X-ray left knee 1/7/2023  FINDINGS:  Bone density is normal. There is no evidence of fracture or dislocation. There is tricompartment osteoarthritis characterized by osteophytic spurring. There is a small joint effusion.     IMPRESSION:     Mild tricompartment osteoarthritis.                         Results for orders placed in visit on 11/11/22     MR-LUMBAR SPINE-W/O     Impression  Mild lumbar spine degenerative  changes. Type I marrow changes are noted to the adjacent L3-L4 endplates.     There is moderate right-sided foraminal narrowing at the L3-4 level with impingement upon the exiting right L3 nerve     Severe left foraminal narrowing at L4-5 with L4 nerve impingement.     Partially visualized is a mass involving the right renal upper pole. Recommend additional evaluation with a CT of the abdomen and pelvis with contrast, renal protocol.     These unexpected findings were communicated to the ordering provider by Epic inbasket message at 5:14 PM on 2022.               X-ray left knee 2022  FINDINGS:  No acute fracture or dislocation.     Mild osteoarthritis     Small knee joint effusion.     IMPRESSION:        1. No acute osseous abnormality.    Diagnosis  Visit Diagnoses     ICD-10-CM   1. Chronic pain of left knee  M25.562    G89.29   2. Primary osteoarthritis of left knee  M17.12   3. Left lumbar radiculitis  M54.16   4. Chronic midline low back pain without sciatica  M54.50    G89.29   5. Lumbar spondylosis  M47.816             ASSESSMENT AND PLAN:  Andrei Galeas (: 1945) is a male with left low back and left knee and medial calf pain that appears to have component of left lumbar radiculitis from severe left neuroforaminal stenosis at L4-5, with ongoing resolution of radiating calf pain after epidural.  Now with significant left knee pain that appears to be mediated by osteoarthritis.    Also with exam today notable for tenderness to palpation at bilateral lower lumbar facet joints with exam notable for positive lumbar facet loading maneuver bilaterally reproducing the patient's pain, suggestive of lumbar facetogenic pain.  Imaging shows lumbar spondylosis at bilateral lower lumbar levels.       Andrei was seen today for follow-up.    Diagnoses and all orders for this visit:    Chronic pain of left knee  -     Referral to Pain Clinic    Primary osteoarthritis of left knee  -     Referral  to Pain Clinic    Left lumbar radiculitis    Chronic midline low back pain without sciatica    Lumbar spondylosis              PLAN  Physical Therapy:  Discussed my recommendation of the patient pursue physical therapy to maximize the likelihood of long-term pain relief for both his knee and back.  I gave him a handout of physical therapy exercises today.  He declined formal physical therapy referral today noting that he would like to focus on a knee injection first     Diagnostic workup:   no new imaging needed at this time       Medications:   - continue OTC tylenol, OTC ibuprofen  -Discussed that he could consider continuing gabapentin per PCP discretion.  He does not feel that it is helping.    Interventions:   - interlaminar left L5-S1 epidural steroid injection PRN if radiating pain returns  -Plan for Synvisc One injection for left knee under ultrasound guidance given inadequate relief with left knee steroid injections x 2.  Plan for knee joint aspiration if possible.  The risks, benefits, and alternatives to this procedure were discussed and the patient wishes to proceed with the procedure. Risks include but are not limited to damage to surrounding structures, infection, bleeding, worsening of pain which can be permanent, and weakness which can be permanent. Benefits include pain relief and improved function. Alternatives include not doing the procedure.    - discussed possible diagnostic lumbar medial branch blocks. Pt would like to proceed with knee injection first    Other  -I have discussed that the patient's UE numbness do not appear to follow a specific dermatomal pattern but the pain radiating down his right arm might be from cervical radiculitis, and that further evaluation with EMG/NCS or cervical MRI could be considered. He elected to decline this at this time    Follow-up: Left knee Synvisc One injection in 2 weeks    Orders Placed This Encounter    Referral to Pain Clinic       Claudia Cervantes  MD  Interventional Pain and Spine  Physical Medicine and Rehabilitation  [facility]      The above note documents my personal evaluation of this patient. In addition, I have reviewed and confirmed with the patient and MA the supportive information documented in today's Patient Health Questionnaire and Office Note.     Please note that this dictation was created using voice recognition software. I have made every reasonable attempt to correct obvious errors, but I expect that there are errors of grammar and possibly content that I did not discover before finalizing the note.

## 2023-04-19 ENCOUNTER — APPOINTMENT (RX ONLY)
Dept: URBAN - METROPOLITAN AREA CLINIC 35 | Facility: CLINIC | Age: 78
Setting detail: DERMATOLOGY
End: 2023-04-19

## 2023-04-19 DIAGNOSIS — L82.1 OTHER SEBORRHEIC KERATOSIS: ICD-10-CM

## 2023-04-19 DIAGNOSIS — Z71.89 OTHER SPECIFIED COUNSELING: ICD-10-CM

## 2023-04-19 DIAGNOSIS — L56.5 DISSEMINATED SUPERFICIAL ACTINIC POROKERATOSIS (DSAP): ICD-10-CM

## 2023-04-19 DIAGNOSIS — L85.3 XEROSIS CUTIS: ICD-10-CM

## 2023-04-19 DIAGNOSIS — D22 MELANOCYTIC NEVI: ICD-10-CM

## 2023-04-19 DIAGNOSIS — D18.0 HEMANGIOMA: ICD-10-CM

## 2023-04-19 DIAGNOSIS — L81.4 OTHER MELANIN HYPERPIGMENTATION: ICD-10-CM

## 2023-04-19 PROBLEM — D22.9 MELANOCYTIC NEVI, UNSPECIFIED: Status: ACTIVE | Noted: 2023-04-19

## 2023-04-19 PROBLEM — D18.01 HEMANGIOMA OF SKIN AND SUBCUTANEOUS TISSUE: Status: ACTIVE | Noted: 2023-04-19

## 2023-04-19 PROCEDURE — 99213 OFFICE O/P EST LOW 20 MIN: CPT

## 2023-04-19 PROCEDURE — ? COUNSELING

## 2023-04-19 ASSESSMENT — LOCATION DETAILED DESCRIPTION DERM
LOCATION DETAILED: RIGHT PROXIMAL DORSAL FOREARM
LOCATION DETAILED: LEFT ANTERIOR DISTAL UPPER ARM
LOCATION DETAILED: LEFT INFERIOR CENTRAL MALAR CHEEK
LOCATION DETAILED: LEFT ANTERIOR DISTAL THIGH
LOCATION DETAILED: LEFT VENTRAL DISTAL FOREARM
LOCATION DETAILED: RIGHT ANTERIOR DISTAL UPPER ARM
LOCATION DETAILED: LEFT PROXIMAL DORSAL FOREARM
LOCATION DETAILED: RIGHT CENTRAL MALAR CHEEK
LOCATION DETAILED: INFERIOR THORACIC SPINE
LOCATION DETAILED: RIGHT VENTRAL MEDIAL PROXIMAL FOREARM
LOCATION DETAILED: LEFT INFERIOR UPPER BACK
LOCATION DETAILED: EPIGASTRIC SKIN
LOCATION DETAILED: LEFT MEDIAL FOREHEAD

## 2023-04-19 ASSESSMENT — LOCATION SIMPLE DESCRIPTION DERM
LOCATION SIMPLE: RIGHT FOREARM
LOCATION SIMPLE: RIGHT CHEEK
LOCATION SIMPLE: ABDOMEN
LOCATION SIMPLE: LEFT UPPER BACK
LOCATION SIMPLE: UPPER BACK
LOCATION SIMPLE: LEFT CHEEK
LOCATION SIMPLE: RIGHT UPPER ARM
LOCATION SIMPLE: LEFT UPPER ARM
LOCATION SIMPLE: LEFT FOREARM
LOCATION SIMPLE: LEFT THIGH
LOCATION SIMPLE: LEFT FOREHEAD

## 2023-04-19 ASSESSMENT — LOCATION ZONE DERM
LOCATION ZONE: TRUNK
LOCATION ZONE: ARM
LOCATION ZONE: FACE
LOCATION ZONE: LEG

## 2023-05-05 ENCOUNTER — APPOINTMENT (OUTPATIENT)
Dept: RADIOLOGY | Facility: MEDICAL CENTER | Age: 78
End: 2023-05-05
Attending: STUDENT IN AN ORGANIZED HEALTH CARE EDUCATION/TRAINING PROGRAM
Payer: MEDICARE

## 2023-05-05 ENCOUNTER — OFFICE VISIT (OUTPATIENT)
Dept: PHYSICAL MEDICINE AND REHAB | Facility: MEDICAL CENTER | Age: 78
End: 2023-05-05
Payer: MEDICARE

## 2023-05-05 VITALS
BODY MASS INDEX: 39.85 KG/M2 | SYSTOLIC BLOOD PRESSURE: 146 MMHG | HEART RATE: 62 BPM | TEMPERATURE: 97.3 F | DIASTOLIC BLOOD PRESSURE: 60 MMHG | WEIGHT: 284.61 LBS | OXYGEN SATURATION: 95 % | HEIGHT: 71 IN

## 2023-05-05 DIAGNOSIS — M47.816 LUMBAR SPONDYLOSIS: ICD-10-CM

## 2023-05-05 DIAGNOSIS — R20.0 NUMBNESS AND TINGLING OF RIGHT ARM: ICD-10-CM

## 2023-05-05 DIAGNOSIS — G89.29 CHRONIC PAIN OF LEFT KNEE: ICD-10-CM

## 2023-05-05 DIAGNOSIS — R20.0 NUMBNESS AND TINGLING IN LEFT ARM: ICD-10-CM

## 2023-05-05 DIAGNOSIS — R20.2 NUMBNESS AND TINGLING IN LEFT ARM: ICD-10-CM

## 2023-05-05 DIAGNOSIS — M17.12 PRIMARY OSTEOARTHRITIS OF LEFT KNEE: ICD-10-CM

## 2023-05-05 DIAGNOSIS — M54.2 CERVICALGIA: ICD-10-CM

## 2023-05-05 DIAGNOSIS — M25.562 CHRONIC PAIN OF LEFT KNEE: ICD-10-CM

## 2023-05-05 DIAGNOSIS — R20.2 NUMBNESS AND TINGLING OF RIGHT ARM: ICD-10-CM

## 2023-05-05 PROCEDURE — 72040 X-RAY EXAM NECK SPINE 2-3 VW: CPT

## 2023-05-05 PROCEDURE — 20611 DRAIN/INJ JOINT/BURSA W/US: CPT | Mod: LT | Performed by: STUDENT IN AN ORGANIZED HEALTH CARE EDUCATION/TRAINING PROGRAM

## 2023-05-05 PROCEDURE — 99214 OFFICE O/P EST MOD 30 MIN: CPT | Mod: 25 | Performed by: STUDENT IN AN ORGANIZED HEALTH CARE EDUCATION/TRAINING PROGRAM

## 2023-05-05 ASSESSMENT — PATIENT HEALTH QUESTIONNAIRE - PHQ9: CLINICAL INTERPRETATION OF PHQ2 SCORE: 0

## 2023-05-05 ASSESSMENT — PAIN SCALES - GENERAL: PAINLEVEL: 3=SLIGHT PAIN

## 2023-05-05 NOTE — H&P (VIEW-ONLY)
Follow-up patient Note    Interventional Pain and Spine  Physiatry (Physical Medicine and Rehabilitation)     Patient Name: Andrei Galeas  : 1945  Date of service: 2023    Chief Complaint:   Chief Complaint   Patient presents with    Follow-Up     Chronic pain of left knee       HISTORY  Please see new patient note by Dr. Cervantes for more details.     HPI  Today's visit   Andrei Galeas ( 1945) is a RHD male with Diagnoses of Chronic pain of left knee, Primary osteoarthritis of left knee, Lumbar spondylosis, Cervicalgia, Numbness and tingling in left arm, Numbness and tingling of right arm, and BMI 39.0-39.9,adult were pertinent to this visit.    Today Don presents for left knee Synvisc One injection.  Notes ongoing left knee pain, slightly improved after steroid injections but still significantly bothersome.  Pain severity in knee rates 3/10 on NRS today.    Also notes ongoing bilateral dull achy back pain that does not radiate.  Worse with standing for more than 10 to 15 minutes.    He also reports pain at his bilateral shoulders that interferes with his ability to sleep.  It typically does not bother him much during the day.  He endorses radiation of pain from his right neck down his right arm along with impaired sensation in his bilateral hands.  Denies known history of carpal tunnel syndrome or cervical radiculopathy.  Notes that when he was young he had recurrent shoulder subluxations and has had limited range of motion in the shoulders on a chronic basis.  Denies focal weakness in arm.    Taking tylenol or advil OTC for pain with no significant relief.  Has taken gabapentin 100-200mg QHS with no noticeable relief and no known unwanted SE.     Procedure history:  - 22 Lumbar Epidural Steroid Injection at the left L5-S1 level - 100% pain relief x 1 month, then started wearing off 1 week ago, now 20-30% better.  Denies radiating pain in legs at follow-up on 3/17/2023.  -  23 left knee steroid injection - 3 days of significant relief  - 3/24/23 left knee steroid injection - 3 days of significant relief  - 23 Left knee Synvisc One injection      ROS:   Red Flags ROS:   Fever, Chills, Sweats: Denies  Involuntary Weight Loss: Denies  Bladder Incontinence: Denies  Bowel Incontinence: denies  Saddle Anesthesia: Denies    All other systems reviewed and negative.     PMHx:   Past Medical History:   Diagnosis Date    Arthritis     Back pain     Cataract     Depression     Diabetes ([facility])     borderline    Hypertension     Obstructive chronic bronchitis without exacerbation ([facility]) 2022    Shortness of breath     Sinusitis     Sleep apnea     Wheezing        PSHx:   Past Surgical History:   Procedure Laterality Date    CT INJ LUMBAR/SACRAL,W/ IMAGING Left 2022    Procedure: LEFT lumbar L5-S1 interlaminar epidural steroid injection.;  Surgeon: Claudia Cervantes M.D.;  [facility];  Service: Pain Management    SINUSOTOMIES         Family Hx:   Family History   Problem Relation Age of Onset    Heart Disease Paternal Uncle         MI    Arthritis Mother     Psychiatric Illness Father         stomach cancer    Hypertension Father     Hyperlipidemia Father     Stroke Father        Social Hx:  Social History     Socioeconomic History    Marital status:      Spouse name: Not on file    Number of children: Not on file    Years of education: Not on file    Highest education level: Not on file   Occupational History    Not on file   Tobacco Use    Smoking status: Former     Packs/day: 2.00     Years: 10.00     Pack years: 20.00     Types: Cigarettes     Quit date: 1977     Years since quittin.6    Smokeless tobacco: Never   Vaping Use    Vaping Use: Never used   Substance and Sexual Activity    Alcohol use: Yes     Comment: 1-2 drinks every week or two    Drug use: No    Sexual activity: Not on file   Other Topics Concern    Not on file    Social History Narrative    Not on file     Social Determinants of Health     Financial Resource Strain: Not on file   Food Insecurity: No Food Insecurity    Worried About Running Out of Food in the Last Year: Never true    Ran Out of Food in the Last Year: Never true   Transportation Needs: No Transportation Needs    Lack of Transportation (Medical): No    Lack of Transportation (Non-Medical): No   Physical Activity: Inactive    Days of Exercise per Week: 0 days    Minutes of Exercise per Session: 0 min   Stress: Stress Concern Present    Feeling of Stress : To some extent   Social Connections: Socially Integrated    Frequency of Communication with Friends and Family: Three times a week    Frequency of Social Gatherings with Friends and Family: Once a week    Attends Yazidi Services: More than 4 times per year    Active Member of Clubs or Organizations: Yes    Attends Club or Organization Meetings: More than 4 times per year    Marital Status:    Intimate Partner Violence: Not on file   Housing Stability: Low Risk     Unable to Pay for Housing in the Last Year: No    Number of Places Lived in the Last Year: 1    Unstable Housing in the Last Year: No       Allergies:  Allergies   Allergen Reactions    Seasonal     Penicillins Unspecified     Childhood - unknown reaction       Medications: reviewed on epic.   Outpatient Medications Marked as Taking for the 5/5/23 encounter (Office Visit) with Claudia Cervantes M.D.   Medication Sig Dispense Refill    diphenhydrAMINE-APAP, sleep, (TYLENOL PM EXTRA STRENGTH)  MG Tab Take 1 Tablet by mouth at bedtime as needed.      Potassium 99 MG Tab Take 0.5 Tablets by mouth every day.      tamsulosin (FLOMAX) 0.4 MG capsule TAKE 2 CAPSULES BY MOUTH ONCE DAILY AS DIRECTED AFTER A MEAL IN THE EVENING      gabapentin (NEURONTIN) 100 MG Cap Take 1-2 tabs po at bedtime as needed for pain 60 Capsule 1    rosuvastatin (CRESTOR) 10 MG Tab Take 1 Tablet by mouth every day for  360 days. 90 Tablet 3    losartan-hydrochlorothiazide (HYZAAR) 100-25 MG per tablet Take 1 Tablet by mouth every day for 360 days. 90 Tablet 3    finasteride (PROSCAR) 5 MG Tab Take 1 Tablet by mouth every day for 360 days. 90 Tablet 3    ketoconazole (NIZORAL) 2 % Cream ketoconazole 2 % topical cream   APPLY CREAM TOPICALLY TO AFFECTED AREA ON THE SKIN TWICE DAILY      fexofenadine (ALLEGRA) 60 MG Tab Take 60 mg by mouth every day.      multivitamin (THERAGRAN) Tab Take 1 Tab by mouth every day.      fluticasone (FLONASE) 50 MCG/ACT nasal spray Spray 1 Spray in nose every day.          Current Outpatient Medications on File Prior to Visit   Medication Sig Dispense Refill    diphenhydrAMINE-APAP, sleep, (TYLENOL PM EXTRA STRENGTH)  MG Tab Take 1 Tablet by mouth at bedtime as needed.      Potassium 99 MG Tab Take 0.5 Tablets by mouth every day.      tamsulosin (FLOMAX) 0.4 MG capsule TAKE 2 CAPSULES BY MOUTH ONCE DAILY AS DIRECTED AFTER A MEAL IN THE EVENING      gabapentin (NEURONTIN) 100 MG Cap Take 1-2 tabs po at bedtime as needed for pain 60 Capsule 1    rosuvastatin (CRESTOR) 10 MG Tab Take 1 Tablet by mouth every day for 360 days. 90 Tablet 3    losartan-hydrochlorothiazide (HYZAAR) 100-25 MG per tablet Take 1 Tablet by mouth every day for 360 days. 90 Tablet 3    finasteride (PROSCAR) 5 MG Tab Take 1 Tablet by mouth every day for 360 days. 90 Tablet 3    ketoconazole (NIZORAL) 2 % Cream ketoconazole 2 % topical cream   APPLY CREAM TOPICALLY TO AFFECTED AREA ON THE SKIN TWICE DAILY      fexofenadine (ALLEGRA) 60 MG Tab Take 60 mg by mouth every day.      multivitamin (THERAGRAN) Tab Take 1 Tab by mouth every day.      fluticasone (FLONASE) 50 MCG/ACT nasal spray Spray 1 Spray in nose every day.       No current facility-administered medications on file prior to visit.         EXAMINATION     Physical Exam:   BP (!) 146/60 (BP Location: Right arm, Patient Position: Sitting, BP Cuff Size: Large adult)    "Pulse 62   Temp 36.3 °C (97.3 °F) (Temporal)   Ht 1.803 m (5' 11\")   Wt (!) 129 kg (284 lb 9.8 oz)   SpO2 95%     Constitutional:   Body Habitus: Body mass index is 39.7 kg/m².  Cooperation: Fully cooperates with exam  Appearance: Well-groomed, well-nourished.    Eyes: No scleral icterus to suggest severe liver disease, no proptosis to suggest severe hyperthyroidism    ENT -no obvious auditory deficits, no noticeable facial droop     Skin -no rashes or lesions noted     Respiratory-  breathing comfortably on room air, no audible wheezing    Cardiovascular-distal extremities warm and well perfused.  No lower extremity edema is noted.     Gastrointestinal - no obvious abdominal masses, non-distended    Psychiatric- alert and oriented ×3. Normal affect.     Gait - normal gait, no use of ambulatory device, nonantalgic.     Musculoskeletal and Neuro -       Cervical spine   Inspection: No deformities of the skin over the cervical spine. No rashes or lesions.    limited active range of motion in all directions    Spurling's sign  negative bilaterally  Cervical facet loading maneuver  negative bilaterally    No signs of muscular atrophy in bilateral upper extremities     Tenderness to palpation at paracervical muscles bilaterally, cervical facets bilaterally, and upper trapezius bilaterally. No tenderness to palpation elsewhere including midline of cervical spine.      Key points for the international standards for neurological classification of spinal cord injury (ISNCSCI) to light touch.     Dermatome R L   C4 2 2   C5 2 2   C6 1 1   C7 1 1   C8 1 1   T1 2 2   T2 2 2       Motor Exam Upper Extremities   ? Myotome R L   Shoulder abduction C5 5 5   Elbow flexion C5 5 5   Wrist extension C6 5 5   Elbow extension C7 5 5   Finger flexion C8 5 5   Finger abduction T1 5 5     Reflexes  ?  R L   Biceps  1+ 1+   Brachioradialis  1+ 1+     March's sign negative bilaterally     Bilateral hands:   Inspection: No swelling, " deformities, or rashes. Symmetric appearing thenar and hyperthenar regions bilaterally.  Palpation: no significant tenderness to palpation throughout the bilateral hands  Range of motion is within normal limits throughout bilateral hands, fingers and wrist.    Special tests:  Tinel's at the wrist over the median nerve positive on left, negative on right  Carpal tunnel compression: negative bilaterally  Phalen's test: positive on left, negative on right  Tinel's at the cubital tunnel: negative bilaterally        Thoracic/Lumbar Spine/Sacral Spine/Hips   Focal tenderness to palpation at left knee medial joint line, just inferior to left knee medial joint line, and supra patellar facet.  Full active range of motion with knee flexion and extension.  No tenderness to palpation elsewhere in left knee.    Facet loading maneuver positive bilaterally, worse on left     Palpation:   Tenderness to palpation over the bilateral lower lumbar facets. No tenderness to palpation elsewhere in the low back/hips including midline of lumbosacral spine, paraspinal muscles bilaterally, sacroiliac joints bilaterally, PSIS bilaterally, and greater trochanters bilaterally.    Inspection: No evidence of atrophy in bilateral lower extremities throughout        Key points for the international standards for neurological classification of spinal cord injury (ISNCSCI) to light touch.   Dermatome R L   L2 2 2   L3 2 2   L4 2 2   L5 2 2   S1 2 2   S2 2 2         Motor Exam Lower Extremities  ? Myotome R L   Hip flexion L2 5 5   Knee extension L3 5 5   Ankle dorsiflexion L4 5 5   Toe extension L5 5 5   Ankle plantarflexion S1 5 5        Previous exam    Lumbar spine /hip provocative exam maneuvers  Straight leg raise negative bilaterally  FADIR test negative bilaterally     SI joint tests  ASYA test negative bilaterally  Thigh thrust test negative bilaterally    Cervical spine   Inspection: No deformities of the skin over the cervical spine. No  rashes or lesions.     Spurling's sign  negative bilaterally        Key points for the international standards for neurological classification of spinal cord injury (ISNCSCI) to light touch.      Dermatome R L   C4 2 2   C5 2 2   C6 2 2   C7 2 2   C8 2 2   T1 2 2   T2 2 2         Motor Exam Upper Extremities   ? Myotome R L   Shoulder abduction C5 5 5   Elbow flexion C5 5 5   Wrist extension C6 5 5   Elbow extension C7 5 5   Finger flexion C8 5 5   Finger abduction T1 5 5      Reflexes  ?   R L   Biceps   2+ 2+   Brachioradialis   2+ 2+      March's sign negative bilaterally       Bilateral hands:   Inspection: No swelling, deformities, or rashes. Symmetric appearing thenar and hyperthenar regions bilaterally.  Palpation: no significant tenderness to palpation throughout the bilateral hands  Range of motion is within normal limits throughout bilateral hands, fingers and wrist.     Special tests:  Tinel's at the wrist over the median nerve negative bilaterally  Carpal tunnel compression: negative bilaterally  Phalen's test: negative bilaterally  Tinel's at the cubital tunnel: negative bilaterally    Slump-sit test negative bilaterally         Reflexes  ?   R L   Patella   2+ 2+   Achilles    2+ 2+      Clonus of the ankle negative bilaterally      Heel walking and toe walking intact.         MEDICAL DECISION MAKING    Medical records review: see under HPI section.     DATA    Labs: No new labs available for review since last visit.   Lab Results   Component Value Date/Time    SODIUM 136 12/08/2022 10:57 AM    POTASSIUM 4.0 12/08/2022 10:57 AM    CHLORIDE 98 12/08/2022 10:57 AM    CO2 24 12/08/2022 10:57 AM    ANION 14.0 12/08/2022 10:57 AM    GLUCOSE 120 (H) 12/08/2022 10:57 AM    BUN 16 12/08/2022 10:57 AM    CREATININE 0.87 12/08/2022 10:57 AM    CALCIUM 10.0 12/08/2022 10:57 AM    ASTSGOT 56 (H) 12/08/2022 10:57 AM    ALTSGPT 80 (H) 12/08/2022 10:57 AM    TBILIRUBIN 0.6 12/08/2022 10:57 AM    ALBUMIN 4.6  12/08/2022 10:57 AM    TOTPROTEIN 7.3 12/08/2022 10:57 AM    GLOBULIN 2.7 12/08/2022 10:57 AM    AGRATIO 1.7 12/08/2022 10:57 AM       No results found for: PROTHROMBTM, INR     Lab Results   Component Value Date/Time    WBC 8.9 08/14/2016 10:50 AM    RBC 5.13 08/14/2016 10:50 AM    HEMOGLOBIN 15.7 08/14/2016 10:50 AM    HEMATOCRIT 45.6 08/14/2016 10:50 AM    MCV 88.9 08/14/2016 10:50 AM    MCH 30.5 08/14/2016 10:50 AM    MCHC 34.3 08/14/2016 10:50 AM    MPV 7.9 08/14/2016 10:50 AM    NEUTSPOLYS 54.6 12/14/2012 08:52 AM    LYMPHOCYTES 32.8 12/14/2012 08:52 AM    MONOCYTES 10.0 (H) 12/14/2012 08:52 AM    EOSINOPHILS 2.3 12/14/2012 08:52 AM    BASOPHILS 0.3 12/14/2012 08:52 AM        Lab Results   Component Value Date/Time    HBA1C 6.2 (H) 08/05/2022 10:03 AM        Imaging:   I personally reviewed following images, these are my reads  MRI lumbar spine 11/11/22  Disc bulge most notable at L2-3, L3-4, and L4-5. Severe left  and mild right neuroforaminal stenosis at L4-5. Mild right neuroforaminal stenosis at L5-S1. Moderate bilateral lateral recess narrowing narrowing and moderate right foraminal narrowing at L3-4. See formal radiology report for further details.    X-ray left knee 11/26/2022  Patella fermin.  Quadriceps tendon enthesophyte.  Mild osteoarthritis at medial compartment.     XR left knee 1/7/2023  Mild tricompartmental osteoarthritis.  Appears slightly worse at medial compartment.  Alignment intact. See formal radiology report for further details.    IMAGING radiology reads. I reviewed the following radiology reads   X-ray left knee 1/7/2023  FINDINGS:  Bone density is normal. There is no evidence of fracture or dislocation. There is tricompartment osteoarthritis characterized by osteophytic spurring. There is a small joint effusion.     IMPRESSION:     Mild tricompartment osteoarthritis.                         Results for orders placed in visit on 11/11/22     MR-LUMBAR SPINE-W/O     Impression  Mild  lumbar spine degenerative changes. Type I marrow changes are noted to the adjacent L3-L4 endplates.     There is moderate right-sided foraminal narrowing at the L3-4 level with impingement upon the exiting right L3 nerve     Severe left foraminal narrowing at L4-5 with L4 nerve impingement.     Partially visualized is a mass involving the right renal upper pole. Recommend additional evaluation with a CT of the abdomen and pelvis with contrast, renal protocol.     These unexpected findings were communicated to the ordering provider by Epic inbasket message at 5:14 PM on 2022.               X-ray left knee 2022  FINDINGS:  No acute fracture or dislocation.     Mild osteoarthritis     Small knee joint effusion.     IMPRESSION:        1. No acute osseous abnormality.    Diagnosis  Visit Diagnoses     ICD-10-CM   1. Chronic pain of left knee  M25.562    G89.29   2. Primary osteoarthritis of left knee  M17.12   3. Lumbar spondylosis  M47.816   4. Cervicalgia  M54.2   5. Numbness and tingling in left arm  R20.0    R20.2   6. Numbness and tingling of right arm  R20.0    R20.2   7. BMI 39.0-39.9,adult  Z68.39             ASSESSMENT AND PLAN:  Andrei Galeas (: 1945) is a male with left low back and left knee and medial calf pain that appears to have component of left lumbar radiculitis from severe left neuroforaminal stenosis at L4-5, with ongoing resolution of radiating calf pain after epidural.  Now with significant left knee pain that appears to be mediated by osteoarthritis.    Also with exam today notable for tenderness to palpation at bilateral lower lumbar facet joints with exam notable for positive lumbar facet loading maneuver bilaterally reproducing the patient's pain, suggestive of lumbar facetogenic pain.  Imaging shows lumbar spondylosis at bilateral lower lumbar levels.    Also with pain radiating from neck down right arm along with numbness and tingling in bilateral hands.   Possible cervical radiculitis.  Possible component of carpal tunnel syndrome on left.       Andrei was seen today for follow-up.    Diagnoses and all orders for this visit:    Chronic pain of left knee  -     Consent for all Surgical, Special Diagnostic or Therapeutic Procedures    Primary osteoarthritis of left knee  -     Consent for all Surgical, Special Diagnostic or Therapeutic Procedures    Lumbar spondylosis  -     Referral to Pain Clinic    Cervicalgia  -     DX-CERVICAL SPINE-2 OR 3 VIEWS; Future  -     MR-CERVICAL SPINE-W/O; Future    Numbness and tingling in left arm  -     DX-CERVICAL SPINE-2 OR 3 VIEWS; Future  -     MR-CERVICAL SPINE-W/O; Future    Numbness and tingling of right arm  -     DX-CERVICAL SPINE-2 OR 3 VIEWS; Future  -     MR-CERVICAL SPINE-W/O; Future    BMI 39.0-39.9,adult  -     Patient identified as having weight management issue.  Appropriate orders and counseling given.    Other orders  -     hylan (SYNVISC-ONE) 48 MG/6ML injection 48 mg          PLAN  Physical Therapy:  Discussed my recommendation of the patient pursue physical therapy to maximize the likelihood of long-term pain relief for both his knee and back.  I gave him a handout of physical therapy exercises for low back pain at a previous visit and a handout of physical therapy exercises for neck pain today.  He has declined formal physical therapy referral noting that he would like to focus on injections first     Diagnostic workup:   -Order x-ray cervical spine and MRI cervical spine to assess for possible cause of the pain and suspected cervical radiculitis    Medications:   - continue OTC tylenol, OTC ibuprofen  -Discussed that he could consider continuing gabapentin per PCP discretion.  He does not feel that it is helping.    Interventions:   - interlaminar left L5-S1 epidural steroid injection PRN if radiating pain returns  -Synvisc One injection for left knee under ultrasound guidance today given inadequate relief with  left knee steroid injections x 2.  The risks, benefits, and alternatives to this procedure were discussed and the patient wishes to proceed with the procedure. Risks include but are not limited to damage to surrounding structures, infection, bleeding, worsening of pain which can be permanent, and weakness which can be permanent. Benefits include pain relief and improved function. Alternatives include not doing the procedure.    - diagnostic lumbar medial branch blocks targeting Bilateral L4-L5 and L5-S1 facet joints. The risks, benefits, and alternatives to this procedure were discussed and the patient wishes to proceed with the procedure. Risks include but are not limited to damage to surrounding structures, infection, bleeding, worsening of pain which can be permanent, and weakness which can be permanent. Benefits include pain relief and improved function. Alternatives include not doing the procedure.      Follow-up: 1 week after [facility]    Orders Placed This Encounter    DX-CERVICAL SPINE-2 OR 3 VIEWS    MR-CERVICAL SPINE-W/O    Referral to Pain Clinic    Patient identified as having weight management issue.  Appropriate orders and counseling given.    hylan (SYNVISC-ONE) 48 MG/6ML injection 48 mg    Consent for all Surgical, Special Diagnostic or Therapeutic Procedures       Claudia Cervantes MD  Interventional Pain and Spine  Physical Medicine and Rehabilitation  [facility]      The above note documents my personal evaluation of this patient. In addition, I have reviewed and confirmed with the patient and MA the supportive information documented in today's Patient Health Questionnaire and Office Note.     Please note that this dictation was created using voice recognition software. I have made every reasonable attempt to correct obvious errors, but I expect that there are errors of grammar and possibly content that I did not discover before finalizing the note.

## 2023-05-05 NOTE — PROGRESS NOTES
Follow-up patient Note    Interventional Pain and Spine  Physiatry (Physical Medicine and Rehabilitation)     Patient Name: Andrei Galeas  : 1945  Date of service: 2023    Chief Complaint:   Chief Complaint   Patient presents with    Follow-Up     Chronic pain of left knee       HISTORY  Please see new patient note by Dr. Cervantes for more details.     HPI  Today's visit   Andrei Galeas ( 1945) is a RHD male with Diagnoses of Chronic pain of left knee, Primary osteoarthritis of left knee, Lumbar spondylosis, Cervicalgia, Numbness and tingling in left arm, Numbness and tingling of right arm, and BMI 39.0-39.9,adult were pertinent to this visit.    Today Don presents for left knee Synvisc One injection.  Notes ongoing left knee pain, slightly improved after steroid injections but still significantly bothersome.  Pain severity in knee rates 3/10 on NRS today.    Also notes ongoing bilateral dull achy back pain that does not radiate.  Worse with standing for more than 10 to 15 minutes.    He also reports pain at his bilateral shoulders that interferes with his ability to sleep.  It typically does not bother him much during the day.  He endorses radiation of pain from his right neck down his right arm along with impaired sensation in his bilateral hands.  Denies known history of carpal tunnel syndrome or cervical radiculopathy.  Notes that when he was young he had recurrent shoulder subluxations and has had limited range of motion in the shoulders on a chronic basis.  Denies focal weakness in arm.    Taking tylenol or advil OTC for pain with no significant relief.  Has taken gabapentin 100-200mg QHS with no noticeable relief and no known unwanted SE.     Procedure history:  - 22 Lumbar Epidural Steroid Injection at the left L5-S1 level - 100% pain relief x 1 month, then started wearing off 1 week ago, now 20-30% better.  Denies radiating pain in legs at follow-up on 3/17/2023.  -  23 left knee steroid injection - 3 days of significant relief  - 3/24/23 left knee steroid injection - 3 days of significant relief  - 23 Left knee Synvisc One injection      ROS:   Red Flags ROS:   Fever, Chills, Sweats: Denies  Involuntary Weight Loss: Denies  Bladder Incontinence: Denies  Bowel Incontinence: denies  Saddle Anesthesia: Denies    All other systems reviewed and negative.     PMHx:   Past Medical History:   Diagnosis Date    Arthritis     Back pain     Cataract     Depression     Diabetes ([facility])     borderline    Hypertension     Obstructive chronic bronchitis without exacerbation ([facility]) 2022    Shortness of breath     Sinusitis     Sleep apnea     Wheezing        PSHx:   Past Surgical History:   Procedure Laterality Date    CO INJ LUMBAR/SACRAL,W/ IMAGING Left 2022    Procedure: LEFT lumbar L5-S1 interlaminar epidural steroid injection.;  Surgeon: Claudia Cervantes M.D.;  [facility];  Service: Pain Management    SINUSOTOMIES         Family Hx:   Family History   Problem Relation Age of Onset    Heart Disease Paternal Uncle         MI    Arthritis Mother     Psychiatric Illness Father         stomach cancer    Hypertension Father     Hyperlipidemia Father     Stroke Father        Social Hx:  Social History     Socioeconomic History    Marital status:      Spouse name: Not on file    Number of children: Not on file    Years of education: Not on file    Highest education level: Not on file   Occupational History    Not on file   Tobacco Use    Smoking status: Former     Packs/day: 2.00     Years: 10.00     Pack years: 20.00     Types: Cigarettes     Quit date: 1977     Years since quittin.6    Smokeless tobacco: Never   Vaping Use    Vaping Use: Never used   Substance and Sexual Activity    Alcohol use: Yes     Comment: 1-2 drinks every week or two    Drug use: No    Sexual activity: Not on file   Other Topics Concern    Not on file    Social History Narrative    Not on file     Social Determinants of Health     Financial Resource Strain: Not on file   Food Insecurity: No Food Insecurity    Worried About Running Out of Food in the Last Year: Never true    Ran Out of Food in the Last Year: Never true   Transportation Needs: No Transportation Needs    Lack of Transportation (Medical): No    Lack of Transportation (Non-Medical): No   Physical Activity: Inactive    Days of Exercise per Week: 0 days    Minutes of Exercise per Session: 0 min   Stress: Stress Concern Present    Feeling of Stress : To some extent   Social Connections: Socially Integrated    Frequency of Communication with Friends and Family: Three times a week    Frequency of Social Gatherings with Friends and Family: Once a week    Attends Congregation Services: More than 4 times per year    Active Member of Clubs or Organizations: Yes    Attends Club or Organization Meetings: More than 4 times per year    Marital Status:    Intimate Partner Violence: Not on file   Housing Stability: Low Risk     Unable to Pay for Housing in the Last Year: No    Number of Places Lived in the Last Year: 1    Unstable Housing in the Last Year: No       Allergies:  Allergies   Allergen Reactions    Seasonal     Penicillins Unspecified     Childhood - unknown reaction       Medications: reviewed on epic.   Outpatient Medications Marked as Taking for the 5/5/23 encounter (Office Visit) with Claudia Cervantes M.D.   Medication Sig Dispense Refill    diphenhydrAMINE-APAP, sleep, (TYLENOL PM EXTRA STRENGTH)  MG Tab Take 1 Tablet by mouth at bedtime as needed.      Potassium 99 MG Tab Take 0.5 Tablets by mouth every day.      tamsulosin (FLOMAX) 0.4 MG capsule TAKE 2 CAPSULES BY MOUTH ONCE DAILY AS DIRECTED AFTER A MEAL IN THE EVENING      gabapentin (NEURONTIN) 100 MG Cap Take 1-2 tabs po at bedtime as needed for pain 60 Capsule 1    rosuvastatin (CRESTOR) 10 MG Tab Take 1 Tablet by mouth every day for  360 days. 90 Tablet 3    losartan-hydrochlorothiazide (HYZAAR) 100-25 MG per tablet Take 1 Tablet by mouth every day for 360 days. 90 Tablet 3    finasteride (PROSCAR) 5 MG Tab Take 1 Tablet by mouth every day for 360 days. 90 Tablet 3    ketoconazole (NIZORAL) 2 % Cream ketoconazole 2 % topical cream   APPLY CREAM TOPICALLY TO AFFECTED AREA ON THE SKIN TWICE DAILY      fexofenadine (ALLEGRA) 60 MG Tab Take 60 mg by mouth every day.      multivitamin (THERAGRAN) Tab Take 1 Tab by mouth every day.      fluticasone (FLONASE) 50 MCG/ACT nasal spray Spray 1 Spray in nose every day.          Current Outpatient Medications on File Prior to Visit   Medication Sig Dispense Refill    diphenhydrAMINE-APAP, sleep, (TYLENOL PM EXTRA STRENGTH)  MG Tab Take 1 Tablet by mouth at bedtime as needed.      Potassium 99 MG Tab Take 0.5 Tablets by mouth every day.      tamsulosin (FLOMAX) 0.4 MG capsule TAKE 2 CAPSULES BY MOUTH ONCE DAILY AS DIRECTED AFTER A MEAL IN THE EVENING      gabapentin (NEURONTIN) 100 MG Cap Take 1-2 tabs po at bedtime as needed for pain 60 Capsule 1    rosuvastatin (CRESTOR) 10 MG Tab Take 1 Tablet by mouth every day for 360 days. 90 Tablet 3    losartan-hydrochlorothiazide (HYZAAR) 100-25 MG per tablet Take 1 Tablet by mouth every day for 360 days. 90 Tablet 3    finasteride (PROSCAR) 5 MG Tab Take 1 Tablet by mouth every day for 360 days. 90 Tablet 3    ketoconazole (NIZORAL) 2 % Cream ketoconazole 2 % topical cream   APPLY CREAM TOPICALLY TO AFFECTED AREA ON THE SKIN TWICE DAILY      fexofenadine (ALLEGRA) 60 MG Tab Take 60 mg by mouth every day.      multivitamin (THERAGRAN) Tab Take 1 Tab by mouth every day.      fluticasone (FLONASE) 50 MCG/ACT nasal spray Spray 1 Spray in nose every day.       No current facility-administered medications on file prior to visit.         EXAMINATION     Physical Exam:   BP (!) 146/60 (BP Location: Right arm, Patient Position: Sitting, BP Cuff Size: Large adult)    "Pulse 62   Temp 36.3 °C (97.3 °F) (Temporal)   Ht 1.803 m (5' 11\")   Wt (!) 129 kg (284 lb 9.8 oz)   SpO2 95%     Constitutional:   Body Habitus: Body mass index is 39.7 kg/m².  Cooperation: Fully cooperates with exam  Appearance: Well-groomed, well-nourished.    Eyes: No scleral icterus to suggest severe liver disease, no proptosis to suggest severe hyperthyroidism    ENT -no obvious auditory deficits, no noticeable facial droop     Skin -no rashes or lesions noted     Respiratory-  breathing comfortably on room air, no audible wheezing    Cardiovascular-distal extremities warm and well perfused.  No lower extremity edema is noted.     Gastrointestinal - no obvious abdominal masses, non-distended    Psychiatric- alert and oriented ×3. Normal affect.     Gait - normal gait, no use of ambulatory device, nonantalgic.     Musculoskeletal and Neuro -       Cervical spine   Inspection: No deformities of the skin over the cervical spine. No rashes or lesions.    limited active range of motion in all directions    Spurling's sign  negative bilaterally  Cervical facet loading maneuver  negative bilaterally    No signs of muscular atrophy in bilateral upper extremities     Tenderness to palpation at paracervical muscles bilaterally, cervical facets bilaterally, and upper trapezius bilaterally. No tenderness to palpation elsewhere including midline of cervical spine.      Key points for the international standards for neurological classification of spinal cord injury (ISNCSCI) to light touch.     Dermatome R L   C4 2 2   C5 2 2   C6 1 1   C7 1 1   C8 1 1   T1 2 2   T2 2 2       Motor Exam Upper Extremities   ? Myotome R L   Shoulder abduction C5 5 5   Elbow flexion C5 5 5   Wrist extension C6 5 5   Elbow extension C7 5 5   Finger flexion C8 5 5   Finger abduction T1 5 5     Reflexes  ?  R L   Biceps  1+ 1+   Brachioradialis  1+ 1+     March's sign negative bilaterally     Bilateral hands:   Inspection: No swelling, " deformities, or rashes. Symmetric appearing thenar and hyperthenar regions bilaterally.  Palpation: no significant tenderness to palpation throughout the bilateral hands  Range of motion is within normal limits throughout bilateral hands, fingers and wrist.    Special tests:  Tinel's at the wrist over the median nerve positive on left, negative on right  Carpal tunnel compression: negative bilaterally  Phalen's test: positive on left, negative on right  Tinel's at the cubital tunnel: negative bilaterally        Thoracic/Lumbar Spine/Sacral Spine/Hips   Focal tenderness to palpation at left knee medial joint line, just inferior to left knee medial joint line, and supra patellar facet.  Full active range of motion with knee flexion and extension.  No tenderness to palpation elsewhere in left knee.    Facet loading maneuver positive bilaterally, worse on left     Palpation:   Tenderness to palpation over the bilateral lower lumbar facets. No tenderness to palpation elsewhere in the low back/hips including midline of lumbosacral spine, paraspinal muscles bilaterally, sacroiliac joints bilaterally, PSIS bilaterally, and greater trochanters bilaterally.    Inspection: No evidence of atrophy in bilateral lower extremities throughout        Key points for the international standards for neurological classification of spinal cord injury (ISNCSCI) to light touch.   Dermatome R L   L2 2 2   L3 2 2   L4 2 2   L5 2 2   S1 2 2   S2 2 2         Motor Exam Lower Extremities  ? Myotome R L   Hip flexion L2 5 5   Knee extension L3 5 5   Ankle dorsiflexion L4 5 5   Toe extension L5 5 5   Ankle plantarflexion S1 5 5        Previous exam    Lumbar spine /hip provocative exam maneuvers  Straight leg raise negative bilaterally  FADIR test negative bilaterally     SI joint tests  ASYA test negative bilaterally  Thigh thrust test negative bilaterally    Cervical spine   Inspection: No deformities of the skin over the cervical spine. No  rashes or lesions.     Spurling's sign  negative bilaterally        Key points for the international standards for neurological classification of spinal cord injury (ISNCSCI) to light touch.      Dermatome R L   C4 2 2   C5 2 2   C6 2 2   C7 2 2   C8 2 2   T1 2 2   T2 2 2         Motor Exam Upper Extremities   ? Myotome R L   Shoulder abduction C5 5 5   Elbow flexion C5 5 5   Wrist extension C6 5 5   Elbow extension C7 5 5   Finger flexion C8 5 5   Finger abduction T1 5 5      Reflexes  ?   R L   Biceps   2+ 2+   Brachioradialis   2+ 2+      March's sign negative bilaterally       Bilateral hands:   Inspection: No swelling, deformities, or rashes. Symmetric appearing thenar and hyperthenar regions bilaterally.  Palpation: no significant tenderness to palpation throughout the bilateral hands  Range of motion is within normal limits throughout bilateral hands, fingers and wrist.     Special tests:  Tinel's at the wrist over the median nerve negative bilaterally  Carpal tunnel compression: negative bilaterally  Phalen's test: negative bilaterally  Tinel's at the cubital tunnel: negative bilaterally    Slump-sit test negative bilaterally         Reflexes  ?   R L   Patella   2+ 2+   Achilles    2+ 2+      Clonus of the ankle negative bilaterally      Heel walking and toe walking intact.         MEDICAL DECISION MAKING    Medical records review: see under HPI section.     DATA    Labs: No new labs available for review since last visit.   Lab Results   Component Value Date/Time    SODIUM 136 12/08/2022 10:57 AM    POTASSIUM 4.0 12/08/2022 10:57 AM    CHLORIDE 98 12/08/2022 10:57 AM    CO2 24 12/08/2022 10:57 AM    ANION 14.0 12/08/2022 10:57 AM    GLUCOSE 120 (H) 12/08/2022 10:57 AM    BUN 16 12/08/2022 10:57 AM    CREATININE 0.87 12/08/2022 10:57 AM    CALCIUM 10.0 12/08/2022 10:57 AM    ASTSGOT 56 (H) 12/08/2022 10:57 AM    ALTSGPT 80 (H) 12/08/2022 10:57 AM    TBILIRUBIN 0.6 12/08/2022 10:57 AM    ALBUMIN 4.6  12/08/2022 10:57 AM    TOTPROTEIN 7.3 12/08/2022 10:57 AM    GLOBULIN 2.7 12/08/2022 10:57 AM    AGRATIO 1.7 12/08/2022 10:57 AM       No results found for: PROTHROMBTM, INR     Lab Results   Component Value Date/Time    WBC 8.9 08/14/2016 10:50 AM    RBC 5.13 08/14/2016 10:50 AM    HEMOGLOBIN 15.7 08/14/2016 10:50 AM    HEMATOCRIT 45.6 08/14/2016 10:50 AM    MCV 88.9 08/14/2016 10:50 AM    MCH 30.5 08/14/2016 10:50 AM    MCHC 34.3 08/14/2016 10:50 AM    MPV 7.9 08/14/2016 10:50 AM    NEUTSPOLYS 54.6 12/14/2012 08:52 AM    LYMPHOCYTES 32.8 12/14/2012 08:52 AM    MONOCYTES 10.0 (H) 12/14/2012 08:52 AM    EOSINOPHILS 2.3 12/14/2012 08:52 AM    BASOPHILS 0.3 12/14/2012 08:52 AM        Lab Results   Component Value Date/Time    HBA1C 6.2 (H) 08/05/2022 10:03 AM        Imaging:   I personally reviewed following images, these are my reads  MRI lumbar spine 11/11/22  Disc bulge most notable at L2-3, L3-4, and L4-5. Severe left  and mild right neuroforaminal stenosis at L4-5. Mild right neuroforaminal stenosis at L5-S1. Moderate bilateral lateral recess narrowing narrowing and moderate right foraminal narrowing at L3-4. See formal radiology report for further details.    X-ray left knee 11/26/2022  Patella fermin.  Quadriceps tendon enthesophyte.  Mild osteoarthritis at medial compartment.     XR left knee 1/7/2023  Mild tricompartmental osteoarthritis.  Appears slightly worse at medial compartment.  Alignment intact. See formal radiology report for further details.    IMAGING radiology reads. I reviewed the following radiology reads   X-ray left knee 1/7/2023  FINDINGS:  Bone density is normal. There is no evidence of fracture or dislocation. There is tricompartment osteoarthritis characterized by osteophytic spurring. There is a small joint effusion.     IMPRESSION:     Mild tricompartment osteoarthritis.                         Results for orders placed in visit on 11/11/22     MR-LUMBAR SPINE-W/O     Impression  Mild  lumbar spine degenerative changes. Type I marrow changes are noted to the adjacent L3-L4 endplates.     There is moderate right-sided foraminal narrowing at the L3-4 level with impingement upon the exiting right L3 nerve     Severe left foraminal narrowing at L4-5 with L4 nerve impingement.     Partially visualized is a mass involving the right renal upper pole. Recommend additional evaluation with a CT of the abdomen and pelvis with contrast, renal protocol.     These unexpected findings were communicated to the ordering provider by Epic inbasket message at 5:14 PM on 2022.               X-ray left knee 2022  FINDINGS:  No acute fracture or dislocation.     Mild osteoarthritis     Small knee joint effusion.     IMPRESSION:        1. No acute osseous abnormality.    Diagnosis  Visit Diagnoses     ICD-10-CM   1. Chronic pain of left knee  M25.562    G89.29   2. Primary osteoarthritis of left knee  M17.12   3. Lumbar spondylosis  M47.816   4. Cervicalgia  M54.2   5. Numbness and tingling in left arm  R20.0    R20.2   6. Numbness and tingling of right arm  R20.0    R20.2   7. BMI 39.0-39.9,adult  Z68.39             ASSESSMENT AND PLAN:  Andrei Galeas (: 1945) is a male with left low back and left knee and medial calf pain that appears to have component of left lumbar radiculitis from severe left neuroforaminal stenosis at L4-5, with ongoing resolution of radiating calf pain after epidural.  Now with significant left knee pain that appears to be mediated by osteoarthritis.    Also with exam today notable for tenderness to palpation at bilateral lower lumbar facet joints with exam notable for positive lumbar facet loading maneuver bilaterally reproducing the patient's pain, suggestive of lumbar facetogenic pain.  Imaging shows lumbar spondylosis at bilateral lower lumbar levels.    Also with pain radiating from neck down right arm along with numbness and tingling in bilateral hands.   Possible cervical radiculitis.  Possible component of carpal tunnel syndrome on left.       Andrei was seen today for follow-up.    Diagnoses and all orders for this visit:    Chronic pain of left knee  -     Consent for all Surgical, Special Diagnostic or Therapeutic Procedures    Primary osteoarthritis of left knee  -     Consent for all Surgical, Special Diagnostic or Therapeutic Procedures    Lumbar spondylosis  -     Referral to Pain Clinic    Cervicalgia  -     DX-CERVICAL SPINE-2 OR 3 VIEWS; Future  -     MR-CERVICAL SPINE-W/O; Future    Numbness and tingling in left arm  -     DX-CERVICAL SPINE-2 OR 3 VIEWS; Future  -     MR-CERVICAL SPINE-W/O; Future    Numbness and tingling of right arm  -     DX-CERVICAL SPINE-2 OR 3 VIEWS; Future  -     MR-CERVICAL SPINE-W/O; Future    BMI 39.0-39.9,adult  -     Patient identified as having weight management issue.  Appropriate orders and counseling given.    Other orders  -     hylan (SYNVISC-ONE) 48 MG/6ML injection 48 mg          PLAN  Physical Therapy:  Discussed my recommendation of the patient pursue physical therapy to maximize the likelihood of long-term pain relief for both his knee and back.  I gave him a handout of physical therapy exercises for low back pain at a previous visit and a handout of physical therapy exercises for neck pain today.  He has declined formal physical therapy referral noting that he would like to focus on injections first     Diagnostic workup:   -Order x-ray cervical spine and MRI cervical spine to assess for possible cause of the pain and suspected cervical radiculitis    Medications:   - continue OTC tylenol, OTC ibuprofen  -Discussed that he could consider continuing gabapentin per PCP discretion.  He does not feel that it is helping.    Interventions:   - interlaminar left L5-S1 epidural steroid injection PRN if radiating pain returns  -Synvisc One injection for left knee under ultrasound guidance today given inadequate relief with  left knee steroid injections x 2.  The risks, benefits, and alternatives to this procedure were discussed and the patient wishes to proceed with the procedure. Risks include but are not limited to damage to surrounding structures, infection, bleeding, worsening of pain which can be permanent, and weakness which can be permanent. Benefits include pain relief and improved function. Alternatives include not doing the procedure.    - diagnostic lumbar medial branch blocks targeting Bilateral L4-L5 and L5-S1 facet joints. The risks, benefits, and alternatives to this procedure were discussed and the patient wishes to proceed with the procedure. Risks include but are not limited to damage to surrounding structures, infection, bleeding, worsening of pain which can be permanent, and weakness which can be permanent. Benefits include pain relief and improved function. Alternatives include not doing the procedure.      Follow-up: 1 week after [facility]    Orders Placed This Encounter    DX-CERVICAL SPINE-2 OR 3 VIEWS    MR-CERVICAL SPINE-W/O    Referral to Pain Clinic    Patient identified as having weight management issue.  Appropriate orders and counseling given.    hylan (SYNVISC-ONE) 48 MG/6ML injection 48 mg    Consent for all Surgical, Special Diagnostic or Therapeutic Procedures       Claudia Cervantes MD  Interventional Pain and Spine  Physical Medicine and Rehabilitation  [facility]      The above note documents my personal evaluation of this patient. In addition, I have reviewed and confirmed with the patient and MA the supportive information documented in today's Patient Health Questionnaire and Office Note.     Please note that this dictation was created using voice recognition software. I have made every reasonable attempt to correct obvious errors, but I expect that there are errors of grammar and possibly content that I did not discover before finalizing the note.

## 2023-05-10 ENCOUNTER — APPOINTMENT (OUTPATIENT)
Dept: RADIOLOGY | Facility: MEDICAL CENTER | Age: 78
End: 2023-05-10
Attending: STUDENT IN AN ORGANIZED HEALTH CARE EDUCATION/TRAINING PROGRAM
Payer: MEDICARE

## 2023-05-10 DIAGNOSIS — R20.2 NUMBNESS AND TINGLING OF RIGHT ARM: ICD-10-CM

## 2023-05-10 DIAGNOSIS — R20.0 NUMBNESS AND TINGLING IN LEFT ARM: ICD-10-CM

## 2023-05-10 DIAGNOSIS — R20.0 NUMBNESS AND TINGLING OF RIGHT ARM: ICD-10-CM

## 2023-05-10 DIAGNOSIS — R20.2 NUMBNESS AND TINGLING IN LEFT ARM: ICD-10-CM

## 2023-05-10 DIAGNOSIS — M54.2 CERVICALGIA: ICD-10-CM

## 2023-05-10 PROCEDURE — 72141 MRI NECK SPINE W/O DYE: CPT

## 2023-05-16 ENCOUNTER — HOSPITAL ENCOUNTER (OUTPATIENT)
Facility: REHABILITATION | Age: 78
End: 2023-05-16
Attending: STUDENT IN AN ORGANIZED HEALTH CARE EDUCATION/TRAINING PROGRAM | Admitting: STUDENT IN AN ORGANIZED HEALTH CARE EDUCATION/TRAINING PROGRAM
Payer: MEDICARE

## 2023-05-16 ENCOUNTER — APPOINTMENT (OUTPATIENT)
Dept: RADIOLOGY | Facility: REHABILITATION | Age: 78
End: 2023-05-16
Attending: STUDENT IN AN ORGANIZED HEALTH CARE EDUCATION/TRAINING PROGRAM
Payer: MEDICARE

## 2023-05-16 VITALS
SYSTOLIC BLOOD PRESSURE: 150 MMHG | TEMPERATURE: 97.3 F | OXYGEN SATURATION: 97 % | BODY MASS INDEX: 39.51 KG/M2 | HEIGHT: 71 IN | WEIGHT: 282.19 LBS | DIASTOLIC BLOOD PRESSURE: 73 MMHG | HEART RATE: 68 BPM | RESPIRATION RATE: 16 BRPM

## 2023-05-16 PROCEDURE — 700101 HCHG RX REV CODE 250

## 2023-05-16 PROCEDURE — 700117 HCHG RX CONTRAST REV CODE 255

## 2023-05-16 PROCEDURE — 700111 HCHG RX REV CODE 636 W/ 250 OVERRIDE (IP)

## 2023-05-16 PROCEDURE — 64494 INJ PARAVERT F JNT L/S 2 LEV: CPT

## 2023-05-16 PROCEDURE — 64493 INJ PARAVERT F JNT L/S 1 LEV: CPT

## 2023-05-16 RX ORDER — BUPIVACAINE HYDROCHLORIDE 5 MG/ML
INJECTION, SOLUTION EPIDURAL; INTRACAUDAL
Status: COMPLETED
Start: 2023-05-16 | End: 2023-05-16

## 2023-05-16 RX ORDER — LIDOCAINE HYDROCHLORIDE 10 MG/ML
INJECTION, SOLUTION EPIDURAL; INFILTRATION; INTRACAUDAL; PERINEURAL
Status: COMPLETED
Start: 2023-05-16 | End: 2023-05-16

## 2023-05-16 RX ADMIN — IOHEXOL 20 ML: 240 INJECTION, SOLUTION INTRATHECAL; INTRAVASCULAR; INTRAVENOUS; ORAL at 16:38

## 2023-05-16 RX ADMIN — BUPIVACAINE HYDROCHLORIDE 10 ML: 5 INJECTION, SOLUTION EPIDURAL; INTRACAUDAL at 16:39

## 2023-05-16 RX ADMIN — LIDOCAINE HYDROCHLORIDE 10 ML: 10 INJECTION, SOLUTION EPIDURAL; INFILTRATION; INTRACAUDAL at 16:35

## 2023-05-16 ASSESSMENT — PAIN DESCRIPTION - PAIN TYPE
TYPE: CHRONIC PAIN
TYPE: CHRONIC PAIN

## 2023-05-16 NOTE — PROGRESS NOTES
1445 Pt admitted to Pre Procedure area.  Consent signed and post op instructions reviewed with pt, all questions answered.   1545 Dr. Cervantes in to see pt.    1649 Pt received to Post Procedure area with updates from procedure RN.  Snack and drink tolerated without C/O N/V.  Dressing clear, dry, no swelling noted.    1655 Pt seen by Dr. Cervantes for post procedure evaluation.     1700 Pt ambulated without difficulty & meets criteria for DC, accompanied to car and placed in passenger seat by CNA.  Discharged to designated .

## 2023-05-16 NOTE — OP REPORT
"Date of Service: see epic time stamp for DOS    Patient: Andrei Galeas 77 y.o. male     MRN: [id number]     Physician/s: Claudia Cervantes MD    Pre-operative Diagnosis: Lumbosacral spondylosis, facet arthropathy. The patient was NOT seen for lumbar radiculopathy today.     Post-operative Diagnosis: Lumbosacral spondylosis, facet arthropathy. The patient was NOT seen for lumbar radiculopathy today.     Procedure:  diagnostic lumbar medial branch blocks targeting the bilateral L4-L5 and L5-S1 facet joints    Description of procedure:    The risks, benefits, and alternatives of the procedure were reviewed and discussed with the patient.  Written informed consent was freely obtained. A pre-procedural time-out was conducted by the physician verifying patient’s identity, procedure to be performed, procedure site and side, and allergy verification. Appropriate equipment was determined to be in place for the procedure.     The patient's vital signs were carefully monitored before, throughout, and after the procedure.     In the fluoroscopy suite the patient was placed in a prone position and the skin was prepped and draped in the usual sterile fashion. The fluoroscope was placed over the lower back at the appropriate angles, and the targets for injection were marked. A 27g needle was placed into each of the markings at the levels below, and approx 1cc of 1% Lidocaine was injected subcutaneously into the epidermal and dermal layers. The needle was removed intact.     Using an oblique view, A 22g 5\" needle was then placed at the intersection of the transverse process and superior articular process at the L4-5 facet joint where the L3 medial branch runs on the left side. The needle tips were then verified by AP, oblique, and lateral views.     Using an oblique view, A 22g 5\" needle was then placed at the intersection of the transverse process and superior articular process at the L5-S1 facet joint where the L4 medial branch " "runs  on the left side. The needle tips were then verified by AP, oblique, and lateral views.     Using an oblique view, A 22g 5\" needle was then placed at the intersection of the transverse process and superior articular process at the S1 facet where the L5 dorsal ramus runs  on the left side. The needle tips were then verified by AP, oblique, and lateral views.       Using an oblique view, A 22g 5\" needle was then placed at the intersection of the transverse process and superior articular process at the L4-5 facet joint where the L3 medial branch runs on the right side. The needle tips were then verified by AP, oblique, and lateral views.     Using an oblique view, A 22g 5\" needle was then placed at the intersection of the transverse process and superior articular process at the L5-S1 facet joint where the L4 medial branch runs  on the right side. The needle tips were then verified by AP, oblique, and lateral views.     Using an oblique view, A 22g 5\" needle was then placed at the intersection of the transverse process and superior articular process at the S1 facet where the L5 dorsal ramus runs  on the right side. The needle tips were then verified by AP, oblique, and lateral views.       In the AP view, contrast dye was injected. At the left and right L5-S1 facet the contrast flow originally did not follow the path of the medial branch at that level, and the needle was repositioned and subsequent injection of less than 1 cc of contrast dye highlighted the medial branch with the fluoroscope running live at the levels above. Following negative aspiration, approximately 0.5 ml. of  0.5% bupivacaine was then injected at the above levels, and the needles were removed intact after restyleted. The patient's back was covered with a 4x4 gauze, the area was cleansed with sterile normal saline, and a dressing was applied.       There were no complications noted, the patient was hemodynamically stable, and tolerated the " procedure well.     Follow-up as scheduled    Claudia Cervantes MD  Interventional Pain and Spine  Physical Medicine and Rehabilitation  [facility]      CPT  Intraarticular joint or medial branch block (MBB) - lumbar or sacral (1st level):  42635-zq -50  Intraarticular joint or medial branch block (MBB) - lumbar or sacral (2nd level):  92506-ya -50

## 2023-05-16 NOTE — INTERVAL H&P NOTE
H&P reviewed. The patient was examined and there are no changes to the H&P    Claudia Cervantes MD  Interventional Pain and Spine  Physical Medicine and Rehabilitation  [facility]     English

## 2023-05-17 NOTE — PROCEDURES
"Patient Name: Andrei Galeas  : 1945  Date of Service: 2023    Physician/s: Claudia Cervantes MD    Pre-operative Diagnosis: left knee osteoarthritis, pain    Post-operative Diagnosis: left knee osteoarthritis, pain    Procedure: left Knee injection ultrasound-guided with Synvisc-One    Description of procedure:    The risks, benefits, and alternatives of the procedure were reviewed and discussed with the patient.  Risks include damage to surrounding structures, infection, hemorrhage, worsening of pain, swelling of the knee.  Written informed consent was freely obtained. A pre-procedural time-out was conducted by the physician verifying patient’s identity, procedure to be performed, procedure site and side, and allergy verification. Appropriate equipment was determined to be in place for the procedure.     No sedation was used for this procedure.     In the office suite the patient was placed in a supine position, and the knee was prepped and draped in the usual sterile fashion. The ultrasound probe was placed over the left suprapatellar joint recess. The target for injection was marked, and using a 27-gauge 1.5 inch needle, 2 cc of 1% lidocaine were injected into the subcutaneous tissues for local anesthesia.  This needle was then removed intact.   Subsequently a 22-gauge 3.5\" needle was placed into skin and advanced under ultrasound guidance into the joint space. Then, following negative aspiration, 6mL of Synvisc-One was then injected into the joint, and the needle was subsequently removed intact. The patient's knee was wiped with a 4x4 gauze, the area was cleansed with alcohol prep, and a bandaid was applied. There were no complications noted.       Claudia Cervantes MD  Interventional Pain and Spine  Physical Medicine and Rehabilitation  [facility]    "

## 2023-05-24 ENCOUNTER — TELEPHONE (OUTPATIENT)
Dept: PHYSICAL MEDICINE AND REHAB | Facility: MEDICAL CENTER | Age: 78
End: 2023-05-24

## 2023-05-24 ENCOUNTER — OFFICE VISIT (OUTPATIENT)
Dept: PHYSICAL MEDICINE AND REHAB | Facility: MEDICAL CENTER | Age: 78
End: 2023-05-24
Payer: MEDICARE

## 2023-05-24 VITALS
DIASTOLIC BLOOD PRESSURE: 64 MMHG | HEART RATE: 78 BPM | WEIGHT: 283.95 LBS | BODY MASS INDEX: 39.75 KG/M2 | SYSTOLIC BLOOD PRESSURE: 144 MMHG | TEMPERATURE: 97.3 F | HEIGHT: 71 IN

## 2023-05-24 DIAGNOSIS — M25.562 CHRONIC PAIN OF LEFT KNEE: ICD-10-CM

## 2023-05-24 DIAGNOSIS — M17.12 PRIMARY OSTEOARTHRITIS OF LEFT KNEE: ICD-10-CM

## 2023-05-24 DIAGNOSIS — G89.29 CHRONIC PAIN OF LEFT KNEE: ICD-10-CM

## 2023-05-24 DIAGNOSIS — M47.816 LUMBAR SPONDYLOSIS: ICD-10-CM

## 2023-05-24 PROCEDURE — 3077F SYST BP >= 140 MM HG: CPT | Performed by: STUDENT IN AN ORGANIZED HEALTH CARE EDUCATION/TRAINING PROGRAM

## 2023-05-24 PROCEDURE — 99213 OFFICE O/P EST LOW 20 MIN: CPT | Performed by: STUDENT IN AN ORGANIZED HEALTH CARE EDUCATION/TRAINING PROGRAM

## 2023-05-24 PROCEDURE — 1125F AMNT PAIN NOTED PAIN PRSNT: CPT | Performed by: STUDENT IN AN ORGANIZED HEALTH CARE EDUCATION/TRAINING PROGRAM

## 2023-05-24 PROCEDURE — 3078F DIAST BP <80 MM HG: CPT | Performed by: STUDENT IN AN ORGANIZED HEALTH CARE EDUCATION/TRAINING PROGRAM

## 2023-05-24 ASSESSMENT — PATIENT HEALTH QUESTIONNAIRE - PHQ9
SUM OF ALL RESPONSES TO PHQ QUESTIONS 1-9: 11
5. POOR APPETITE OR OVEREATING: 3 - NEARLY EVERY DAY
CLINICAL INTERPRETATION OF PHQ2 SCORE: 2

## 2023-05-24 ASSESSMENT — PAIN SCALES - GENERAL: PAINLEVEL: 6=MODERATE PAIN

## 2023-05-24 NOTE — H&P (VIEW-ONLY)
Follow-up patient Note    Interventional Pain and Spine  Physiatry (Physical Medicine and Rehabilitation)     Patient Name: Andrei Galeas  : 1945  Date of service: 2023    Chief Complaint:   Chief Complaint   Patient presents with    Follow-Up     Chronic pain of left knee       HISTORY  Please see new patient note by Dr. Cervantes for more details.     HPI  Today's visit   Andrei Galeas ( 1945) is a RHD male with Diagnoses of Chronic pain of left knee, Primary osteoarthritis of left knee, and Lumbar spondylosis were pertinent to this visit.    Today Clyde presents after -2023 diagnostic medial branch blocks targeting the BILATERAL L4-5 and L5-S1 facet joints with fluoroscopic guidance #1 with 80% improvement in pain and improved ability to perform ADLs. Now back pain has returned. It feels bilateral dull and achy, worse with prolonged standing. This is his most bothersome area of pain.    Notes improvement in his left knee pain. Now able to lie on bed. Gets an occasional sharp pain in his left knee.     Of note he has also reported pain at his bilateral shoulders that interferes with his ability to sleep.  It typically does not bother him much during the day.  He endorses radiation of pain from his right neck down his right arm along with impaired sensation in his bilateral hands.  Denies known history of carpal tunnel syndrome or cervical radiculopathy.  Notes that when he was young he had recurrent shoulder subluxations and has had limited range of motion in the shoulders on a chronic basis.  Denies focal weakness in arm.    Taking tylenol or advil OTC for pain with no significant relief.  Has taken gabapentin 100-200mg QHS with no noticeable relief and no known unwanted SE.     Procedure history:  - 22 Lumbar Epidural Steroid Injection at the left L5-S1 level - 100% pain relief x 1 month, then started wearing off 1 week ago, now 20-30% better.  Denies radiating pain in  legs at follow-up on 3/17/2023.  - 2/13/23 left knee steroid injection - 3 days of significant relief  - 3/24/23 left knee steroid injection - 3 days of significant relief  - 05/05/23 Left knee Synvisc One injection - 82% pain relief  - 5/16/2023 diagnostic medial branch blocks targeting the BILATERAL L4-5 and L5-S1 facet joints with fluoroscopic guidance #1 - 80% improvement in pain, improvement in ability to do ADLs like standing, lying down, turning, transitioning between sit and stand.       ROS:   Red Flags ROS:   Fever, Chills, Sweats: Denies  Involuntary Weight Loss: Denies  Bladder Incontinence: Denies  Bowel Incontinence: denies  Saddle Anesthesia: Denies    All other systems reviewed and negative.     PMHx:   Past Medical History:   Diagnosis Date    Arthritis     Back pain     Cataract     Depression     Diabetes (HCC)     borderline    Hypertension     Obstructive chronic bronchitis without exacerbation (HCC) 8/5/2022    Shortness of breath     Sinusitis     Sleep apnea     Wheezing        PSHx:   Past Surgical History:   Procedure Laterality Date    INJ,ANES LUMBAR/CERVICAL Bilateral 5/16/2023    Procedure: Diagnostic medial branch blocks targeting the BILATERAL L4-5 and L5-S1 facet joints with fluoroscopic guidance #1;  Surgeon: Claudia Cervantes M.D.;  [facility];  Service: Pain Management    [place] INJ LUMBAR/SACRAL,W/ IMAGING Left 12/20/2022    Procedure: LEFT lumbar L5-S1 interlaminar epidural steroid injection.;  Surgeon: Claudia Cervantes M.D.;  Location: [facility];  Service: Pain Management    SINUSOTOMIES         Family Hx:   Family History   Problem Relation Age of Onset    Heart Disease Paternal Uncle         MI    Arthritis Mother     Psychiatric Illness Father         stomach cancer    Hypertension Father     Hyperlipidemia Father     Stroke Father        Social Hx:  Social History     Socioeconomic History    Marital status:      Spouse name:  Not on file    Number of children: Not on file    Years of education: Not on file    Highest education level: Not on file   Occupational History    Not on file   Tobacco Use    Smoking status: Former     Packs/day: 2.00     Years: 10.00     Pack years: 20.00     Types: Cigarettes     Quit date: 1977     Years since quittin.7    Smokeless tobacco: Never   Vaping Use    Vaping Use: Never used   Substance and Sexual Activity    Alcohol use: Yes     Comment: 1-2 drinks every week or two    Drug use: No    Sexual activity: Not on file   Other Topics Concern    Not on file   Social History Narrative    Not on file     Social Determinants of Health     Financial Resource Strain: Not on file   Food Insecurity: No Food Insecurity (2022)    Hunger Vital Sign     Worried About Running Out of Food in the Last Year: Never true     Ran Out of Food in the Last Year: Never true   Transportation Needs: No Transportation Needs (2022)    PRAPARE - Transportation     Lack of Transportation (Medical): No     Lack of Transportation (Non-Medical): No   Physical Activity: Inactive (2022)    Exercise Vital Sign     Days of Exercise per Week: 0 days     Minutes of Exercise per Session: 0 min   Stress: Stress Concern Present (2022)    [place] [employer] - Occupational Stress Questionnaire     Feeling of Stress : To some extent   Social Connections: Socially Integrated (2022)    Social Connection and Isolation Panel [NHANES]     Frequency of Communication with Friends and Family: Three times a week     Frequency of Social Gatherings with Friends and Family: Once a week     Attends Buddhist Services: More than 4 times per year     Active Member of Clubs or Organizations: Yes     Attends Club or Organization Meetings: More than 4 times per year     Marital Status:    Intimate Partner Violence: Not on file   Housing Stability: Low Risk  (2022)    Housing Stability Vital Sign      Unable to Pay for Housing in the Last Year: No     Number of Places Lived in the Last Year: 1     Unstable Housing in the Last Year: No       Allergies:  Allergies   Allergen Reactions    Seasonal     Penicillins Unspecified     Childhood - unknown reaction       Medications: reviewed on epic.   Outpatient Medications Marked as Taking for the 5/24/23 encounter (Office Visit) with Claudia Cervantes M.D.   Medication Sig Dispense Refill    diphenhydrAMINE-APAP, sleep, (TYLENOL PM EXTRA STRENGTH)  MG Tab Take 1 Tablet by mouth at bedtime as needed.      Potassium 99 MG Tab Take 0.5 Tablets by mouth every day.      tamsulosin (FLOMAX) 0.4 MG capsule TAKE 2 CAPSULES BY MOUTH ONCE DAILY AS DIRECTED AFTER A MEAL IN THE EVENING      gabapentin (NEURONTIN) 100 MG Cap Take 1-2 tabs po at bedtime as needed for pain 60 Capsule 1    rosuvastatin (CRESTOR) 10 MG Tab Take 1 Tablet by mouth every day for 360 days. 90 Tablet 3    losartan-hydrochlorothiazide (HYZAAR) 100-25 MG per tablet Take 1 Tablet by mouth every day for 360 days. 90 Tablet 3    finasteride (PROSCAR) 5 MG Tab Take 1 Tablet by mouth every day for 360 days. 90 Tablet 3    ketoconazole (NIZORAL) 2 % Cream ketoconazole 2 % topical cream   APPLY CREAM TOPICALLY TO AFFECTED AREA ON THE SKIN TWICE DAILY      fexofenadine (ALLEGRA) 60 MG Tab Take 60 mg by mouth every day.      multivitamin (THERAGRAN) Tab Take 1 Tab by mouth every day.      fluticasone (FLONASE) 50 MCG/ACT nasal spray Spray 1 Spray in nose every day.          Current Outpatient Medications on File Prior to Visit   Medication Sig Dispense Refill    diphenhydrAMINE-APAP, sleep, (TYLENOL PM EXTRA STRENGTH)  MG Tab Take 1 Tablet by mouth at bedtime as needed.      Potassium 99 MG Tab Take 0.5 Tablets by mouth every day.      tamsulosin (FLOMAX) 0.4 MG capsule TAKE 2 CAPSULES BY MOUTH ONCE DAILY AS DIRECTED AFTER A MEAL IN THE EVENING      gabapentin (NEURONTIN) 100 MG Cap Take 1-2 tabs po at  "bedtime as needed for pain 60 Capsule 1    rosuvastatin (CRESTOR) 10 MG Tab Take 1 Tablet by mouth every day for 360 days. 90 Tablet 3    losartan-hydrochlorothiazide (HYZAAR) 100-25 MG per tablet Take 1 Tablet by mouth every day for 360 days. 90 Tablet 3    finasteride (PROSCAR) 5 MG Tab Take 1 Tablet by mouth every day for 360 days. 90 Tablet 3    ketoconazole (NIZORAL) 2 % Cream ketoconazole 2 % topical cream   APPLY CREAM TOPICALLY TO AFFECTED AREA ON THE SKIN TWICE DAILY      fexofenadine (ALLEGRA) 60 MG Tab Take 60 mg by mouth every day.      multivitamin (THERAGRAN) Tab Take 1 Tab by mouth every day.      fluticasone (FLONASE) 50 MCG/ACT nasal spray Spray 1 Spray in nose every day.       No current facility-administered medications on file prior to visit.         EXAMINATION     Physical Exam:   BP (!) 144/64 (BP Location: Right arm, Patient Position: Sitting, BP Cuff Size: Adult)   Pulse 78   Temp 36.3 °C (97.3 °F) (Temporal)   Ht 1.803 m (5' 11\")   Wt (!) 129 kg (283 lb 15.2 oz)     Constitutional:   Body Habitus: Body mass index is 39.6 kg/m².  Cooperation: Fully cooperates with exam  Appearance: Well-groomed, well-nourished.    Eyes: No scleral icterus to suggest severe liver disease, no proptosis to suggest severe hyperthyroidism    ENT -no obvious auditory deficits, no noticeable facial droop     Skin -no rashes or lesions noted     Respiratory-  breathing comfortably on room air, no audible wheezing    Cardiovascular-distal extremities warm and well perfused.  No lower extremity edema is noted.     Gastrointestinal - no obvious abdominal masses, non-distended    Psychiatric- alert and oriented ×3. Normal affect.     Gait - normal gait, no use of ambulatory device, nonantalgic.     Musculoskeletal and Neuro -       Thoracic/Lumbar Spine/Sacral Spine/Hips   Focal tenderness to palpation at left knee medial joint line, just inferior to left knee medial joint line, and supra patellar facet.      Facet " loading maneuver positive bilaterally, worse on left     Palpation:   Tenderness to palpation over the bilateral lower lumbar facets. No tenderness to palpation elsewhere in the low back/hips including midline of lumbosacral spine, paraspinal muscles bilaterally, sacroiliac joints bilaterally, PSIS bilaterally, and greater trochanters bilaterally.    Inspection: No evidence of atrophy in bilateral lower extremities throughout        Key points for the international standards for neurological classification of spinal cord injury (ISNCSCI) to light touch.   Dermatome R L   L2 2 2   L3 2 2   L4 2 2   L5 2 2   S1 2 2   S2 2 2         Motor Exam Lower Extremities  ? Myotome R L   Hip flexion L2 5 5   Knee extension L3 5 5   Ankle dorsiflexion L4 5 5   Toe extension L5 5 5   Ankle plantarflexion S1 5 5        Previous exam  Full active range of motion with knee flexion and extension.  No tenderness to palpation elsewhere in left knee.    Lumbar spine /hip provocative exam maneuvers  Straight leg raise negative bilaterally  FADIR test negative bilaterally     SI joint tests  ASYA test negative bilaterally  Thigh thrust test negative bilaterally      Cervical spine   Inspection: No deformities of the skin over the cervical spine. No rashes or lesions.    limited active range of motion in all directions    Spurling's sign  negative bilaterally  Cervical facet loading maneuver  negative bilaterally    No signs of muscular atrophy in bilateral upper extremities     Tenderness to palpation at paracervical muscles bilaterally, cervical facets bilaterally, and upper trapezius bilaterally. No tenderness to palpation elsewhere including midline of cervical spine.      Key points for the international standards for neurological classification of spinal cord injury (ISNCSCI) to light touch.     Dermatome R L   C4 2 2   C5 2 2   C6 1 1   C7 1 1   C8 1 1   T1 2 2   T2 2 2       Motor Exam Upper Extremities   ? Myotome R L   Shoulder  abduction C5 5 5   Elbow flexion C5 5 5   Wrist extension C6 5 5   Elbow extension C7 5 5   Finger flexion C8 5 5   Finger abduction T1 5 5     Reflexes  ?  R L   Biceps  1+ 1+   Brachioradialis  1+ 1+     March's sign negative bilaterally     Bilateral hands:   Inspection: No swelling, deformities, or rashes. Symmetric appearing thenar and hyperthenar regions bilaterally.  Palpation: no significant tenderness to palpation throughout the bilateral hands  Range of motion is within normal limits throughout bilateral hands, fingers and wrist.    Special tests:  Tinel's at the wrist over the median nerve positive on left, negative on right  Carpal tunnel compression: negative bilaterally  Phalen's test: positive on left, negative on right  Tinel's at the cubital tunnel: negative bilaterally               MEDICAL DECISION MAKING    Medical records review: see under HPI section.     DATA    Labs: No new labs available for review since last visit.   Lab Results   Component Value Date/Time    SODIUM 136 12/08/2022 10:57 AM    POTASSIUM 4.0 12/08/2022 10:57 AM    CHLORIDE 98 12/08/2022 10:57 AM    CO2 24 12/08/2022 10:57 AM    ANION 14.0 12/08/2022 10:57 AM    GLUCOSE 120 (H) 12/08/2022 10:57 AM    BUN 16 12/08/2022 10:57 AM    CREATININE 0.87 12/08/2022 10:57 AM    CALCIUM 10.0 12/08/2022 10:57 AM    ASTSGOT 56 (H) 12/08/2022 10:57 AM    ALTSGPT 80 (H) 12/08/2022 10:57 AM    TBILIRUBIN 0.6 12/08/2022 10:57 AM    ALBUMIN 4.6 12/08/2022 10:57 AM    TOTPROTEIN 7.3 12/08/2022 10:57 AM    GLOBULIN 2.7 12/08/2022 10:57 AM    AGRATIO 1.7 12/08/2022 10:57 AM       No results found for: PROTHROMBTM, INR     Lab Results   Component Value Date/Time    WBC 8.9 08/14/2016 10:50 AM    RBC 5.13 08/14/2016 10:50 AM    HEMOGLOBIN 15.7 08/14/2016 10:50 AM    HEMATOCRIT 45.6 08/14/2016 10:50 AM    MCV 88.9 08/14/2016 10:50 AM    MCH 30.5 08/14/2016 10:50 AM    MCHC 34.3 08/14/2016 10:50 AM    MPV 7.9 08/14/2016 10:50 AM    NEUTSPOLYS 54.6  12/14/2012 08:52 AM    LYMPHOCYTES 32.8 12/14/2012 08:52 AM    MONOCYTES 10.0 (H) 12/14/2012 08:52 AM    EOSINOPHILS 2.3 12/14/2012 08:52 AM    BASOPHILS 0.3 12/14/2012 08:52 AM        Lab Results   Component Value Date/Time    HBA1C 6.2 (H) 08/05/2022 10:03 AM        Imaging:   I personally reviewed following images, these are my reads  MRI lumbar spine 11/11/22  Disc bulge most notable at L2-3, L3-4, and L4-5. Severe left  and mild right neuroforaminal stenosis at L4-5. Mild right neuroforaminal stenosis at L5-S1. Moderate bilateral lateral recess narrowing narrowing and moderate right foraminal narrowing at L3-4. See formal radiology report for further details.    X-ray left knee 11/26/2022  Patella fermin.  Quadriceps tendon enthesophyte.  Mild osteoarthritis at medial compartment.     XR left knee 1/7/2023  Mild tricompartmental osteoarthritis.  Appears slightly worse at medial compartment.  Alignment intact. See formal radiology report for further details.    IMAGING radiology reads. I reviewed the following radiology reads   X-ray left knee 1/7/2023  FINDINGS:  Bone density is normal. There is no evidence of fracture or dislocation. There is tricompartment osteoarthritis characterized by osteophytic spurring. There is a small joint effusion.     IMPRESSION:     Mild tricompartment osteoarthritis.                         Results for orders placed in visit on 11/11/22     MR-LUMBAR SPINE-W/O     Impression  Mild lumbar spine degenerative changes. Type I marrow changes are noted to the adjacent L3-L4 endplates.     There is moderate right-sided foraminal narrowing at the L3-4 level with impingement upon the exiting right L3 nerve     Severe left foraminal narrowing at L4-5 with L4 nerve impingement.     Partially visualized is a mass involving the right renal upper pole. Recommend additional evaluation with a CT of the abdomen and pelvis with contrast, renal protocol.     These unexpected findings were  communicated to the ordering provider by Epic inbasket message at 5:14 PM on 2022.               X-ray left knee 2022  FINDINGS:  No acute fracture or dislocation.     Mild osteoarthritis     Small knee joint effusion.     IMPRESSION:        1. No acute osseous abnormality.    Diagnosis  Visit Diagnoses     ICD-10-CM   1. Chronic pain of left knee  M25.562    G89.29   2. Primary osteoarthritis of left knee  M17.12   3. Lumbar spondylosis  M47.816               ASSESSMENT AND PLAN:  Andrei Galeas (: 1945) is a male with left low back and left knee and medial calf pain that appears to have component of left lumbar radiculitis from severe left neuroforaminal stenosis at L4-5, with ongoing resolution of radiating calf pain after epidural.  Now with significant left knee pain that appears to be mediated by osteoarthritis.    Also with exam today notable for tenderness to palpation at bilateral lower lumbar facet joints with exam notable for positive lumbar facet loading maneuver bilaterally reproducing the patient's pain, suggestive of lumbar facetogenic pain.  Imaging shows lumbar spondylosis at bilateral lower lumbar levels.    Also with pain radiating from neck down right arm along with numbness and tingling in bilateral hands.  Possible cervical radiculitis.  Possible component of carpal tunnel syndrome on left.       Andrei was seen today for follow-up.    Diagnoses and all orders for this visit:    Chronic pain of left knee    Primary osteoarthritis of left knee    Lumbar spondylosis  -     Referral to Pain Clinic            PLAN  Physical Therapy:  I have discussed my recommendation of the patient pursue physical therapy to maximize the likelihood of long-term pain relief for both his knee and back.  I gave him a handout of physical therapy exercises for low back pain at a previous visit and a handout of physical therapy exercises for neck pain today.  He has declined formal physical  therapy referral noting that he would like to focus on injections first     Diagnostic workup:   -previously ordered x-ray cervical spine and MRI cervical spine to assess for possible cause of the pain and suspected cervical radiculitis    Medications:   - continue OTC tylenol, OTC ibuprofen  -Discussed that he could consider continuing gabapentin per PCP discretion.  He does not feel that it is helping.    Interventions:   - interlaminar left L5-S1 epidural steroid injection PRN if radiating pain returns  -Synvisc One injection for left knee under ultrasound guidance today given inadequate relief with left knee steroid injections x 2.  The risks, benefits, and alternatives to this procedure were discussed and the patient wishes to proceed with the procedure. Risks include but are not limited to damage to surrounding structures, infection, bleeding, worsening of pain which can be permanent, and weakness which can be permanent. Benefits include pain relief and improved function. Alternatives include not doing the procedure.    - diagnostic lumbar medial branch blocks #2 targeting Bilateral L4-L5 and L5-S1 facet joints given 80% pain relief and improved ability to perform ADLs after diagnostic lumbar medial branch blocks #1 targeting Bilateral L4-L5 and L5-S1 facet joints. The risks, benefits, and alternatives to this procedure were discussed and the patient wishes to proceed with the procedure. Risks include but are not limited to damage to surrounding structures, infection, bleeding, worsening of pain which can be permanent, and weakness which can be permanent. Benefits include pain relief and improved function. Alternatives include not doing the procedure.      Follow-up: 3-7 days after [facility]    Orders Placed This Encounter    Referral to Pain Clinic       Claudia Cervantes MD  Interventional Pain and Spine  Physical Medicine and Rehabilitation  [facility]      The above note documents my personal evaluation of  this patient. In addition, I have reviewed and confirmed with the patient and MA the supportive information documented in today's Patient Health Questionnaire and Office Note.     Please note that this dictation was created using voice recognition software. I have made every reasonable attempt to correct obvious errors, but I expect that there are errors of grammar and possibly content that I did not discover before finalizing the note.

## 2023-05-24 NOTE — PROGRESS NOTES
Follow-up patient Note    Interventional Pain and Spine  Physiatry (Physical Medicine and Rehabilitation)     Patient Name: Andrei Galeas  : 1945  Date of service: 2023    Chief Complaint:   Chief Complaint   Patient presents with    Follow-Up     Chronic pain of left knee       HISTORY  Please see new patient note by Dr. Cervantes for more details.     HPI  Today's visit   Andrei Galeas ( 1945) is a RHD male with Diagnoses of Chronic pain of left knee, Primary osteoarthritis of left knee, and Lumbar spondylosis were pertinent to this visit.    Today Clyde presents after -2023 diagnostic medial branch blocks targeting the BILATERAL L4-5 and L5-S1 facet joints with fluoroscopic guidance #1 with 80% improvement in pain and improved ability to perform ADLs. Now back pain has returned. It feels bilateral dull and achy, worse with prolonged standing. This is his most bothersome area of pain.    Notes improvement in his left knee pain. Now able to lie on bed. Gets an occasional sharp pain in his left knee.     Of note he has also reported pain at his bilateral shoulders that interferes with his ability to sleep.  It typically does not bother him much during the day.  He endorses radiation of pain from his right neck down his right arm along with impaired sensation in his bilateral hands.  Denies known history of carpal tunnel syndrome or cervical radiculopathy.  Notes that when he was young he had recurrent shoulder subluxations and has had limited range of motion in the shoulders on a chronic basis.  Denies focal weakness in arm.    Taking tylenol or advil OTC for pain with no significant relief.  Has taken gabapentin 100-200mg QHS with no noticeable relief and no known unwanted SE.     Procedure history:  - 22 Lumbar Epidural Steroid Injection at the left L5-S1 level - 100% pain relief x 1 month, then started wearing off 1 week ago, now 20-30% better.  Denies radiating pain in  legs at follow-up on 3/17/2023.  - 2/13/23 left knee steroid injection - 3 days of significant relief  - 3/24/23 left knee steroid injection - 3 days of significant relief  - 05/05/23 Left knee Synvisc One injection - 82% pain relief  - 5/16/2023 diagnostic medial branch blocks targeting the BILATERAL L4-5 and L5-S1 facet joints with fluoroscopic guidance #1 - 80% improvement in pain, improvement in ability to do ADLs like standing, lying down, turning, transitioning between sit and stand.       ROS:   Red Flags ROS:   Fever, Chills, Sweats: Denies  Involuntary Weight Loss: Denies  Bladder Incontinence: Denies  Bowel Incontinence: denies  Saddle Anesthesia: Denies    All other systems reviewed and negative.     PMHx:   Past Medical History:   Diagnosis Date    Arthritis     Back pain     Cataract     Depression     Diabetes (HCC)     borderline    Hypertension     Obstructive chronic bronchitis without exacerbation (HCC) 8/5/2022    Shortness of breath     Sinusitis     Sleep apnea     Wheezing        PSHx:   Past Surgical History:   Procedure Laterality Date    INJ,ANES LUMBAR/CERVICAL Bilateral 5/16/2023    Procedure: Diagnostic medial branch blocks targeting the BILATERAL L4-5 and L5-S1 facet joints with fluoroscopic guidance #1;  Surgeon: Claudia Cervantes M.D.;  [facility];  Service: Pain Management    MI INJ LUMBAR/SACRAL,W/ IMAGING Left 12/20/2022    Procedure: LEFT lumbar L5-S1 interlaminar epidural steroid injection.;  Surgeon: Claudia Cervantes M.D.;  Location: [facility];  Service: Pain Management    SINUSOTOMIES         Family Hx:   Family History   Problem Relation Age of Onset    Heart Disease Paternal Uncle         MI    Arthritis Mother     Psychiatric Illness Father         stomach cancer    Hypertension Father     Hyperlipidemia Father     Stroke Father        Social Hx:  Social History     Socioeconomic History    Marital status:      Spouse name:  Not on file    Number of children: Not on file    Years of education: Not on file    Highest education level: Not on file   Occupational History    Not on file   Tobacco Use    Smoking status: Former     Packs/day: 2.00     Years: 10.00     Pack years: 20.00     Types: Cigarettes     Quit date: 1977     Years since quittin.7    Smokeless tobacco: Never   Vaping Use    Vaping Use: Never used   Substance and Sexual Activity    Alcohol use: Yes     Comment: 1-2 drinks every week or two    Drug use: No    Sexual activity: Not on file   Other Topics Concern    Not on file   Social History Narrative    Not on file     Social Determinants of Health     Financial Resource Strain: Not on file   Food Insecurity: No Food Insecurity (2022)    Hunger Vital Sign     Worried About Running Out of Food in the Last Year: Never true     Ran Out of Food in the Last Year: Never true   Transportation Needs: No Transportation Needs (2022)    PRAPARE - Transportation     Lack of Transportation (Medical): No     Lack of Transportation (Non-Medical): No   Physical Activity: Inactive (2022)    Exercise Vital Sign     Days of Exercise per Week: 0 days     Minutes of Exercise per Session: 0 min   Stress: Stress Concern Present (2022)    [place] [employer] - Occupational Stress Questionnaire     Feeling of Stress : To some extent   Social Connections: Socially Integrated (2022)    Social Connection and Isolation Panel [NHANES]     Frequency of Communication with Friends and Family: Three times a week     Frequency of Social Gatherings with Friends and Family: Once a week     Attends Islam Services: More than 4 times per year     Active Member of Clubs or Organizations: Yes     Attends Club or Organization Meetings: More than 4 times per year     Marital Status:    Intimate Partner Violence: Not on file   Housing Stability: Low Risk  (2022)    Housing Stability Vital Sign      Unable to Pay for Housing in the Last Year: No     Number of Places Lived in the Last Year: 1     Unstable Housing in the Last Year: No       Allergies:  Allergies   Allergen Reactions    Seasonal     Penicillins Unspecified     Childhood - unknown reaction       Medications: reviewed on epic.   Outpatient Medications Marked as Taking for the 5/24/23 encounter (Office Visit) with Claudia Cervantes M.D.   Medication Sig Dispense Refill    diphenhydrAMINE-APAP, sleep, (TYLENOL PM EXTRA STRENGTH)  MG Tab Take 1 Tablet by mouth at bedtime as needed.      Potassium 99 MG Tab Take 0.5 Tablets by mouth every day.      tamsulosin (FLOMAX) 0.4 MG capsule TAKE 2 CAPSULES BY MOUTH ONCE DAILY AS DIRECTED AFTER A MEAL IN THE EVENING      gabapentin (NEURONTIN) 100 MG Cap Take 1-2 tabs po at bedtime as needed for pain 60 Capsule 1    rosuvastatin (CRESTOR) 10 MG Tab Take 1 Tablet by mouth every day for 360 days. 90 Tablet 3    losartan-hydrochlorothiazide (HYZAAR) 100-25 MG per tablet Take 1 Tablet by mouth every day for 360 days. 90 Tablet 3    finasteride (PROSCAR) 5 MG Tab Take 1 Tablet by mouth every day for 360 days. 90 Tablet 3    ketoconazole (NIZORAL) 2 % Cream ketoconazole 2 % topical cream   APPLY CREAM TOPICALLY TO AFFECTED AREA ON THE SKIN TWICE DAILY      fexofenadine (ALLEGRA) 60 MG Tab Take 60 mg by mouth every day.      multivitamin (THERAGRAN) Tab Take 1 Tab by mouth every day.      fluticasone (FLONASE) 50 MCG/ACT nasal spray Spray 1 Spray in nose every day.          Current Outpatient Medications on File Prior to Visit   Medication Sig Dispense Refill    diphenhydrAMINE-APAP, sleep, (TYLENOL PM EXTRA STRENGTH)  MG Tab Take 1 Tablet by mouth at bedtime as needed.      Potassium 99 MG Tab Take 0.5 Tablets by mouth every day.      tamsulosin (FLOMAX) 0.4 MG capsule TAKE 2 CAPSULES BY MOUTH ONCE DAILY AS DIRECTED AFTER A MEAL IN THE EVENING      gabapentin (NEURONTIN) 100 MG Cap Take 1-2 tabs po at  "bedtime as needed for pain 60 Capsule 1    rosuvastatin (CRESTOR) 10 MG Tab Take 1 Tablet by mouth every day for 360 days. 90 Tablet 3    losartan-hydrochlorothiazide (HYZAAR) 100-25 MG per tablet Take 1 Tablet by mouth every day for 360 days. 90 Tablet 3    finasteride (PROSCAR) 5 MG Tab Take 1 Tablet by mouth every day for 360 days. 90 Tablet 3    ketoconazole (NIZORAL) 2 % Cream ketoconazole 2 % topical cream   APPLY CREAM TOPICALLY TO AFFECTED AREA ON THE SKIN TWICE DAILY      fexofenadine (ALLEGRA) 60 MG Tab Take 60 mg by mouth every day.      multivitamin (THERAGRAN) Tab Take 1 Tab by mouth every day.      fluticasone (FLONASE) 50 MCG/ACT nasal spray Spray 1 Spray in nose every day.       No current facility-administered medications on file prior to visit.         EXAMINATION     Physical Exam:   BP (!) 144/64 (BP Location: Right arm, Patient Position: Sitting, BP Cuff Size: Adult)   Pulse 78   Temp 36.3 °C (97.3 °F) (Temporal)   Ht 1.803 m (5' 11\")   Wt (!) 129 kg (283 lb 15.2 oz)     Constitutional:   Body Habitus: Body mass index is 39.6 kg/m².  Cooperation: Fully cooperates with exam  Appearance: Well-groomed, well-nourished.    Eyes: No scleral icterus to suggest severe liver disease, no proptosis to suggest severe hyperthyroidism    ENT -no obvious auditory deficits, no noticeable facial droop     Skin -no rashes or lesions noted     Respiratory-  breathing comfortably on room air, no audible wheezing    Cardiovascular-distal extremities warm and well perfused.  No lower extremity edema is noted.     Gastrointestinal - no obvious abdominal masses, non-distended    Psychiatric- alert and oriented ×3. Normal affect.     Gait - normal gait, no use of ambulatory device, nonantalgic.     Musculoskeletal and Neuro -       Thoracic/Lumbar Spine/Sacral Spine/Hips   Focal tenderness to palpation at left knee medial joint line, just inferior to left knee medial joint line, and supra patellar facet.      Facet " loading maneuver positive bilaterally, worse on left     Palpation:   Tenderness to palpation over the bilateral lower lumbar facets. No tenderness to palpation elsewhere in the low back/hips including midline of lumbosacral spine, paraspinal muscles bilaterally, sacroiliac joints bilaterally, PSIS bilaterally, and greater trochanters bilaterally.    Inspection: No evidence of atrophy in bilateral lower extremities throughout        Key points for the international standards for neurological classification of spinal cord injury (ISNCSCI) to light touch.   Dermatome R L   L2 2 2   L3 2 2   L4 2 2   L5 2 2   S1 2 2   S2 2 2         Motor Exam Lower Extremities  ? Myotome R L   Hip flexion L2 5 5   Knee extension L3 5 5   Ankle dorsiflexion L4 5 5   Toe extension L5 5 5   Ankle plantarflexion S1 5 5        Previous exam  Full active range of motion with knee flexion and extension.  No tenderness to palpation elsewhere in left knee.    Lumbar spine /hip provocative exam maneuvers  Straight leg raise negative bilaterally  FADIR test negative bilaterally     SI joint tests  ASYA test negative bilaterally  Thigh thrust test negative bilaterally      Cervical spine   Inspection: No deformities of the skin over the cervical spine. No rashes or lesions.    limited active range of motion in all directions    Spurling's sign  negative bilaterally  Cervical facet loading maneuver  negative bilaterally    No signs of muscular atrophy in bilateral upper extremities     Tenderness to palpation at paracervical muscles bilaterally, cervical facets bilaterally, and upper trapezius bilaterally. No tenderness to palpation elsewhere including midline of cervical spine.      Key points for the international standards for neurological classification of spinal cord injury (ISNCSCI) to light touch.     Dermatome R L   C4 2 2   C5 2 2   C6 1 1   C7 1 1   C8 1 1   T1 2 2   T2 2 2       Motor Exam Upper Extremities   ? Myotome R L   Shoulder  abduction C5 5 5   Elbow flexion C5 5 5   Wrist extension C6 5 5   Elbow extension C7 5 5   Finger flexion C8 5 5   Finger abduction T1 5 5     Reflexes  ?  R L   Biceps  1+ 1+   Brachioradialis  1+ 1+     March's sign negative bilaterally     Bilateral hands:   Inspection: No swelling, deformities, or rashes. Symmetric appearing thenar and hyperthenar regions bilaterally.  Palpation: no significant tenderness to palpation throughout the bilateral hands  Range of motion is within normal limits throughout bilateral hands, fingers and wrist.    Special tests:  Tinel's at the wrist over the median nerve positive on left, negative on right  Carpal tunnel compression: negative bilaterally  Phalen's test: positive on left, negative on right  Tinel's at the cubital tunnel: negative bilaterally               MEDICAL DECISION MAKING    Medical records review: see under HPI section.     DATA    Labs: No new labs available for review since last visit.   Lab Results   Component Value Date/Time    SODIUM 136 12/08/2022 10:57 AM    POTASSIUM 4.0 12/08/2022 10:57 AM    CHLORIDE 98 12/08/2022 10:57 AM    CO2 24 12/08/2022 10:57 AM    ANION 14.0 12/08/2022 10:57 AM    GLUCOSE 120 (H) 12/08/2022 10:57 AM    BUN 16 12/08/2022 10:57 AM    CREATININE 0.87 12/08/2022 10:57 AM    CALCIUM 10.0 12/08/2022 10:57 AM    ASTSGOT 56 (H) 12/08/2022 10:57 AM    ALTSGPT 80 (H) 12/08/2022 10:57 AM    TBILIRUBIN 0.6 12/08/2022 10:57 AM    ALBUMIN 4.6 12/08/2022 10:57 AM    TOTPROTEIN 7.3 12/08/2022 10:57 AM    GLOBULIN 2.7 12/08/2022 10:57 AM    AGRATIO 1.7 12/08/2022 10:57 AM       No results found for: PROTHROMBTM, INR     Lab Results   Component Value Date/Time    WBC 8.9 08/14/2016 10:50 AM    RBC 5.13 08/14/2016 10:50 AM    HEMOGLOBIN 15.7 08/14/2016 10:50 AM    HEMATOCRIT 45.6 08/14/2016 10:50 AM    MCV 88.9 08/14/2016 10:50 AM    MCH 30.5 08/14/2016 10:50 AM    MCHC 34.3 08/14/2016 10:50 AM    MPV 7.9 08/14/2016 10:50 AM    NEUTSPOLYS 54.6  12/14/2012 08:52 AM    LYMPHOCYTES 32.8 12/14/2012 08:52 AM    MONOCYTES 10.0 (H) 12/14/2012 08:52 AM    EOSINOPHILS 2.3 12/14/2012 08:52 AM    BASOPHILS 0.3 12/14/2012 08:52 AM        Lab Results   Component Value Date/Time    HBA1C 6.2 (H) 08/05/2022 10:03 AM        Imaging:   I personally reviewed following images, these are my reads  MRI lumbar spine 11/11/22  Disc bulge most notable at L2-3, L3-4, and L4-5. Severe left  and mild right neuroforaminal stenosis at L4-5. Mild right neuroforaminal stenosis at L5-S1. Moderate bilateral lateral recess narrowing narrowing and moderate right foraminal narrowing at L3-4. See formal radiology report for further details.    X-ray left knee 11/26/2022  Patella fermin.  Quadriceps tendon enthesophyte.  Mild osteoarthritis at medial compartment.     XR left knee 1/7/2023  Mild tricompartmental osteoarthritis.  Appears slightly worse at medial compartment.  Alignment intact. See formal radiology report for further details.    IMAGING radiology reads. I reviewed the following radiology reads   X-ray left knee 1/7/2023  FINDINGS:  Bone density is normal. There is no evidence of fracture or dislocation. There is tricompartment osteoarthritis characterized by osteophytic spurring. There is a small joint effusion.     IMPRESSION:     Mild tricompartment osteoarthritis.                         Results for orders placed in visit on 11/11/22     MR-LUMBAR SPINE-W/O     Impression  Mild lumbar spine degenerative changes. Type I marrow changes are noted to the adjacent L3-L4 endplates.     There is moderate right-sided foraminal narrowing at the L3-4 level with impingement upon the exiting right L3 nerve     Severe left foraminal narrowing at L4-5 with L4 nerve impingement.     Partially visualized is a mass involving the right renal upper pole. Recommend additional evaluation with a CT of the abdomen and pelvis with contrast, renal protocol.     These unexpected findings were  communicated to the ordering provider by Epic inbasket message at 5:14 PM on 2022.               X-ray left knee 2022  FINDINGS:  No acute fracture or dislocation.     Mild osteoarthritis     Small knee joint effusion.     IMPRESSION:        1. No acute osseous abnormality.    Diagnosis  Visit Diagnoses     ICD-10-CM   1. Chronic pain of left knee  M25.562    G89.29   2. Primary osteoarthritis of left knee  M17.12   3. Lumbar spondylosis  M47.816               ASSESSMENT AND PLAN:  Andrei Galeas (: 1945) is a male with left low back and left knee and medial calf pain that appears to have component of left lumbar radiculitis from severe left neuroforaminal stenosis at L4-5, with ongoing resolution of radiating calf pain after epidural.  Now with significant left knee pain that appears to be mediated by osteoarthritis.    Also with exam today notable for tenderness to palpation at bilateral lower lumbar facet joints with exam notable for positive lumbar facet loading maneuver bilaterally reproducing the patient's pain, suggestive of lumbar facetogenic pain.  Imaging shows lumbar spondylosis at bilateral lower lumbar levels.    Also with pain radiating from neck down right arm along with numbness and tingling in bilateral hands.  Possible cervical radiculitis.  Possible component of carpal tunnel syndrome on left.       Andrei was seen today for follow-up.    Diagnoses and all orders for this visit:    Chronic pain of left knee    Primary osteoarthritis of left knee    Lumbar spondylosis  -     Referral to Pain Clinic            PLAN  Physical Therapy:  I have discussed my recommendation of the patient pursue physical therapy to maximize the likelihood of long-term pain relief for both his knee and back.  I gave him a handout of physical therapy exercises for low back pain at a previous visit and a handout of physical therapy exercises for neck pain today.  He has declined formal physical  therapy referral noting that he would like to focus on injections first     Diagnostic workup:   -previously ordered x-ray cervical spine and MRI cervical spine to assess for possible cause of the pain and suspected cervical radiculitis    Medications:   - continue OTC tylenol, OTC ibuprofen  -Discussed that he could consider continuing gabapentin per PCP discretion.  He does not feel that it is helping.    Interventions:   - interlaminar left L5-S1 epidural steroid injection PRN if radiating pain returns  -Synvisc One injection for left knee under ultrasound guidance today given inadequate relief with left knee steroid injections x 2.  The risks, benefits, and alternatives to this procedure were discussed and the patient wishes to proceed with the procedure. Risks include but are not limited to damage to surrounding structures, infection, bleeding, worsening of pain which can be permanent, and weakness which can be permanent. Benefits include pain relief and improved function. Alternatives include not doing the procedure.    - diagnostic lumbar medial branch blocks #2 targeting Bilateral L4-L5 and L5-S1 facet joints given 80% pain relief and improved ability to perform ADLs after diagnostic lumbar medial branch blocks #1 targeting Bilateral L4-L5 and L5-S1 facet joints. The risks, benefits, and alternatives to this procedure were discussed and the patient wishes to proceed with the procedure. Risks include but are not limited to damage to surrounding structures, infection, bleeding, worsening of pain which can be permanent, and weakness which can be permanent. Benefits include pain relief and improved function. Alternatives include not doing the procedure.      Follow-up: 3-7 days after LMBB    Orders Placed This Encounter    Referral to Pain Clinic       Claudia Cervantes MD  Interventional Pain and Spine  Physical Medicine and Rehabilitation  [facility]      The above note documents my personal evaluation of  this patient. In addition, I have reviewed and confirmed with the patient and MA the supportive information documented in today's Patient Health Questionnaire and Office Note.     Please note that this dictation was created using voice recognition software. I have made every reasonable attempt to correct obvious errors, but I expect that there are errors of grammar and possibly content that I did not discover before finalizing the note.

## 2023-05-24 NOTE — TELEPHONE ENCOUNTER
Called for post-sp check-up. Pt reported the following regarding the procedure site: Diagnostic medial branch blocks targeting the BILATERAL L4-5 and L5-S1 facet joints with fluoroscopic guidance #1    Change in pain?: CRISTEL    Concerns?: LM    Confirmed [facility] appt?: CRISTEL, 5/24

## 2023-06-01 ENCOUNTER — HOSPITAL ENCOUNTER (OUTPATIENT)
Facility: REHABILITATION | Age: 78
End: 2023-06-01
Attending: STUDENT IN AN ORGANIZED HEALTH CARE EDUCATION/TRAINING PROGRAM | Admitting: STUDENT IN AN ORGANIZED HEALTH CARE EDUCATION/TRAINING PROGRAM
Payer: MEDICARE

## 2023-06-01 ENCOUNTER — APPOINTMENT (OUTPATIENT)
Dept: RADIOLOGY | Facility: REHABILITATION | Age: 78
End: 2023-06-01
Attending: STUDENT IN AN ORGANIZED HEALTH CARE EDUCATION/TRAINING PROGRAM
Payer: MEDICARE

## 2023-06-01 VITALS
DIASTOLIC BLOOD PRESSURE: 63 MMHG | WEIGHT: 284.39 LBS | OXYGEN SATURATION: 95 % | HEIGHT: 71 IN | SYSTOLIC BLOOD PRESSURE: 134 MMHG | HEART RATE: 66 BPM | RESPIRATION RATE: 18 BRPM | TEMPERATURE: 97.9 F | BODY MASS INDEX: 39.81 KG/M2

## 2023-06-01 PROCEDURE — 700111 HCHG RX REV CODE 636 W/ 250 OVERRIDE (IP)

## 2023-06-01 PROCEDURE — 64494 INJ PARAVERT F JNT L/S 2 LEV: CPT

## 2023-06-01 PROCEDURE — 64493 INJ PARAVERT F JNT L/S 1 LEV: CPT

## 2023-06-01 PROCEDURE — 700101 HCHG RX REV CODE 250

## 2023-06-01 PROCEDURE — 700117 HCHG RX CONTRAST REV CODE 255

## 2023-06-01 RX ORDER — LIDOCAINE HYDROCHLORIDE 10 MG/ML
INJECTION, SOLUTION EPIDURAL; INFILTRATION; INTRACAUDAL; PERINEURAL
Status: COMPLETED
Start: 2023-06-01 | End: 2023-06-01

## 2023-06-01 RX ORDER — BUPIVACAINE HYDROCHLORIDE 5 MG/ML
INJECTION, SOLUTION EPIDURAL; INTRACAUDAL
Status: COMPLETED
Start: 2023-06-01 | End: 2023-06-01

## 2023-06-01 RX ADMIN — IOHEXOL 3 ML: 240 INJECTION, SOLUTION INTRATHECAL; INTRAVASCULAR; INTRAVENOUS; ORAL at 15:45

## 2023-06-01 RX ADMIN — BUPIVACAINE HYDROCHLORIDE 5 ML: 5 INJECTION, SOLUTION EPIDURAL; INTRACAUDAL at 15:46

## 2023-06-01 RX ADMIN — LIDOCAINE HYDROCHLORIDE 10 ML: 10 INJECTION, SOLUTION EPIDURAL; INFILTRATION; INTRACAUDAL at 15:44

## 2023-06-01 ASSESSMENT — PAIN DESCRIPTION - PAIN TYPE: TYPE: CHRONIC PAIN

## 2023-06-01 NOTE — PROGRESS NOTES
1443 Pt arrived to pre-procedure area. Procedure, pain diary, & plan for recovery reviewed. Site confirmed & consent signed. Allergies & current medications verified. Appointed  waiting in car. Printed discharge instructions discussed & signed. Pt denies taking NSAIDS or anticoagulants in the last 5 days. MD to bedside prior to procedure.     1602 Pt to recovery area s/p MBB #2 & updates received from procedure RN. Pt reports no change in pain at this time. Ice pack given for home care. PO fluids given. Dr. Cervantes to bedside for post-procedure evaluation.      1615 Pt ambulates without difficulty & meets DC criteria. RN assisted pt off unit to appointed .

## 2023-06-01 NOTE — INTERVAL H&P NOTE
H&P reviewed. The patient was examined and there are no changes to the H&P    Claudia Cervantes MD  Interventional Pain and Spine  Physical Medicine and Rehabilitation  [facility]

## 2023-06-01 NOTE — OR SURGEON
Immediate Post OP Note    Pre-Op Diagnosis Codes:     * Lumbar spondylosis [M47.816]      Post-Op Diagnosis Codes:     * Lumbar spondylosis [M47.816]      Procedure(s):  Diagnostic medial branch blocks targeting the BILATERAL L4-5 and L5-S1 facet joints with fluoroscopic guidance #2 - Wound Class: Clean    Surgeon(s):  Claudia Cervantes M.D.    Anesthesiologist/Type of Anesthesia:  No anesthesia staff entered./Local    Surgical Staff:  Circulator: Keyla Banda R.N.  Scrub Person: Donna Jang  Radiology Technologist: Mary Vogel    Specimens removed if any:  * No specimens in log *    Estimated Blood Loss: None    Findings: None    Complications: None        6/1/2023 4:14 PM Claudia Cervantes M.D.

## 2023-06-02 NOTE — OP REPORT
"Date of Service: see epic time stamp for DOS    Patient: Andrei Galeas 77 y.o. male     MRN: [id number]     Physician/s: Claudia Cervantes MD    Pre-operative Diagnosis: Lumbosacral spondylosis, facet arthropathy. The patient was NOT seen for lumbar radiculopathy today.     Post-operative Diagnosis: Lumbosacral spondylosis, facet arthropathy. The patient was NOT seen for lumbar radiculopathy today.     Procedure:  diagnostic lumbar medial branch blocks targeting the bilateral L4-L5 and L5-S1 facet joints    Description of procedure:    The risks, benefits, and alternatives of the procedure were reviewed and discussed with the patient.  Written informed consent was freely obtained. A pre-procedural time-out was conducted by the physician verifying patient’s identity, procedure to be performed, procedure site and side, and allergy verification. Appropriate equipment was determined to be in place for the procedure.     The patient's vital signs were carefully monitored before, throughout, and after the procedure.     In the fluoroscopy suite the patient was placed in a prone position and the skin was prepped and draped in the usual sterile fashion. The fluoroscope was placed over the lower back at the appropriate angles, and the targets for injection were marked. A 27g needle was placed into each of the markings at the levels below, and approx 1cc of 1% Lidocaine was injected subcutaneously into the epidermal and dermal layers. The needle was removed intact.       Using an oblique view, A 22g 5\" needle was then placed at the intersection of the transverse process and superior articular process at the L4-5 facet joint where the L3 medial branch runs on the right side. The needle tips were then verified by AP, oblique, and lateral views.     Using an oblique view, A 22g 5\" needle was then placed at the intersection of the transverse process and superior articular process at the L5-S1 facet joint where the L4 medial " "branch runs  on the right side. The needle tips were then verified by AP, oblique, and lateral views.     Using an oblique view, A 22g 5\" needle was then placed at the intersection of the transverse process and superior articular process at the S1 facet where the L5 dorsal ramus runs  on the right side. The needle tips were then verified by AP, oblique, and lateral views.     Using an oblique view, A 22g 5\" needle was then placed at the intersection of the transverse process and superior articular process at the L4-5 facet joint where the L3 medial branch runs on the left side. The needle tips were then verified by AP, oblique, and lateral views.     Using an oblique view, A 22g 5\" needle was then placed at the intersection of the transverse process and superior articular process at the L5-S1 facet joint where the L4 medial branch runs  on the left side. The needle tips were then verified by AP, oblique, and lateral views.     Using an oblique view, A 22g 5\" needle was then placed at the intersection of the transverse process and superior articular process at the S1 facet where the L5 dorsal ramus runs  on the left side. The needle tips were then verified by AP, oblique, and lateral views.     In the AP view, contrast dye was injected. At the right L5-S1 facet and bilateral S1 facets the contrast flow originally did not follow the path of the medial branch at that level, and the needle was repositioned and subsequent injection of less than 1 cc of contrast dye highlighted the medial branch with the fluoroscope running live at the levels above. Following negative aspiration, approximately 0.5 ml. of  0.5% bupivacaine was then injected at the above levels, and the needles were removed intact after restyleted. The patient's back was covered with a 4x4 gauze, the area was cleansed with sterile normal saline, and a dressing was applied.       There were no complications noted, the patient was hemodynamically stable, and " tolerated the procedure well.     Follow-up as scheduled    Claudia Cervantes MD  Interventional Pain and Spine  Physical Medicine and Rehabilitation  [facility]      CPT  Intraarticular joint or medial branch block (MBB) - lumbar or sacral (1st level):  47856-yz -50  Intraarticular joint or medial branch block (MBB) - lumbar or sacral (2nd level):  32262-mx -50

## 2023-06-07 ENCOUNTER — OFFICE VISIT (OUTPATIENT)
Dept: PHYSICAL MEDICINE AND REHAB | Facility: MEDICAL CENTER | Age: 78
End: 2023-06-07
Payer: MEDICARE

## 2023-06-07 VITALS
HEART RATE: 70 BPM | TEMPERATURE: 97.8 F | BODY MASS INDEX: 40.09 KG/M2 | OXYGEN SATURATION: 98 % | HEIGHT: 71 IN | WEIGHT: 286.38 LBS | DIASTOLIC BLOOD PRESSURE: 72 MMHG | SYSTOLIC BLOOD PRESSURE: 128 MMHG

## 2023-06-07 DIAGNOSIS — R20.2 NUMBNESS AND TINGLING IN LEFT ARM: ICD-10-CM

## 2023-06-07 DIAGNOSIS — G89.29 CHRONIC MIDLINE LOW BACK PAIN WITHOUT SCIATICA: ICD-10-CM

## 2023-06-07 DIAGNOSIS — M25.562 CHRONIC PAIN OF LEFT KNEE: ICD-10-CM

## 2023-06-07 DIAGNOSIS — M17.12 PRIMARY OSTEOARTHRITIS OF LEFT KNEE: ICD-10-CM

## 2023-06-07 DIAGNOSIS — M47.816 LUMBAR SPONDYLOSIS: ICD-10-CM

## 2023-06-07 DIAGNOSIS — M54.16 LEFT LUMBAR RADICULITIS: ICD-10-CM

## 2023-06-07 DIAGNOSIS — R20.0 NUMBNESS AND TINGLING OF RIGHT ARM: ICD-10-CM

## 2023-06-07 DIAGNOSIS — M54.2 CERVICALGIA: ICD-10-CM

## 2023-06-07 DIAGNOSIS — M54.50 CHRONIC MIDLINE LOW BACK PAIN WITHOUT SCIATICA: ICD-10-CM

## 2023-06-07 DIAGNOSIS — R20.2 NUMBNESS AND TINGLING OF RIGHT ARM: ICD-10-CM

## 2023-06-07 DIAGNOSIS — G89.29 CHRONIC PAIN OF LEFT KNEE: ICD-10-CM

## 2023-06-07 DIAGNOSIS — R20.0 NUMBNESS AND TINGLING IN LEFT ARM: ICD-10-CM

## 2023-06-07 PROCEDURE — 3078F DIAST BP <80 MM HG: CPT | Performed by: STUDENT IN AN ORGANIZED HEALTH CARE EDUCATION/TRAINING PROGRAM

## 2023-06-07 PROCEDURE — 1125F AMNT PAIN NOTED PAIN PRSNT: CPT | Performed by: STUDENT IN AN ORGANIZED HEALTH CARE EDUCATION/TRAINING PROGRAM

## 2023-06-07 PROCEDURE — 99213 OFFICE O/P EST LOW 20 MIN: CPT | Performed by: STUDENT IN AN ORGANIZED HEALTH CARE EDUCATION/TRAINING PROGRAM

## 2023-06-07 PROCEDURE — 3074F SYST BP LT 130 MM HG: CPT | Performed by: STUDENT IN AN ORGANIZED HEALTH CARE EDUCATION/TRAINING PROGRAM

## 2023-06-07 ASSESSMENT — PATIENT HEALTH QUESTIONNAIRE - PHQ9
5. POOR APPETITE OR OVEREATING: 0 - NOT AT ALL
CLINICAL INTERPRETATION OF PHQ2 SCORE: 2
SUM OF ALL RESPONSES TO PHQ QUESTIONS 1-9: 8

## 2023-06-07 ASSESSMENT — PAIN SCALES - GENERAL: PAINLEVEL: 3=SLIGHT PAIN

## 2023-06-07 NOTE — PROGRESS NOTES
Follow-up patient Note    Interventional Pain and Spine  Physiatry (Physical Medicine and Rehabilitation)     Patient Name: Andrei Galeas  : 1945  Date of service: 2023    Chief Complaint:   Chief Complaint   Patient presents with    Follow-Up     Chronic pain of left knee       HISTORY  Please see new patient note by Dr. Cervantes for more details.     HPI  Today's visit   Andrei Galeas ( 1945) is a RHD male with Diagnoses of Lumbar spondylosis, Chronic pain of left knee, Primary osteoarthritis of left knee, Cervicalgia, Numbness and tingling of right arm, Numbness and tingling in left arm, Left lumbar radiculitis, and Chronic midline low back pain without sciatica were pertinent to this visit.    Today Clyde presents after 23 diagnostic lumbar medial branch blocks targeting the bilateral L4-L5 and L5-S1 facet joints #2 with 80% improvement in pain and improvement in ability to do ADLs including sitting for a prolonged time. Still with ongoing low back pain that limits his ability to stand for prolonged time.  Pain severity 2-3/10 on NRS, feeling dull and achy at his midline and bilateral low back and sacral area.  He denies pain radiating down his legs or numbness or tingling or weakness in his legs.    Notes ongoing left knee pain that is still bothersome, but still slightly improved before.    No longer having radiating pain from the neck down the arm.     Taking tylenol or advil PRN for pain QHS with improvement in sleep. Has taken gabapentin 100-200mg QHS with no noticeable relief and no known unwanted SE.     Procedure history:  - 22 Lumbar Epidural Steroid Injection at the left L5-S1 level - 100% pain relief x 1 month, then started wearing off 1 week ago, now 20-30% better.  Denies radiating pain in legs at follow-up on 3/17/2023.  - 23 left knee steroid injection - 3 days of significant relief  - 3/24/23 left knee steroid injection - 3 days of significant  relief  - 05/05/23 Left knee Synvisc One injection - 82% pain relief  - 5/16/2023 diagnostic medial branch blocks targeting the BILATERAL L4-5 and L5-S1 facet joints with fluoroscopic guidance #1 - 80% improvement in pain, improvement in ability to do ADLs like standing, lying down, turning, transitioning between sit and stand.   - 6/1/23 diagnostic lumbar medial branch blocks targeting the bilateral L4-L5 and L5-S1 facet joints #2 - 80% improvement in pain and improvement in sitting for a prolonged time, standing, lying down      ROS:   Red Flags ROS:   Fever, Chills, Sweats: Denies  Involuntary Weight Loss: Denies  Bladder Incontinence: Denies  Bowel Incontinence: denies  Saddle Anesthesia: Denies    All other systems reviewed and negative.     PMHx:   Past Medical History:   Diagnosis Date    Arthritis     Back pain     Cataract     Depression     Diabetes (HCC)     borderline    Hypertension     Obstructive chronic bronchitis without exacerbation (HCC) 8/5/2022    Shortness of breath     Sinusitis     Sleep apnea     Wheezing        PSHx:   Past Surgical History:   Procedure Laterality Date    NM INJ DX/THER AGNT PARAVERT FACET JOINT, CELESTE* Bilateral 6/1/2023    Procedure: Diagnostic medial branch blocks targeting the BILATERAL L4-5 and L5-S1 facet joints with fluoroscopic guidance #2;  Surgeon: Claudia Cervantes M.D.;  [facility];  Service: Pain Management    INJ,ANES LUMBAR/CERVICAL Bilateral 5/16/2023    Procedure: Diagnostic medial branch blocks targeting the BILATERAL L4-5 and L5-S1 facet joints with fluoroscopic guidance #1;  Surgeon: Claudia Cervantes M.D.;  Location: [facility];  Service: Pain Management    NM INJ LUMBAR/SACRAL,W/ IMAGING Left 12/20/2022    Procedure: LEFT lumbar L5-S1 interlaminar epidural steroid injection.;  Surgeon: Claudia Cervantes M.D.;  [facility]: [facility];  Service: Pain Management    SINUSOTOMIES         Family Hx:    Family History   Problem Relation Age of Onset    Heart Disease Paternal Uncle         MI    Arthritis Mother     Psychiatric Illness Father         stomach cancer    Hypertension Father     Hyperlipidemia Father     Stroke Father        Social Hx:  Social History     Socioeconomic History    Marital status:      Spouse name: Not on file    Number of children: Not on file    Years of education: Not on file    Highest education level: Not on file   Occupational History    Not on file   Tobacco Use    Smoking status: Former     Packs/day: 2.00     Years: 10.00     Pack years: 20.00     Types: Cigarettes     Quit date: 1977     Years since quittin.7    Smokeless tobacco: Never   Vaping Use    Vaping Use: Never used   Substance and Sexual Activity    Alcohol use: Yes     Comment: 1-2 drinks every week or two    Drug use: No    Sexual activity: Not on file   Other Topics Concern    Not on file   Social History Narrative    Not on file     Social Determinants of Health     Financial Resource Strain: Not on file   Food Insecurity: No Food Insecurity (2022)    Hunger Vital Sign     Worried About Running Out of Food in the Last Year: Never true     Ran Out of Food in the Last Year: Never true   Transportation Needs: No Transportation Needs (2022)    PRAPARE - Transportation     Lack of Transportation (Medical): No     Lack of Transportation (Non-Medical): No   Physical Activity: Inactive (2022)    Exercise Vital Sign     Days of Exercise per Week: 0 days     Minutes of Exercise per Session: 0 min   Stress: Stress Concern Present (2022)    [employer] [place] [employer] - Occupational Stress Questionnaire     Feeling of Stress : To some extent   Social Connections: Socially Integrated (2022)    Social Connection and Isolation Panel [NHANES]     Frequency of Communication with Friends and Family: Three times a week     Frequency of Social Gatherings with Friends and Family: Once  a week     Attends Bahai Services: More than 4 times per year     Active Member of Clubs or Organizations: Yes     Attends Club or Organization Meetings: More than 4 times per year     Marital Status:    Intimate Partner Violence: Not on file   Housing Stability: Low Risk  (8/4/2022)    Housing Stability Vital Sign     Unable to Pay for Housing in the Last Year: No     Number of Places Lived in the Last Year: 1     Unstable Housing in the Last Year: No       Allergies:  Allergies   Allergen Reactions    Seasonal     Penicillins Unspecified     Childhood - unknown reaction       Medications: reviewed on epic.   Outpatient Medications Marked as Taking for the 6/7/23 encounter (Office Visit) with Claudia Cervantes M.D.   Medication Sig Dispense Refill    diphenhydrAMINE-APAP, sleep, (TYLENOL PM EXTRA STRENGTH)  MG Tab Take 1 Tablet by mouth at bedtime as needed.      Potassium 99 MG Tab Take 0.5 Tablets by mouth every day.      tamsulosin (FLOMAX) 0.4 MG capsule TAKE 2 CAPSULES BY MOUTH ONCE DAILY AS DIRECTED AFTER A MEAL IN THE EVENING      gabapentin (NEURONTIN) 100 MG Cap Take 1-2 tabs po at bedtime as needed for pain 60 Capsule 1    rosuvastatin (CRESTOR) 10 MG Tab Take 1 Tablet by mouth every day for 360 days. 90 Tablet 3    losartan-hydrochlorothiazide (HYZAAR) 100-25 MG per tablet Take 1 Tablet by mouth every day for 360 days. 90 Tablet 3    finasteride (PROSCAR) 5 MG Tab Take 1 Tablet by mouth every day for 360 days. 90 Tablet 3    ketoconazole (NIZORAL) 2 % Cream ketoconazole 2 % topical cream   APPLY CREAM TOPICALLY TO AFFECTED AREA ON THE SKIN TWICE DAILY      fexofenadine (ALLEGRA) 60 MG Tab Take 60 mg by mouth every day.      multivitamin (THERAGRAN) Tab Take 1 Tab by mouth every day.          Current Outpatient Medications on File Prior to Visit   Medication Sig Dispense Refill    diphenhydrAMINE-APAP, sleep, (TYLENOL PM EXTRA STRENGTH)  MG Tab Take 1 Tablet by mouth at bedtime as  "needed.      Potassium 99 MG Tab Take 0.5 Tablets by mouth every day.      tamsulosin (FLOMAX) 0.4 MG capsule TAKE 2 CAPSULES BY MOUTH ONCE DAILY AS DIRECTED AFTER A MEAL IN THE EVENING      gabapentin (NEURONTIN) 100 MG Cap Take 1-2 tabs po at bedtime as needed for pain 60 Capsule 1    rosuvastatin (CRESTOR) 10 MG Tab Take 1 Tablet by mouth every day for 360 days. 90 Tablet 3    losartan-hydrochlorothiazide (HYZAAR) 100-25 MG per tablet Take 1 Tablet by mouth every day for 360 days. 90 Tablet 3    finasteride (PROSCAR) 5 MG Tab Take 1 Tablet by mouth every day for 360 days. 90 Tablet 3    ketoconazole (NIZORAL) 2 % Cream ketoconazole 2 % topical cream   APPLY CREAM TOPICALLY TO AFFECTED AREA ON THE SKIN TWICE DAILY      fexofenadine (ALLEGRA) 60 MG Tab Take 60 mg by mouth every day.      multivitamin (THERAGRAN) Tab Take 1 Tab by mouth every day.      fluticasone (FLONASE) 50 MCG/ACT nasal spray Spray 1 Spray in nose every day.       No current facility-administered medications on file prior to visit.         EXAMINATION     Physical Exam:   /72 (BP Location: Right arm, Patient Position: Sitting, BP Cuff Size: Adult)   Pulse 70   Temp 36.6 °C (97.8 °F) (Temporal)   Ht 1.803 m (5' 11\")   Wt (!) 130 kg (286 lb 6 oz)   SpO2 98%     Constitutional:   Body Habitus: Body mass index is 39.94 kg/m².  Cooperation: Fully cooperates with exam  Appearance: Well-groomed, well-nourished.    Eyes: No scleral icterus to suggest severe liver disease, no proptosis to suggest severe hyperthyroidism    ENT -no obvious auditory deficits, no noticeable facial droop     Skin -no rashes or lesions noted     Respiratory-  breathing comfortably on room air, no audible wheezing    Cardiovascular-distal extremities warm and well perfused.  No lower extremity edema is noted.     Gastrointestinal - no obvious abdominal masses, non-distended    Psychiatric- alert and oriented ×3. Normal affect.     Gait - normal gait, no use of " ambulatory device, nonantalgic.     Musculoskeletal and Neuro -       Thoracic/Lumbar Spine/Sacral Spine/Hips     Facet loading maneuver negative bilaterally     Palpation:   Tenderness to palpation over the bilateral lower lumbar facets and midline sacrum. No tenderness to palpation elsewhere in the low back/hips including paraspinal muscles bilaterally, sacroiliac joints bilaterally, PSIS bilaterally, and greater trochanters bilaterally.    SLR, ASYA, and FADIR negative bilaterally    Inspection: No evidence of atrophy in bilateral lower extremities throughout     Key points for the international standards for neurological classification of spinal cord injury (ISNCSCI) to light touch.   Dermatome R L   L2 2 2   L3 2 2   L4 2 2   L5 2 2   S1 2 2   S2 2 2         Motor Exam Lower Extremities  ? Myotome R L   Hip flexion L2 5 5   Knee extension L3 5 5   Ankle dorsiflexion L4 5 5   Toe extension L5 5 5   Ankle plantarflexion S1 5 5        Previous exam  Focal tenderness to palpation at left knee medial joint line, just inferior to left knee medial joint line, and supra patellar facet.      Full active range of motion with knee flexion and extension.  No tenderness to palpation elsewhere in left knee.    Lumbar spine /hip provocative exam maneuvers  Straight leg raise negative bilaterally  FADIR test negative bilaterally     SI joint tests  ASYA test negative bilaterally  Thigh thrust test negative bilaterally      Cervical spine   Inspection: No deformities of the skin over the cervical spine. No rashes or lesions.    limited active range of motion in all directions    Spurling's sign  negative bilaterally  Cervical facet loading maneuver  negative bilaterally    No signs of muscular atrophy in bilateral upper extremities     Tenderness to palpation at paracervical muscles bilaterally, cervical facets bilaterally, and upper trapezius bilaterally. No tenderness to palpation elsewhere including midline of cervical  spine.      Key points for the international standards for neurological classification of spinal cord injury (ISNCSCI) to light touch.     Dermatome R L   C4 2 2   C5 2 2   C6 1 1   C7 1 1   C8 1 1   T1 2 2   T2 2 2       Motor Exam Upper Extremities   ? Myotome R L   Shoulder abduction C5 5 5   Elbow flexion C5 5 5   Wrist extension C6 5 5   Elbow extension C7 5 5   Finger flexion C8 5 5   Finger abduction T1 5 5     Reflexes  ?  R L   Biceps  1+ 1+   Brachioradialis  1+ 1+     March's sign negative bilaterally     Bilateral hands:   Inspection: No swelling, deformities, or rashes. Symmetric appearing thenar and hyperthenar regions bilaterally.  Palpation: no significant tenderness to palpation throughout the bilateral hands  Range of motion is within normal limits throughout bilateral hands, fingers and wrist.    Special tests:  Tinel's at the wrist over the median nerve positive on left, negative on right  Carpal tunnel compression: negative bilaterally  Phalen's test: positive on left, negative on right  Tinel's at the cubital tunnel: negative bilaterally      MEDICAL DECISION MAKING    Medical records review: see under HPI section.     DATA    Labs: No new labs available for review since last visit.   Lab Results   Component Value Date/Time    SODIUM 136 12/08/2022 10:57 AM    POTASSIUM 4.0 12/08/2022 10:57 AM    CHLORIDE 98 12/08/2022 10:57 AM    CO2 24 12/08/2022 10:57 AM    ANION 14.0 12/08/2022 10:57 AM    GLUCOSE 120 (H) 12/08/2022 10:57 AM    BUN 16 12/08/2022 10:57 AM    CREATININE 0.87 12/08/2022 10:57 AM    CALCIUM 10.0 12/08/2022 10:57 AM    ASTSGOT 56 (H) 12/08/2022 10:57 AM    ALTSGPT 80 (H) 12/08/2022 10:57 AM    TBILIRUBIN 0.6 12/08/2022 10:57 AM    ALBUMIN 4.6 12/08/2022 10:57 AM    TOTPROTEIN 7.3 12/08/2022 10:57 AM    GLOBULIN 2.7 12/08/2022 10:57 AM    AGRATIO 1.7 12/08/2022 10:57 AM       No results found for: PROTHROMBTM, INR     Lab Results   Component Value Date/Time    WBC 8.9  08/14/2016 10:50 AM    RBC 5.13 08/14/2016 10:50 AM    HEMOGLOBIN 15.7 08/14/2016 10:50 AM    HEMATOCRIT 45.6 08/14/2016 10:50 AM    MCV 88.9 08/14/2016 10:50 AM    MCH 30.5 08/14/2016 10:50 AM    MCHC 34.3 08/14/2016 10:50 AM    MPV 7.9 08/14/2016 10:50 AM    NEUTSPOLYS 54.6 12/14/2012 08:52 AM    LYMPHOCYTES 32.8 12/14/2012 08:52 AM    MONOCYTES 10.0 (H) 12/14/2012 08:52 AM    EOSINOPHILS 2.3 12/14/2012 08:52 AM    BASOPHILS 0.3 12/14/2012 08:52 AM        Lab Results   Component Value Date/Time    HBA1C 6.2 (H) 08/05/2022 10:03 AM        Imaging:   I personally reviewed following images, these are my reads  MRI lumbar spine 11/11/22  Disc bulge most notable at L2-3, L3-4, and L4-5. Severe left  and mild right neuroforaminal stenosis at L4-5. Mild right neuroforaminal stenosis at L5-S1. Moderate bilateral lateral recess narrowing narrowing and moderate right foraminal narrowing at L3-4. See formal radiology report for further details.    X-ray left knee 11/26/2022  Patella fermin.  Quadriceps tendon enthesophyte.  Mild osteoarthritis at medial compartment.     XR left knee 1/7/2023  Mild tricompartmental osteoarthritis.  Appears slightly worse at medial compartment.  Alignment intact. See formal radiology report for further details.    MRI cervical spine 5/11/2023  Mild central canal stenosis at C5-6.  Moderate central canal stenosis at C4-5 with severe right and moderate left neuroforaminal stenosis at this level.  Moderate right neuroforaminal stenosis at C7-T1.  See formal radiology report for further details.    X-ray cervical spine 5/5/2023  Facet arthropathy of multiple levels. See formal radiology report for further details.    IMAGING radiology reads. I reviewed the following radiology reads     X-ray cervical spine 5/5/2023  FINDINGS:  The cervical vertebral bodies are normal in appearance and alignment is normal.  There is multilevel decreased disc height and endplate spurring. There is multilevel facet  degeneration most prominently seen at C4-5, C5-6 and C6-7.. There is minimal anterior subluxation of C2-3 and C3-4 with mild retrolisthesis at C4-5.  There is left-sided carotid arterial atherosclerotic plaque. There is some dorsal soft tissue ossification.     IMPRESSION:     1.  Multilevel degenerative disc disease and facet degeneration.     2.  Mild multilevel degenerative subluxation.    MRI cervical spine 5/11/2023  FINDINGS:  Cervical spine alignment is within normal limits. Vertebral body heights are preserved. Bone marrow signal intensity is within normal limits.  The craniocervical junction is normal in appearance. The included portion of the posterior fossa is within normal limits.        Axial cervical spine levels:     C2-3: Endplate disc osteophyte complex mildly encroaches upon the spinal canal. There is no significant canal stenosis or foraminal narrowing.     C3-4: Endplate disc osteophyte complex with bilateral facet degenerative change slightly worse on the left side. There is no significant canal narrowing. There is no significant foraminal narrowing.     C4-5: Endplate disc osteophyte complex with bilateral uncovertebral degeneration and facet degeneration slightly worse on the right side. There is severe right and moderate left foraminal narrowing. There is also moderate canal narrowing, asymmetrically   greater along the left paracentral aspect with slight cord deformity.     C5-C6: Endplate disc osteophyte complex with bilateral uncovertebral degenerative change. There is mild right foraminal narrowing and mild canal narrowing.     C6-7: Endplate disc osteophyte complex and bilateral uncovertebral degeneration, slightly greater on the right side. There is mild right foraminal narrowing without significant canal stenosis.     C7-T1: Endplate disc osteophyte complex with right-sided uncovertebral degeneration noted. There is moderate right foraminal narrowing.     The prevertebral soft tissues  are within normal limits.        IMPRESSION:        Moderate canal stenosis at C4-5 with cord impingement. There is also severe right and moderate left foraminal narrowing at this level.     There is mild canal narrowing at C5-6.     Moderate right foraminal narrowing also noted at C7-T1.     No definite spinal cord signal abnormality is identified.    X-ray left knee 1/7/2023  FINDINGS:  Bone density is normal. There is no evidence of fracture or dislocation. There is tricompartment osteoarthritis characterized by osteophytic spurring. There is a small joint effusion.     IMPRESSION:     Mild tricompartment osteoarthritis.                         Results for orders placed in visit on 11/11/22     MR-LUMBAR SPINE-W/O     Impression  Mild lumbar spine degenerative changes. Type I marrow changes are noted to the adjacent L3-L4 endplates.     There is moderate right-sided foraminal narrowing at the L3-4 level with impingement upon the exiting right L3 nerve     Severe left foraminal narrowing at L4-5 with L4 nerve impingement.     Partially visualized is a mass involving the right renal upper pole. Recommend additional evaluation with a CT of the abdomen and pelvis with contrast, renal protocol.     These unexpected findings were communicated to the ordering provider by Epic inbasket message at 5:14 PM on 11/11/2022.               X-ray left knee 11/26/2022  FINDINGS:  No acute fracture or dislocation.     Mild osteoarthritis     Small knee joint effusion.     IMPRESSION:        1. No acute osseous abnormality.    Diagnosis  Visit Diagnoses     ICD-10-CM   1. Lumbar spondylosis  M47.816   2. Chronic pain of left knee  M25.562    G89.29   3. Primary osteoarthritis of left knee  M17.12   4. Cervicalgia  M54.2   5. Numbness and tingling of right arm  R20.0    R20.2   6. Numbness and tingling in left arm  R20.0    R20.2   7. Left lumbar radiculitis  M54.16   8. Chronic midline low back pain without sciatica  M54.50     G89.29                 ASSESSMENT AND PLAN:  Andrei Galeas (: 1945) is a male with left low back and left knee and medial calf pain that appears to have component of left lumbar radiculitis from severe left neuroforaminal stenosis at L4-5, with ongoing resolution of radiating calf pain after epidural.  Now with significant left knee pain that appears to be mediated by osteoarthritis.    Also with exam today notable for tenderness to palpation at bilateral lower lumbar facet joints with exam notable for positive lumbar facet loading maneuver bilaterally reproducing the patient's pain, suggestive of lumbar facetogenic pain.  Imaging shows lumbar spondylosis at bilateral lower lumbar levels.    No longer having pain radiating down his arm.     Andrei was seen today for follow-up.    Diagnoses and all orders for this visit:    Lumbar spondylosis  -     Referral to Pain Clinic    Chronic pain of left knee    Primary osteoarthritis of left knee    Cervicalgia    Numbness and tingling of right arm    Numbness and tingling in left arm    Left lumbar radiculitis    Chronic midline low back pain without sciatica        PLAN  Physical Therapy:  I have discussed my recommendation of the patient pursue physical therapy to maximize the likelihood of long-term pain relief for both his knee and back.  I gave him a handout of physical therapy exercises for low back pain and neck pain at a previous visit.  He has declined formal physical therapy referral noting that he would like to focus on injections first     Diagnostic workup:   no new imaging needed at this time  Personally reviewed at today's visit:  MRI cervical spine 2023, X-ray cervical spine 2023      Medications:   - continue OTC tylenol, OTC ibuprofen  -Discussed that he could consider continuing gabapentin per PCP discretion.  He does not feel that it is helping.    Interventions:   -Radiofrequency ablation of medial branches targeting  Bilateral L4-L5 and L5-S1 facet joints given 80% pain relief and improved ability to perform ADLs after diagnostic lumbar medial branch blocks #1 and #2 targeting Bilateral L4-L5 and L5-S1 facet joints. The risks, benefits, and alternatives to this procedure were discussed and the patient wishes to proceed with the procedure. Risks include but are not limited to damage to surrounding structures, infection, bleeding, worsening of pain which can be permanent, and weakness which can be permanent. Benefits include pain relief and improved function. Alternatives include not doing the procedure.    - interlaminar left L5-S1 epidural steroid injection PRN if radiating pain returns  -s/p synvisc One injection for left knee under ultrasound guidance with improvement in left knee pain    Follow-up: 3 weeks after procedure    Orders Placed This Encounter    Referral to Pain Clinic       Claudia Cervantes MD  Interventional Pain and Spine  Physical Medicine and Rehabilitation  [facility]      The above note documents my personal evaluation of this patient. In addition, I have reviewed and confirmed with the patient and MA the supportive information documented in today's Patient Health Questionnaire and Office Note.     Please note that this dictation was created using voice recognition software. I have made every reasonable attempt to correct obvious errors, but I expect that there are errors of grammar and possibly content that I did not discover before finalizing the note.

## 2023-06-07 NOTE — H&P (VIEW-ONLY)
Follow-up patient Note    Interventional Pain and Spine  Physiatry (Physical Medicine and Rehabilitation)     Patient Name: Andrei Galeas  : 1945  Date of service: 2023    Chief Complaint:   Chief Complaint   Patient presents with    Follow-Up     Chronic pain of left knee       HISTORY  Please see new patient note by Dr. Cervantes for more details.     HPI  Today's visit   Andrei Galeas ( 1945) is a RHD male with Diagnoses of Lumbar spondylosis, Chronic pain of left knee, Primary osteoarthritis of left knee, Cervicalgia, Numbness and tingling of right arm, Numbness and tingling in left arm, Left lumbar radiculitis, and Chronic midline low back pain without sciatica were pertinent to this visit.    Today Clyde presents after 23 diagnostic lumbar medial branch blocks targeting the bilateral L4-L5 and L5-S1 facet joints #2 with 80% improvement in pain and improvement in ability to do ADLs including sitting for a prolonged time. Still with ongoing low back pain that limits his ability to stand for prolonged time.  Pain severity 2-3/10 on NRS, feeling dull and achy at his midline and bilateral low back and sacral area.  He denies pain radiating down his legs or numbness or tingling or weakness in his legs.    Notes ongoing left knee pain that is still bothersome, but still slightly improved before.    No longer having radiating pain from the neck down the arm.     Taking tylenol or advil PRN for pain QHS with improvement in sleep. Has taken gabapentin 100-200mg QHS with no noticeable relief and no known unwanted SE.     Procedure history:  - 22 Lumbar Epidural Steroid Injection at the left L5-S1 level - 100% pain relief x 1 month, then started wearing off 1 week ago, now 20-30% better.  Denies radiating pain in legs at follow-up on 3/17/2023.  - 23 left knee steroid injection - 3 days of significant relief  - 3/24/23 left knee steroid injection - 3 days of significant  relief  - 05/05/23 Left knee Synvisc One injection - 82% pain relief  - 5/16/2023 diagnostic medial branch blocks targeting the BILATERAL L4-5 and L5-S1 facet joints with fluoroscopic guidance #1 - 80% improvement in pain, improvement in ability to do ADLs like standing, lying down, turning, transitioning between sit and stand.   - 6/1/23 diagnostic lumbar medial branch blocks targeting the bilateral L4-L5 and L5-S1 facet joints #2 - 80% improvement in pain and improvement in sitting for a prolonged time, standing, lying down      ROS:   Red Flags ROS:   Fever, Chills, Sweats: Denies  Involuntary Weight Loss: Denies  Bladder Incontinence: Denies  Bowel Incontinence: denies  Saddle Anesthesia: Denies    All other systems reviewed and negative.     PMHx:   Past Medical History:   Diagnosis Date    Arthritis     Back pain     Cataract     Depression     Diabetes (HCC)     borderline    Hypertension     Obstructive chronic bronchitis without exacerbation (HCC) 8/5/2022    Shortness of breath     Sinusitis     Sleep apnea     Wheezing        PSHx:   Past Surgical History:   Procedure Laterality Date    MN INJ DX/THER AGNT PARAVERT FACET JOINT, CELESTE* Bilateral 6/1/2023    Procedure: Diagnostic medial branch blocks targeting the BILATERAL L4-5 and L5-S1 facet joints with fluoroscopic guidance #2;  Surgeon: Claudia Cervantes M.D.;  [facility];  Service: Pain Management    INJ,ANES LUMBAR/CERVICAL Bilateral 5/16/2023    Procedure: Diagnostic medial branch blocks targeting the BILATERAL L4-5 and L5-S1 facet joints with fluoroscopic guidance #1;  Surgeon: Claudia Cervantes M.D.;  [facility];  Service: Pain Management    MN INJ LUMBAR/SACRAL,W/ IMAGING Left 12/20/2022    Procedure: LEFT lumbar L5-S1 interlaminar epidural steroid injection.;  Surgeon: Claudia Cervantes M.D.;  [facility]: [facility];  Service: Pain Management    SINUSOTOMIES         Family Hx:    Family History   Problem Relation Age of Onset    Heart Disease Paternal Uncle         MI    Arthritis Mother     Psychiatric Illness Father         stomach cancer    Hypertension Father     Hyperlipidemia Father     Stroke Father        Social Hx:  Social History     Socioeconomic History    Marital status:      Spouse name: Not on file    Number of children: Not on file    Years of education: Not on file    Highest education level: Not on file   Occupational History    Not on file   Tobacco Use    Smoking status: Former     Packs/day: 2.00     Years: 10.00     Pack years: 20.00     Types: Cigarettes     Quit date: 1977     Years since quittin.7    Smokeless tobacco: Never   Vaping Use    Vaping Use: Never used   Substance and Sexual Activity    Alcohol use: Yes     Comment: 1-2 drinks every week or two    Drug use: No    Sexual activity: Not on file   Other Topics Concern    Not on file   Social History Narrative    Not on file     Social Determinants of Health     Financial Resource Strain: Not on file   Food Insecurity: No Food Insecurity (2022)    Hunger Vital Sign     Worried About Running Out of Food in the Last Year: Never true     Ran Out of Food in the Last Year: Never true   Transportation Needs: No Transportation Needs (2022)    PRAPARE - Transportation     Lack of Transportation (Medical): No     Lack of Transportation (Non-Medical): No   Physical Activity: Inactive (2022)    Exercise Vital Sign     Days of Exercise per Week: 0 days     Minutes of Exercise per Session: 0 min   Stress: Stress Concern Present (2022)    [employer] - Occupational Stress Questionnaire     Feeling of Stress : To some extent   Social Connections: Socially Integrated (2022)    Social Connection and Isolation Panel [NHANES]     Frequency of Communication with Friends and Family: Three times a week     Frequency of Social Gatherings with Friends and Family: Once  a week     Attends Judaism Services: More than 4 times per year     Active Member of Clubs or Organizations: Yes     Attends Club or Organization Meetings: More than 4 times per year     Marital Status:    Intimate Partner Violence: Not on file   Housing Stability: Low Risk  (8/4/2022)    Housing Stability Vital Sign     Unable to Pay for Housing in the Last Year: No     Number of Places Lived in the Last Year: 1     Unstable Housing in the Last Year: No       Allergies:  Allergies   Allergen Reactions    Seasonal     Penicillins Unspecified     Childhood - unknown reaction       Medications: reviewed on epic.   Outpatient Medications Marked as Taking for the 6/7/23 encounter (Office Visit) with Claudia Cervantes M.D.   Medication Sig Dispense Refill    diphenhydrAMINE-APAP, sleep, (TYLENOL PM EXTRA STRENGTH)  MG Tab Take 1 Tablet by mouth at bedtime as needed.      Potassium 99 MG Tab Take 0.5 Tablets by mouth every day.      tamsulosin (FLOMAX) 0.4 MG capsule TAKE 2 CAPSULES BY MOUTH ONCE DAILY AS DIRECTED AFTER A MEAL IN THE EVENING      gabapentin (NEURONTIN) 100 MG Cap Take 1-2 tabs po at bedtime as needed for pain 60 Capsule 1    rosuvastatin (CRESTOR) 10 MG Tab Take 1 Tablet by mouth every day for 360 days. 90 Tablet 3    losartan-hydrochlorothiazide (HYZAAR) 100-25 MG per tablet Take 1 Tablet by mouth every day for 360 days. 90 Tablet 3    finasteride (PROSCAR) 5 MG Tab Take 1 Tablet by mouth every day for 360 days. 90 Tablet 3    ketoconazole (NIZORAL) 2 % Cream ketoconazole 2 % topical cream   APPLY CREAM TOPICALLY TO AFFECTED AREA ON THE SKIN TWICE DAILY      fexofenadine (ALLEGRA) 60 MG Tab Take 60 mg by mouth every day.      multivitamin (THERAGRAN) Tab Take 1 Tab by mouth every day.          Current Outpatient Medications on File Prior to Visit   Medication Sig Dispense Refill    diphenhydrAMINE-APAP, sleep, (TYLENOL PM EXTRA STRENGTH)  MG Tab Take 1 Tablet by mouth at bedtime as  "needed.      Potassium 99 MG Tab Take 0.5 Tablets by mouth every day.      tamsulosin (FLOMAX) 0.4 MG capsule TAKE 2 CAPSULES BY MOUTH ONCE DAILY AS DIRECTED AFTER A MEAL IN THE EVENING      gabapentin (NEURONTIN) 100 MG Cap Take 1-2 tabs po at bedtime as needed for pain 60 Capsule 1    rosuvastatin (CRESTOR) 10 MG Tab Take 1 Tablet by mouth every day for 360 days. 90 Tablet 3    losartan-hydrochlorothiazide (HYZAAR) 100-25 MG per tablet Take 1 Tablet by mouth every day for 360 days. 90 Tablet 3    finasteride (PROSCAR) 5 MG Tab Take 1 Tablet by mouth every day for 360 days. 90 Tablet 3    ketoconazole (NIZORAL) 2 % Cream ketoconazole 2 % topical cream   APPLY CREAM TOPICALLY TO AFFECTED AREA ON THE SKIN TWICE DAILY      fexofenadine (ALLEGRA) 60 MG Tab Take 60 mg by mouth every day.      multivitamin (THERAGRAN) Tab Take 1 Tab by mouth every day.      fluticasone (FLONASE) 50 MCG/ACT nasal spray Spray 1 Spray in nose every day.       No current facility-administered medications on file prior to visit.         EXAMINATION     Physical Exam:   /72 (BP Location: Right arm, Patient Position: Sitting, BP Cuff Size: Adult)   Pulse 70   Temp 36.6 °C (97.8 °F) (Temporal)   Ht 1.803 m (5' 11\")   Wt (!) 130 kg (286 lb 6 oz)   SpO2 98%     Constitutional:   Body Habitus: Body mass index is 39.94 kg/m².  Cooperation: Fully cooperates with exam  Appearance: Well-groomed, well-nourished.    Eyes: No scleral icterus to suggest severe liver disease, no proptosis to suggest severe hyperthyroidism    ENT -no obvious auditory deficits, no noticeable facial droop     Skin -no rashes or lesions noted     Respiratory-  breathing comfortably on room air, no audible wheezing    Cardiovascular-distal extremities warm and well perfused.  No lower extremity edema is noted.     Gastrointestinal - no obvious abdominal masses, non-distended    Psychiatric- alert and oriented ×3. Normal affect.     Gait - normal gait, no use of " ambulatory device, nonantalgic.     Musculoskeletal and Neuro -       Thoracic/Lumbar Spine/Sacral Spine/Hips     Facet loading maneuver negative bilaterally     Palpation:   Tenderness to palpation over the bilateral lower lumbar facets and midline sacrum. No tenderness to palpation elsewhere in the low back/hips including paraspinal muscles bilaterally, sacroiliac joints bilaterally, PSIS bilaterally, and greater trochanters bilaterally.    SLR, ASYA, and FADIR negative bilaterally    Inspection: No evidence of atrophy in bilateral lower extremities throughout     Key points for the international standards for neurological classification of spinal cord injury (ISNCSCI) to light touch.   Dermatome R L   L2 2 2   L3 2 2   L4 2 2   L5 2 2   S1 2 2   S2 2 2         Motor Exam Lower Extremities  ? Myotome R L   Hip flexion L2 5 5   Knee extension L3 5 5   Ankle dorsiflexion L4 5 5   Toe extension L5 5 5   Ankle plantarflexion S1 5 5        Previous exam  Focal tenderness to palpation at left knee medial joint line, just inferior to left knee medial joint line, and supra patellar facet.      Full active range of motion with knee flexion and extension.  No tenderness to palpation elsewhere in left knee.    Lumbar spine /hip provocative exam maneuvers  Straight leg raise negative bilaterally  FADIR test negative bilaterally     SI joint tests  ASYA test negative bilaterally  Thigh thrust test negative bilaterally      Cervical spine   Inspection: No deformities of the skin over the cervical spine. No rashes or lesions.    limited active range of motion in all directions    Spurling's sign  negative bilaterally  Cervical facet loading maneuver  negative bilaterally    No signs of muscular atrophy in bilateral upper extremities     Tenderness to palpation at paracervical muscles bilaterally, cervical facets bilaterally, and upper trapezius bilaterally. No tenderness to palpation elsewhere including midline of cervical  spine.      Key points for the international standards for neurological classification of spinal cord injury (ISNCSCI) to light touch.     Dermatome R L   C4 2 2   C5 2 2   C6 1 1   C7 1 1   C8 1 1   T1 2 2   T2 2 2       Motor Exam Upper Extremities   ? Myotome R L   Shoulder abduction C5 5 5   Elbow flexion C5 5 5   Wrist extension C6 5 5   Elbow extension C7 5 5   Finger flexion C8 5 5   Finger abduction T1 5 5     Reflexes  ?  R L   Biceps  1+ 1+   Brachioradialis  1+ 1+     March's sign negative bilaterally     Bilateral hands:   Inspection: No swelling, deformities, or rashes. Symmetric appearing thenar and hyperthenar regions bilaterally.  Palpation: no significant tenderness to palpation throughout the bilateral hands  Range of motion is within normal limits throughout bilateral hands, fingers and wrist.    Special tests:  Tinel's at the wrist over the median nerve positive on left, negative on right  Carpal tunnel compression: negative bilaterally  Phalen's test: positive on left, negative on right  Tinel's at the cubital tunnel: negative bilaterally      MEDICAL DECISION MAKING    Medical records review: see under HPI section.     DATA    Labs: No new labs available for review since last visit.   Lab Results   Component Value Date/Time    SODIUM 136 12/08/2022 10:57 AM    POTASSIUM 4.0 12/08/2022 10:57 AM    CHLORIDE 98 12/08/2022 10:57 AM    CO2 24 12/08/2022 10:57 AM    ANION 14.0 12/08/2022 10:57 AM    GLUCOSE 120 (H) 12/08/2022 10:57 AM    BUN 16 12/08/2022 10:57 AM    CREATININE 0.87 12/08/2022 10:57 AM    CALCIUM 10.0 12/08/2022 10:57 AM    ASTSGOT 56 (H) 12/08/2022 10:57 AM    ALTSGPT 80 (H) 12/08/2022 10:57 AM    TBILIRUBIN 0.6 12/08/2022 10:57 AM    ALBUMIN 4.6 12/08/2022 10:57 AM    TOTPROTEIN 7.3 12/08/2022 10:57 AM    GLOBULIN 2.7 12/08/2022 10:57 AM    AGRATIO 1.7 12/08/2022 10:57 AM       No results found for: PROTHROMBTM, INR     Lab Results   Component Value Date/Time    WBC 8.9  08/14/2016 10:50 AM    RBC 5.13 08/14/2016 10:50 AM    HEMOGLOBIN 15.7 08/14/2016 10:50 AM    HEMATOCRIT 45.6 08/14/2016 10:50 AM    MCV 88.9 08/14/2016 10:50 AM    MCH 30.5 08/14/2016 10:50 AM    MCHC 34.3 08/14/2016 10:50 AM    MPV 7.9 08/14/2016 10:50 AM    NEUTSPOLYS 54.6 12/14/2012 08:52 AM    LYMPHOCYTES 32.8 12/14/2012 08:52 AM    MONOCYTES 10.0 (H) 12/14/2012 08:52 AM    EOSINOPHILS 2.3 12/14/2012 08:52 AM    BASOPHILS 0.3 12/14/2012 08:52 AM        Lab Results   Component Value Date/Time    HBA1C 6.2 (H) 08/05/2022 10:03 AM        Imaging:   I personally reviewed following images, these are my reads  MRI lumbar spine 11/11/22  Disc bulge most notable at L2-3, L3-4, and L4-5. Severe left  and mild right neuroforaminal stenosis at L4-5. Mild right neuroforaminal stenosis at L5-S1. Moderate bilateral lateral recess narrowing narrowing and moderate right foraminal narrowing at L3-4. See formal radiology report for further details.    X-ray left knee 11/26/2022  Patella fermin.  Quadriceps tendon enthesophyte.  Mild osteoarthritis at medial compartment.     XR left knee 1/7/2023  Mild tricompartmental osteoarthritis.  Appears slightly worse at medial compartment.  Alignment intact. See formal radiology report for further details.    MRI cervical spine 5/11/2023  Mild central canal stenosis at C5-6.  Moderate central canal stenosis at C4-5 with severe right and moderate left neuroforaminal stenosis at this level.  Moderate right neuroforaminal stenosis at C7-T1.  See formal radiology report for further details.    X-ray cervical spine 5/5/2023  Facet arthropathy of multiple levels. See formal radiology report for further details.    IMAGING radiology reads. I reviewed the following radiology reads     X-ray cervical spine 5/5/2023  FINDINGS:  The cervical vertebral bodies are normal in appearance and alignment is normal.  There is multilevel decreased disc height and endplate spurring. There is multilevel facet  degeneration most prominently seen at C4-5, C5-6 and C6-7.. There is minimal anterior subluxation of C2-3 and C3-4 with mild retrolisthesis at C4-5.  There is left-sided carotid arterial atherosclerotic plaque. There is some dorsal soft tissue ossification.     IMPRESSION:     1.  Multilevel degenerative disc disease and facet degeneration.     2.  Mild multilevel degenerative subluxation.    MRI cervical spine 5/11/2023  FINDINGS:  Cervical spine alignment is within normal limits. Vertebral body heights are preserved. Bone marrow signal intensity is within normal limits.  The craniocervical junction is normal in appearance. The included portion of the posterior fossa is within normal limits.        Axial cervical spine levels:     C2-3: Endplate disc osteophyte complex mildly encroaches upon the spinal canal. There is no significant canal stenosis or foraminal narrowing.     C3-4: Endplate disc osteophyte complex with bilateral facet degenerative change slightly worse on the left side. There is no significant canal narrowing. There is no significant foraminal narrowing.     C4-5: Endplate disc osteophyte complex with bilateral uncovertebral degeneration and facet degeneration slightly worse on the right side. There is severe right and moderate left foraminal narrowing. There is also moderate canal narrowing, asymmetrically   greater along the left paracentral aspect with slight cord deformity.     C5-C6: Endplate disc osteophyte complex with bilateral uncovertebral degenerative change. There is mild right foraminal narrowing and mild canal narrowing.     C6-7: Endplate disc osteophyte complex and bilateral uncovertebral degeneration, slightly greater on the right side. There is mild right foraminal narrowing without significant canal stenosis.     C7-T1: Endplate disc osteophyte complex with right-sided uncovertebral degeneration noted. There is moderate right foraminal narrowing.     The prevertebral soft tissues  are within normal limits.        IMPRESSION:        Moderate canal stenosis at C4-5 with cord impingement. There is also severe right and moderate left foraminal narrowing at this level.     There is mild canal narrowing at C5-6.     Moderate right foraminal narrowing also noted at C7-T1.     No definite spinal cord signal abnormality is identified.    X-ray left knee 1/7/2023  FINDINGS:  Bone density is normal. There is no evidence of fracture or dislocation. There is tricompartment osteoarthritis characterized by osteophytic spurring. There is a small joint effusion.     IMPRESSION:     Mild tricompartment osteoarthritis.                         Results for orders placed in visit on 11/11/22     MR-LUMBAR SPINE-W/O     Impression  Mild lumbar spine degenerative changes. Type I marrow changes are noted to the adjacent L3-L4 endplates.     There is moderate right-sided foraminal narrowing at the L3-4 level with impingement upon the exiting right L3 nerve     Severe left foraminal narrowing at L4-5 with L4 nerve impingement.     Partially visualized is a mass involving the right renal upper pole. Recommend additional evaluation with a CT of the abdomen and pelvis with contrast, renal protocol.     These unexpected findings were communicated to the ordering provider by Epic inbasket message at 5:14 PM on 11/11/2022.               X-ray left knee 11/26/2022  FINDINGS:  No acute fracture or dislocation.     Mild osteoarthritis     Small knee joint effusion.     IMPRESSION:        1. No acute osseous abnormality.    Diagnosis  Visit Diagnoses     ICD-10-CM   1. Lumbar spondylosis  M47.816   2. Chronic pain of left knee  M25.562    G89.29   3. Primary osteoarthritis of left knee  M17.12   4. Cervicalgia  M54.2   5. Numbness and tingling of right arm  R20.0    R20.2   6. Numbness and tingling in left arm  R20.0    R20.2   7. Left lumbar radiculitis  M54.16   8. Chronic midline low back pain without sciatica  M54.50     G89.29                 ASSESSMENT AND PLAN:  Andrei Galeas (: 1945) is a male with left low back and left knee and medial calf pain that appears to have component of left lumbar radiculitis from severe left neuroforaminal stenosis at L4-5, with ongoing resolution of radiating calf pain after epidural.  Now with significant left knee pain that appears to be mediated by osteoarthritis.    Also with exam today notable for tenderness to palpation at bilateral lower lumbar facet joints with exam notable for positive lumbar facet loading maneuver bilaterally reproducing the patient's pain, suggestive of lumbar facetogenic pain.  Imaging shows lumbar spondylosis at bilateral lower lumbar levels.    No longer having pain radiating down his arm.     Andrei was seen today for follow-up.    Diagnoses and all orders for this visit:    Lumbar spondylosis  -     Referral to Pain Clinic    Chronic pain of left knee    Primary osteoarthritis of left knee    Cervicalgia    Numbness and tingling of right arm    Numbness and tingling in left arm    Left lumbar radiculitis    Chronic midline low back pain without sciatica        PLAN  Physical Therapy:  I have discussed my recommendation of the patient pursue physical therapy to maximize the likelihood of long-term pain relief for both his knee and back.  I gave him a handout of physical therapy exercises for low back pain and neck pain at a previous visit.  He has declined formal physical therapy referral noting that he would like to focus on injections first     Diagnostic workup:   no new imaging needed at this time  Personally reviewed at today's visit:  MRI cervical spine 2023, X-ray cervical spine 2023      Medications:   - continue OTC tylenol, OTC ibuprofen  -Discussed that he could consider continuing gabapentin per PCP discretion.  He does not feel that it is helping.    Interventions:   -Radiofrequency ablation of medial branches targeting  Bilateral L4-L5 and L5-S1 facet joints given 80% pain relief and improved ability to perform ADLs after diagnostic lumbar medial branch blocks #1 and #2 targeting Bilateral L4-L5 and L5-S1 facet joints. The risks, benefits, and alternatives to this procedure were discussed and the patient wishes to proceed with the procedure. Risks include but are not limited to damage to surrounding structures, infection, bleeding, worsening of pain which can be permanent, and weakness which can be permanent. Benefits include pain relief and improved function. Alternatives include not doing the procedure.    - interlaminar left L5-S1 epidural steroid injection PRN if radiating pain returns  -s/p synvisc One injection for left knee under ultrasound guidance with improvement in left knee pain    Follow-up: 3 weeks after procedure    Orders Placed This Encounter    Referral to Pain Clinic       Claudia Cervantes MD  Interventional Pain and Spine  Physical Medicine and Rehabilitation  [facility]      The above note documents my personal evaluation of this patient. In addition, I have reviewed and confirmed with the patient and MA the supportive information documented in today's Patient Health Questionnaire and Office Note.     Please note that this dictation was created using voice recognition software. I have made every reasonable attempt to correct obvious errors, but I expect that there are errors of grammar and possibly content that I did not discover before finalizing the note.

## 2023-06-23 ENCOUNTER — TELEPHONE (OUTPATIENT)
Dept: PHYSICAL MEDICINE AND REHAB | Facility: MEDICAL CENTER | Age: 78
End: 2023-06-23
Payer: MEDICARE

## 2023-06-23 NOTE — TELEPHONE ENCOUNTER
Called patient , Dr. Cervantes would like to move SP from 6/27 to 7/6 , LVM for patient to call office to confirm.     Called 6/23 Friday         Johanna

## 2023-07-06 ENCOUNTER — HOSPITAL ENCOUNTER (OUTPATIENT)
Facility: REHABILITATION | Age: 78
End: 2023-07-06
Attending: STUDENT IN AN ORGANIZED HEALTH CARE EDUCATION/TRAINING PROGRAM | Admitting: STUDENT IN AN ORGANIZED HEALTH CARE EDUCATION/TRAINING PROGRAM
Payer: MEDICARE

## 2023-07-06 ENCOUNTER — APPOINTMENT (OUTPATIENT)
Dept: RADIOLOGY | Facility: REHABILITATION | Age: 78
End: 2023-07-06
Attending: STUDENT IN AN ORGANIZED HEALTH CARE EDUCATION/TRAINING PROGRAM
Payer: MEDICARE

## 2023-07-06 VITALS
HEART RATE: 61 BPM | DIASTOLIC BLOOD PRESSURE: 74 MMHG | SYSTOLIC BLOOD PRESSURE: 153 MMHG | RESPIRATION RATE: 18 BRPM | BODY MASS INDEX: 39.81 KG/M2 | HEIGHT: 71 IN | OXYGEN SATURATION: 95 % | WEIGHT: 284.39 LBS | TEMPERATURE: 97.9 F

## 2023-07-06 PROCEDURE — 700111 HCHG RX REV CODE 636 W/ 250 OVERRIDE (IP)

## 2023-07-06 PROCEDURE — 64636 DESTROY L/S FACET JNT ADDL: CPT

## 2023-07-06 PROCEDURE — 99152 MOD SED SAME PHYS/QHP 5/>YRS: CPT

## 2023-07-06 PROCEDURE — 99153 MOD SED SAME PHYS/QHP EA: CPT

## 2023-07-06 PROCEDURE — 64635 DESTROY LUMB/SAC FACET JNT: CPT

## 2023-07-06 RX ORDER — LIDOCAINE HYDROCHLORIDE 10 MG/ML
INJECTION, SOLUTION EPIDURAL; INFILTRATION; INTRACAUDAL; PERINEURAL
Status: COMPLETED
Start: 2023-07-06 | End: 2023-07-06

## 2023-07-06 RX ORDER — MIDAZOLAM HYDROCHLORIDE 1 MG/ML
INJECTION INTRAMUSCULAR; INTRAVENOUS
Status: COMPLETED
Start: 2023-07-06 | End: 2023-07-06

## 2023-07-06 RX ADMIN — FENTANYL CITRATE 25 MCG: 50 INJECTION, SOLUTION INTRAMUSCULAR; INTRAVENOUS at 13:49

## 2023-07-06 RX ADMIN — LIDOCAINE HYDROCHLORIDE 10 ML: 10 INJECTION, SOLUTION EPIDURAL; INFILTRATION; INTRACAUDAL; PERINEURAL at 13:45

## 2023-07-06 RX ADMIN — MIDAZOLAM 1 MG: 1 INJECTION, SOLUTION INTRAMUSCULAR; INTRAVENOUS at 13:40

## 2023-07-06 RX ADMIN — FENTANYL CITRATE 50 MCG: 50 INJECTION, SOLUTION INTRAMUSCULAR; INTRAVENOUS at 13:40

## 2023-07-06 ASSESSMENT — PAIN DESCRIPTION - PAIN TYPE: TYPE: CHRONIC PAIN

## 2023-07-06 NOTE — OR SURGEON
Immediate Post OP Note    Pre-Op Diagnosis Codes:     * Lumbar spondylosis [M47.816]      Post-Op Diagnosis Codes:     * Lumbar spondylosis [M47.816]      Procedure(s):  RIGHT and LEFT radiofrequency neurotomies medial branch targeting the L4-5 and L5-S1 facet joints with fluoroscopic guidance and sedation, Plan for 80 °C for 90 seconds for each neurotomy - Wound Class: Clean    Surgeon(s):  Claudia Cervantes M.D.    Anesthesiologist/Type of Anesthesia:  No anesthesia staff entered./Local    Surgical Staff:  Circulator: Sosa Mishra R.N.  Scrub Person: Donna Jang  Radiology Technologist: Mary Vogel    Specimens removed if any:  * No specimens in log *    Estimated Blood Loss: None    Findings: None    Complications: None        7/6/2023 2:21 PM Claudia Cervantes M.D.

## 2023-07-06 NOTE — OP REPORT
Patient: Andrei Galeas 77 y.o. male MRN: [id number]     Date of Service: 7/6/2023     Physician/s: Claudia Cervantes MD    Pre-operative Diagnosis: Lumbar spondylosis, facet arthropathy.      Post-operative Diagnosis: Lumbar spondylosis, facet arthropathy.     Procedure: Medial Branch Radiofrequency neurotomy targeting the right and left L4-L5 and L5-S1 facet joint(s) with sedation.     Description of procedure:    The patient was not treated for radiculopathy at this time    The risks, benefits, and alternatives of the procedure were reviewed and discussed with the patient.  Written informed consent was freely obtained. A pre-procedural time-out was conducted by the physician verifying patient’s identity, procedure to be performed, procedure site and side, and allergy verification. Appropriate equipment was determined to be in place for the procedure.     Moderation sedation was achieved with Versed (1mg) and Fentanyl (75mcg). Monitoring of the patients vital signs and respiratory status was provided by trained independent registered nurse during the entire course of the procedures and under my supervision and recoded in the patient’s medical record. The duration of sedation was over 10 minutes.    The patient's vital signs were carefully monitored before, throughout, and after the procedure.     In the fluoroscopy suite the patient was placed in a prone position, and a pillow was placed underneath the level of the umbilicus. The skin was prepped and draped in the usual sterile fashion. The fluoroscope was placed over the low back at the appropriate angles, and the targets for needle/probe placement were marked. A 25g needle was placed into each of the markings at three levels, and approx 1cc of 1% Lidocaine was injected subcutaneously into the epidermal and dermal layers. The needle was removed.     A 18 gauge, 15 cm RFN cannula with a 10 mm active tip was then placed into the skin using fluoroscopic guidance and  advanced with an oblique view towards the intersection of the transverse process and superior articular process L4-5 facet joint where the L3 medial branch runs on the right side. The needle/probe tips were then verified by AP, oblique, and lateral views.     A 18 gauge, 15 cm RFN cannula with a 10 mm active tip was then placed into the skin using fluoroscopic guidance and advanced with an oblique view towards the intersection of the transverse process and superior articular process L5-S1 facet joint where the L4 medial branch runs on the right side. The needle/probe tips were then verified by AP, oblique, and lateral views.     A 18 gauge, 15 cm RFN cannula with a 10 mm active tip was then placed into the skin using fluoroscopic guidance and advanced with an oblique view towards the intersection of the transverse process and superior articular process S1 facet where the L5 dorsal ramus runs on the right side. The needle/probe tips were then verified by AP, oblique, and lateral views.     Motor stimulation is used as an extra precaution to ensure the needle tips are off the lumbar nerve roots prior to each lesion. Following negative aspiration, 3cc of 1% lidocaine was injected at each of the above locations. The needles are not moved, but fluoroscope guidance is used to ensure the needles have not moved. After a wait period of approximately 2 minutes, a radiofrequency lesion was then created at each level with a temperature of 80 degrees centigrade for 90 seconds.     The probes were adjusted to a 2nd location and images were saved in 2+ views. Motor testing was done which confirmed no twitching in the leg and a 2nd radiofrequency lesion was made with 80°C for 90 seconds.      The cannulas were restyletted, and were then removed intact.     Then attention was turned to the left side.    A 18 gauge, 15 cm RFN cannula with a 10 mm active tip was then placed into the skin using fluoroscopic guidance and advanced with  an oblique view towards the intersection of the transverse process and superior articular process L4-5 facet joint where the L3 medial branch runs on the left side. The needle/probe tips were then verified by AP, oblique, and lateral views.     A 18 gauge, 15 cm RFN cannula with a 10 mm active tip was then placed into the skin using fluoroscopic guidance and advanced with an oblique view towards the intersection of the transverse process and superior articular process L5-S1 facet joint where the L4 medial branch runs on the left side. The needle/probe tips were then verified by AP, oblique, and lateral views.     Then A 18 gauge, 15 cm RFN cannula with a 10 mm active tip was then placed into the skin using fluoroscopic guidance and advanced with an oblique view towards the intersection of the transverse process and superior articular process S1 facet where the L5 dorsal ramus runs on the left side. The needle/probe tips were then verified by AP, oblique, and lateral views.     Motor stimulation is used as an extra precaution to ensure the needle tips are off the lumbar nerve roots prior to each lesion. Following negative aspiration, 3cc of 1% Lidocaine was injected at each of the above levels. The needles are not moved, and fluoroscope guidance is used to ensure the needles have not moved. After a wait period of approximately 2 minutes, a radiofrequency lesion was then created at each level with a temperature of 80 degrees centigrade for 90 seconds.     The probes were adjusted to a 2nd location and images were saved in 2+ views. Motor testing was done which confirmed no twitching in the leg and a 2nd radiofrequency lesion was made of 80 °C for 90 seconds.    The cannulas were restyletted, and were then removed intact.     Fluoroscopic images in AP and lateral view were saved prior to each radiofrequency neurotomy.    The patient's back was covered with a 4x4 gauze, the area was cleansed with sterile normal saline,  and a dressing was applied. There were no complications noted, the patient remained hemodynamically stable, and the patient tolerated the procedure well. The patient was examined in the postoperative area and the strength exam was identical as prior to the procedure.    Follow-up as scheduled    Claudia Cervantes MD  Interventional Pain and Spine  Physical Medicine and Rehabilitation  [facility]        CPT  Radiofrequency ablation (RFA) - lumbar or sacral (1st joint):  55921-51  Radiofrequency ablation (RFA) - lumbar or sacral (each additional joint):  71440-66  moderate procedural sedation first 15 minutes: 61420  moderate procedrual sedation, additional 15 minutes: 92166

## 2023-07-06 NOTE — INTERVAL H&P NOTE
H&P reviewed. The patient was examined and there are no changes to the H&P    Claudia Cervantes MD  Interventional Pain and Spine  Physical Medicine and Rehabilitation  [facility]

## 2023-07-06 NOTE — PROGRESS NOTES
1412: Rec'd pt from procedure room, pt ambulatory to chair, steady on feet, tolerating liquids. Dressing CDI.  Ice pack placed to incision site.    1422: Dr. Cervantes in to see patient, meets D/C criteria.    1430: IV d/c'd, Patient d/c'd to designated , placed in passenger seat.

## 2023-07-06 NOTE — PROGRESS NOTES
1320: Dr. Cervantes into see patient, questions answered, d/c instructions given with understanding.

## 2023-07-19 DIAGNOSIS — I10 PRIMARY HYPERTENSION: ICD-10-CM

## 2023-07-19 RX ORDER — LOSARTAN POTASSIUM AND HYDROCHLOROTHIAZIDE 25; 100 MG/1; MG/1
1 TABLET ORAL DAILY
Qty: 100 TABLET | Refills: 1 | Status: SHIPPED | OUTPATIENT
Start: 2023-07-19 | End: 2024-01-04 | Stop reason: SDUPTHER

## 2023-07-26 ENCOUNTER — OFFICE VISIT (OUTPATIENT)
Dept: PHYSICAL MEDICINE AND REHAB | Facility: MEDICAL CENTER | Age: 78
End: 2023-07-26
Payer: MEDICARE

## 2023-07-26 VITALS
HEIGHT: 71 IN | DIASTOLIC BLOOD PRESSURE: 76 MMHG | WEIGHT: 289.68 LBS | TEMPERATURE: 97.2 F | OXYGEN SATURATION: 94 % | BODY MASS INDEX: 40.56 KG/M2 | HEART RATE: 71 BPM | SYSTOLIC BLOOD PRESSURE: 146 MMHG

## 2023-07-26 DIAGNOSIS — M17.12 PRIMARY OSTEOARTHRITIS OF LEFT KNEE: ICD-10-CM

## 2023-07-26 DIAGNOSIS — R20.2 NUMBNESS AND TINGLING IN LEFT ARM: ICD-10-CM

## 2023-07-26 DIAGNOSIS — G89.29 CHRONIC PAIN OF LEFT KNEE: ICD-10-CM

## 2023-07-26 DIAGNOSIS — G89.29 CHRONIC MIDLINE LOW BACK PAIN WITHOUT SCIATICA: ICD-10-CM

## 2023-07-26 DIAGNOSIS — M47.816 LUMBAR SPONDYLOSIS: ICD-10-CM

## 2023-07-26 DIAGNOSIS — R20.0 NUMBNESS AND TINGLING IN LEFT ARM: ICD-10-CM

## 2023-07-26 DIAGNOSIS — M54.50 CHRONIC MIDLINE LOW BACK PAIN WITHOUT SCIATICA: ICD-10-CM

## 2023-07-26 DIAGNOSIS — M54.16 LEFT LUMBAR RADICULITIS: ICD-10-CM

## 2023-07-26 DIAGNOSIS — M25.562 CHRONIC PAIN OF LEFT KNEE: ICD-10-CM

## 2023-07-26 DIAGNOSIS — R20.2 NUMBNESS AND TINGLING OF RIGHT ARM: ICD-10-CM

## 2023-07-26 DIAGNOSIS — M54.2 CERVICALGIA: ICD-10-CM

## 2023-07-26 DIAGNOSIS — R20.0 NUMBNESS AND TINGLING OF RIGHT ARM: ICD-10-CM

## 2023-07-26 PROCEDURE — 99213 OFFICE O/P EST LOW 20 MIN: CPT | Performed by: STUDENT IN AN ORGANIZED HEALTH CARE EDUCATION/TRAINING PROGRAM

## 2023-07-26 PROCEDURE — 3077F SYST BP >= 140 MM HG: CPT | Performed by: STUDENT IN AN ORGANIZED HEALTH CARE EDUCATION/TRAINING PROGRAM

## 2023-07-26 PROCEDURE — 3078F DIAST BP <80 MM HG: CPT | Performed by: STUDENT IN AN ORGANIZED HEALTH CARE EDUCATION/TRAINING PROGRAM

## 2023-07-26 PROCEDURE — 1125F AMNT PAIN NOTED PAIN PRSNT: CPT | Performed by: STUDENT IN AN ORGANIZED HEALTH CARE EDUCATION/TRAINING PROGRAM

## 2023-07-26 ASSESSMENT — PAIN SCALES - GENERAL: PAINLEVEL: 5=MODERATE PAIN

## 2023-07-26 ASSESSMENT — PATIENT HEALTH QUESTIONNAIRE - PHQ9
SUM OF ALL RESPONSES TO PHQ QUESTIONS 1-9: 7
CLINICAL INTERPRETATION OF PHQ2 SCORE: 2
5. POOR APPETITE OR OVEREATING: 0 - NOT AT ALL

## 2023-07-26 NOTE — PROGRESS NOTES
Follow-up patient Note    Interventional Pain and Spine  Physiatry (Physical Medicine and Rehabilitation)     Patient Name: Andrei Galeas  : 1945  Date of service: 2023    Chief Complaint:   Chief Complaint   Patient presents with    Follow-Up     Lumbar spondylosis       HISTORY  Please see new patient note by Dr. Cervantes for more details.     HPI  Today's visit   Andrei Galeas ( 1945) is a RHD male with Diagnoses of Lumbar spondylosis, Chronic pain of left knee, Primary osteoarthritis of left knee, Cervicalgia, Numbness and tingling of right arm, Numbness and tingling in left arm, Left lumbar radiculitis, and Chronic midline low back pain without sciatica were pertinent to this visit.    Today Clyde presents after 23 Radiofrequency ablation of medial branches targeting Bilateral L4-L5 and L5-S1 facet joints with 100% relief of index pain at his bilateral lumbar facet joints.  He notes that his area of worst pain currently is at his right sacroiliac area.  This pain worsens with prolonged standing.  Pain severity currently 5/10 on NRS at this area.  Feels dull and achy. He denies pain radiating down his legs or numbness or tingling or weakness in his legs.  At this time he feels his pain is overall tolerable and he does not feel that he needs injections.    No longer having radiating pain from the neck down the arm.     Alternates taking tylenol or advil PRN for pain QHS with improvement in sleep. Has taken gabapentin 100-200mg QHS with no noticeable relief and no known unwanted SE.     Procedure history:  - 22 Lumbar Epidural Steroid Injection at the left L5-S1 level - 100% pain relief x 1 month, then started wearing off 1 week ago, now 20-30% better.  Denies radiating pain in legs at follow-up on 3/17/2023.  - 23 left knee steroid injection - 3 days of significant relief  - 3/24/23 left knee steroid injection - 3 days of significant relief  - 23 Left knee  Synvisc One injection - 82% pain relief  - 5/16/2023 diagnostic medial branch blocks targeting the BILATERAL L4-5 and L5-S1 facet joints with fluoroscopic guidance #1 - 80% improvement in pain, improvement in ability to do ADLs like standing, lying down, turning, transitioning between sit and stand.   - 6/1/23 diagnostic lumbar medial branch blocks targeting the bilateral L4-L5 and L5-S1 facet joints #2 - 80% improvement in pain and improvement in sitting for a prolonged time, standing, lying down  - 7/6/23 Radiofrequency ablation of medial branches targeting Bilateral L4-L5 and L5-S1 facet joints - 100% relief of index pain at his bilateral lumbar facet joints.      ROS:   Red Flags ROS:   Fever, Chills, Sweats: Denies  Involuntary Weight Loss: Denies  Bladder Incontinence: Denies  Bowel Incontinence: denies  Saddle Anesthesia: Denies    All other systems reviewed and negative.     PMHx:   Past Medical History:   Diagnosis Date    Arthritis     Back pain     Cataract     Depression     Diabetes ([facility])     borderline    Hypertension     Obstructive chronic bronchitis without exacerbation ([facility]) 8/5/2022    Shortness of breath     Sinusitis     Sleep apnea     Wheezing        PSHx:   Past Surgical History:   Procedure Laterality Date    HI DSTR NROLYTC AGNT PARVERTEB FCT SNGL LMBR/SACRAL Bilateral 7/6/2023    Procedure: RIGHT and LEFT radiofrequency neurotomies medial branch targeting the L4-5 and L5-S1 facet joints with fluoroscopic guidance and sedation, Plan for 80 °C for 90 seconds for each neurotomy;  Surgeon: Claudia Cervantes M.D.;  [facility];  Service: Pain Management    HI INJ DX/THER AGNT PARAVERT FACET JOINT, CELESTE* Bilateral 6/1/2023    Procedure: Diagnostic medial branch blocks targeting the BILATERAL L4-5 and L5-S1 facet joints with fluoroscopic guidance #2;  Surgeon: Claudia Cervantes M.D.;  Location[facility];  Service: Pain Management    INJ,ANES  LUMBAR/CERVICAL Bilateral 2023    Procedure: Diagnostic medial branch blocks targeting the BILATERAL L4-5 and L5-S1 facet joints with fluoroscopic guidance #1;  Surgeon: Claudia Cervantes M.D.;  [facility];  Service: Pain Management    [place] INJ LUMBAR/SACRAL,W/ IMAGING Left 2022    Procedure: LEFT lumbar L5-S1 interlaminar epidural steroid injection.;  Surgeon: Claudia Cervantes M.D.;  [facility];  Service: Pain Management    SINUSOTOMIES         Family Hx:   Family History   Problem Relation Age of Onset    Heart Disease Paternal Uncle         MI    Arthritis Mother     Psychiatric Illness Father         stomach cancer    Hypertension Father     Hyperlipidemia Father     Stroke Father        Social Hx:  Social History     Socioeconomic History    Marital status:      Spouse name: Not on file    Number of children: Not on file    Years of education: Not on file    Highest education level: Not on file   Occupational History    Not on file   Tobacco Use    Smoking status: Former     Packs/day: 2.00     Years: 10.00     Pack years: 20.00     Types: Cigarettes     Quit date: 1977     Years since quittin.8    Smokeless tobacco: Never   Vaping Use    Vaping Use: Never used   Substance and Sexual Activity    Alcohol use: Yes     Comment: 1-2 drinks every week or two    Drug use: No    Sexual activity: Not on file   Other Topics Concern    Not on file   Social History Narrative    Not on file     Social Determinants of Health     Financial Resource Strain: Not on file   Food Insecurity: No Food Insecurity (2022)    Hunger Vital Sign     Worried About Running Out of Food in the Last Year: Never true     Ran Out of Food in the Last Year: Never true   Transportation Needs: No Transportation Needs (2022)    PRAPARE - Transportation     Lack of Transportation (Medical): No     Lack of Transportation (Non-Medical): No   Physical Activity:  Inactive (8/4/2022)    Exercise Vital Sign     Days of Exercise per Week: 0 days     Minutes of Exercise per Session: 0 min   Stress: Stress Concern Present (8/4/2022)    [employer] [place] [employer] - Occupational Stress Questionnaire     Feeling of Stress : To some extent   Social Connections: Socially Integrated (8/4/2022)    Social Connection and Isolation Panel [NHANES]     Frequency of Communication with Friends and Family: Three times a week     Frequency of Social Gatherings with Friends and Family: Once a week     Attends Latter-day Services: More than 4 times per year     Active Member of Clubs or Organizations: Yes     Attends Club or Organization Meetings: More than 4 times per year     Marital Status:    Intimate Partner Violence: Not on file   Housing Stability: Low Risk  (8/4/2022)    Housing Stability Vital Sign     Unable to Pay for Housing in the Last Year: No     Number of Places Lived in the Last Year: 1     Unstable Housing in the Last Year: No       Allergies:  Allergies   Allergen Reactions    Seasonal     Penicillins Unspecified     Childhood - unknown reaction       Medications: reviewed on epic.   Outpatient Medications Marked as Taking for the 7/26/23 encounter (Office Visit) with Claudia Cervantes M.D.   Medication Sig Dispense Refill    losartan-hydrochlorothiazide (HYZAAR) 100-25 MG per tablet Take 1 Tablet by mouth every day. 100 Tablet 1    tamsulosin (FLOMAX) 0.4 MG capsule TAKE 2 CAPSULES BY MOUTH ONCE DAILY AS DIRECTED AFTER A MEAL IN THE EVENING 180 Capsule 1    finasteride (PROSCAR) 5 MG Tab Take 1 tablet by mouth once daily 90 Tablet 1    diphenhydrAMINE-APAP, sleep, (TYLENOL PM EXTRA STRENGTH)  MG Tab Take 1 Tablet by mouth at bedtime as needed.      gabapentin (NEURONTIN) 100 MG Cap Take 1-2 tabs po at bedtime as needed for pain 60 Capsule 1    rosuvastatin (CRESTOR) 10 MG Tab Take 1 Tablet by mouth every day for 360 days. 90 Tablet 3    ketoconazole  "(NIZORAL) 2 % Cream ketoconazole 2 % topical cream   APPLY CREAM TOPICALLY TO AFFECTED AREA ON THE SKIN TWICE DAILY      fexofenadine (ALLEGRA) 60 MG Tab Take 60 mg by mouth every day.      multivitamin (THERAGRAN) Tab Take 1 Tab by mouth every day.      fluticasone (FLONASE) 50 MCG/ACT nasal spray Spray 1 Spray in nose every day.          Current Outpatient Medications on File Prior to Visit   Medication Sig Dispense Refill    losartan-hydrochlorothiazide (HYZAAR) 100-25 MG per tablet Take 1 Tablet by mouth every day. 100 Tablet 1    tamsulosin (FLOMAX) 0.4 MG capsule TAKE 2 CAPSULES BY MOUTH ONCE DAILY AS DIRECTED AFTER A MEAL IN THE EVENING 180 Capsule 1    finasteride (PROSCAR) 5 MG Tab Take 1 tablet by mouth once daily 90 Tablet 1    diphenhydrAMINE-APAP, sleep, (TYLENOL PM EXTRA STRENGTH)  MG Tab Take 1 Tablet by mouth at bedtime as needed.      gabapentin (NEURONTIN) 100 MG Cap Take 1-2 tabs po at bedtime as needed for pain 60 Capsule 1    rosuvastatin (CRESTOR) 10 MG Tab Take 1 Tablet by mouth every day for 360 days. 90 Tablet 3    ketoconazole (NIZORAL) 2 % Cream ketoconazole 2 % topical cream   APPLY CREAM TOPICALLY TO AFFECTED AREA ON THE SKIN TWICE DAILY      fexofenadine (ALLEGRA) 60 MG Tab Take 60 mg by mouth every day.      multivitamin (THERAGRAN) Tab Take 1 Tab by mouth every day.      fluticasone (FLONASE) 50 MCG/ACT nasal spray Spray 1 Spray in nose every day.       No current facility-administered medications on file prior to visit.         EXAMINATION     Physical Exam:   BP (!) 146/76 (BP Location: Right arm, Patient Position: Sitting, BP Cuff Size: Adult)   Pulse 71   Temp 36.2 °C (97.2 °F) (Temporal)   Ht 1.803 m (5' 11\")   Wt (!) 131 kg (289 lb 11 oz)   SpO2 94%     Constitutional:   Body Habitus: Body mass index is 40.4 kg/m².  Cooperation: Fully cooperates with exam  Appearance: Well-groomed, well-nourished.    Eyes: No scleral icterus to suggest severe liver disease, no " proptosis to suggest severe hyperthyroidism    ENT -no obvious auditory deficits, no noticeable facial droop     Skin -no rashes or lesions noted     Respiratory-  breathing comfortably on room air, no audible wheezing    Cardiovascular-distal extremities warm and well perfused.  No lower extremity edema is noted.     Gastrointestinal - no obvious abdominal masses, non-distended    Psychiatric- alert and oriented ×3. Normal affect.     Gait - normal gait, no use of ambulatory device, nonantalgic.     Musculoskeletal and Neuro -       Thoracic/Lumbar Spine/Sacral Spine/Hips     Facet loading maneuver negative bilaterally     Palpation:   Tenderness to palpation over the right sacroiliac joint area.  No tenderness to palpation elsewhere in the low back/hips including lumbar facets bilaterally, paraspinal muscles bilaterally, sacroiliac joints bilaterally, PSIS bilaterally, and greater trochanters bilaterally.    Lumbar spine /hip provocative exam maneuvers  Straight leg raise negative bilaterally  FADIR test negative bilaterally     SI joint tests  ASYA test negative bilaterally  Thigh thrust test negative bilaterally  Sacral distraction test negative bilaterally   Sacral thrust test negative bilaterally   Yeomans test negative bilaterally   Sacral compression test positive on right, negative on left    Inspection: No evidence of atrophy in bilateral lower extremities throughout     Key points for the international standards for neurological classification of spinal cord injury (ISNCSCI) to light touch.   Dermatome R L   L2 2 2   L3 2 2   L4 2 2   L5 2 2   S1 2 2   S2 2 2         Motor Exam Lower Extremities  ? Myotome R L   Hip flexion L2 5 5   Knee extension L3 5 5   Ankle dorsiflexion L4 5 5   Toe extension L5 5 5   Ankle plantarflexion S1 5 5        Previous exam  Focal tenderness to palpation at left knee medial joint line, just inferior to left knee medial joint line, and supra patellar facet.      Full active  range of motion with knee flexion and extension.  No tenderness to palpation elsewhere in left knee.          Cervical spine   Inspection: No deformities of the skin over the cervical spine. No rashes or lesions.    limited active range of motion in all directions    Spurling's sign  negative bilaterally  Cervical facet loading maneuver  negative bilaterally    No signs of muscular atrophy in bilateral upper extremities     Tenderness to palpation at paracervical muscles bilaterally, cervical facets bilaterally, and upper trapezius bilaterally. No tenderness to palpation elsewhere including midline of cervical spine.      Key points for the international standards for neurological classification of spinal cord injury (ISNCSCI) to light touch.     Dermatome R L   C4 2 2   C5 2 2   C6 1 1   C7 1 1   C8 1 1   T1 2 2   T2 2 2       Motor Exam Upper Extremities   ? Myotome R L   Shoulder abduction C5 5 5   Elbow flexion C5 5 5   Wrist extension C6 5 5   Elbow extension C7 5 5   Finger flexion C8 5 5   Finger abduction T1 5 5     Reflexes  ?  R L   Biceps  1+ 1+   Brachioradialis  1+ 1+     March's sign negative bilaterally     Bilateral hands:   Inspection: No swelling, deformities, or rashes. Symmetric appearing thenar and hyperthenar regions bilaterally.  Palpation: no significant tenderness to palpation throughout the bilateral hands  Range of motion is within normal limits throughout bilateral hands, fingers and wrist.    Special tests:  Tinel's at the wrist over the median nerve positive on left, negative on right  Carpal tunnel compression: negative bilaterally  Phalen's test: positive on left, negative on right  Tinel's at the cubital tunnel: negative bilaterally      MEDICAL DECISION MAKING    Medical records review: see under HPI section.     DATA    Labs: No new labs available for review since last visit.   Lab Results   Component Value Date/Time    SODIUM 136 12/08/2022 10:57 AM    POTASSIUM 4.0 12/08/2022  10:57 AM    CHLORIDE 98 12/08/2022 10:57 AM    CO2 24 12/08/2022 10:57 AM    ANION 14.0 12/08/2022 10:57 AM    GLUCOSE 120 (H) 12/08/2022 10:57 AM    BUN 16 12/08/2022 10:57 AM    CREATININE 0.87 12/08/2022 10:57 AM    CALCIUM 10.0 12/08/2022 10:57 AM    ASTSGOT 56 (H) 12/08/2022 10:57 AM    ALTSGPT 80 (H) 12/08/2022 10:57 AM    TBILIRUBIN 0.6 12/08/2022 10:57 AM    ALBUMIN 4.6 12/08/2022 10:57 AM    TOTPROTEIN 7.3 12/08/2022 10:57 AM    GLOBULIN 2.7 12/08/2022 10:57 AM    AGRATIO 1.7 12/08/2022 10:57 AM       No results found for: PROTHROMBTM, INR     Lab Results   Component Value Date/Time    WBC 8.9 08/14/2016 10:50 AM    RBC 5.13 08/14/2016 10:50 AM    HEMOGLOBIN 15.7 08/14/2016 10:50 AM    HEMATOCRIT 45.6 08/14/2016 10:50 AM    MCV 88.9 08/14/2016 10:50 AM    MCH 30.5 08/14/2016 10:50 AM    MCHC 34.3 08/14/2016 10:50 AM    MPV 7.9 08/14/2016 10:50 AM    NEUTSPOLYS 54.6 12/14/2012 08:52 AM    LYMPHOCYTES 32.8 12/14/2012 08:52 AM    MONOCYTES 10.0 (H) 12/14/2012 08:52 AM    EOSINOPHILS 2.3 12/14/2012 08:52 AM    BASOPHILS 0.3 12/14/2012 08:52 AM        Lab Results   Component Value Date/Time    HBA1C 6.2 (H) 08/05/2022 10:03 AM        Imaging:   I personally reviewed following images, these are my reads  MRI lumbar spine 11/11/22  Disc bulge most notable at L2-3, L3-4, and L4-5. Severe left  and mild right neuroforaminal stenosis at L4-5. Mild right neuroforaminal stenosis at L5-S1. Moderate bilateral lateral recess narrowing narrowing and moderate right foraminal narrowing at L3-4. See formal radiology report for further details.    X-ray left knee 11/26/2022  Patella fermin.  Quadriceps tendon enthesophyte.  Mild osteoarthritis at medial compartment.     XR left knee 1/7/2023  Mild tricompartmental osteoarthritis.  Appears slightly worse at medial compartment.  Alignment intact. See formal radiology report for further details.    MRI cervical spine 5/11/2023  Mild central canal stenosis at C5-6.  Moderate central  canal stenosis at C4-5 with severe right and moderate left neuroforaminal stenosis at this level.  Moderate right neuroforaminal stenosis at C7-T1.  See formal radiology report for further details.    X-ray cervical spine 5/5/2023  Facet arthropathy of multiple levels. See formal radiology report for further details.    IMAGING radiology reads. I reviewed the following radiology reads     X-ray cervical spine 5/5/2023  FINDINGS:  The cervical vertebral bodies are normal in appearance and alignment is normal.  There is multilevel decreased disc height and endplate spurring. There is multilevel facet degeneration most prominently seen at C4-5, C5-6 and C6-7.. There is minimal anterior subluxation of C2-3 and C3-4 with mild retrolisthesis at C4-5.  There is left-sided carotid arterial atherosclerotic plaque. There is some dorsal soft tissue ossification.     IMPRESSION:     1.  Multilevel degenerative disc disease and facet degeneration.     2.  Mild multilevel degenerative subluxation.    MRI cervical spine 5/11/2023  FINDINGS:  Cervical spine alignment is within normal limits. Vertebral body heights are preserved. Bone marrow signal intensity is within normal limits.  The craniocervical junction is normal in appearance. The included portion of the posterior fossa is within normal limits.        Axial cervical spine levels:     C2-3: Endplate disc osteophyte complex mildly encroaches upon the spinal canal. There is no significant canal stenosis or foraminal narrowing.     C3-4: Endplate disc osteophyte complex with bilateral facet degenerative change slightly worse on the left side. There is no significant canal narrowing. There is no significant foraminal narrowing.     C4-5: Endplate disc osteophyte complex with bilateral uncovertebral degeneration and facet degeneration slightly worse on the right side. There is severe right and moderate left foraminal narrowing. There is also moderate canal narrowing,  asymmetrically   greater along the left paracentral aspect with slight cord deformity.     C5-C6: Endplate disc osteophyte complex with bilateral uncovertebral degenerative change. There is mild right foraminal narrowing and mild canal narrowing.     C6-7: Endplate disc osteophyte complex and bilateral uncovertebral degeneration, slightly greater on the right side. There is mild right foraminal narrowing without significant canal stenosis.     C7-T1: Endplate disc osteophyte complex with right-sided uncovertebral degeneration noted. There is moderate right foraminal narrowing.     The prevertebral soft tissues are within normal limits.        IMPRESSION:        Moderate canal stenosis at C4-5 with cord impingement. There is also severe right and moderate left foraminal narrowing at this level.     There is mild canal narrowing at C5-6.     Moderate right foraminal narrowing also noted at C7-T1.     No definite spinal cord signal abnormality is identified.    X-ray left knee 1/7/2023  FINDINGS:  Bone density is normal. There is no evidence of fracture or dislocation. There is tricompartment osteoarthritis characterized by osteophytic spurring. There is a small joint effusion.     IMPRESSION:     Mild tricompartment osteoarthritis.                         Results for orders placed in visit on 11/11/22     MR-LUMBAR SPINE-W/O     Impression  Mild lumbar spine degenerative changes. Type I marrow changes are noted to the adjacent L3-L4 endplates.     There is moderate right-sided foraminal narrowing at the L3-4 level with impingement upon the exiting right L3 nerve     Severe left foraminal narrowing at L4-5 with L4 nerve impingement.     Partially visualized is a mass involving the right renal upper pole. Recommend additional evaluation with a CT of the abdomen and pelvis with contrast, renal protocol.     These unexpected findings were communicated to the ordering provider by Epic inbasket message at 5:14 PM on  2022.               X-ray left knee 2022  FINDINGS:  No acute fracture or dislocation.     Mild osteoarthritis     Small knee joint effusion.     IMPRESSION:        1. No acute osseous abnormality.    Diagnosis  Visit Diagnoses     ICD-10-CM   1. Lumbar spondylosis  M47.816   2. Chronic pain of left knee  M25.562    G89.29   3. Primary osteoarthritis of left knee  M17.12   4. Cervicalgia  M54.2   5. Numbness and tingling of right arm  R20.0    R20.2   6. Numbness and tingling in left arm  R20.0    R20.2   7. Left lumbar radiculitis  M54.16   8. Chronic midline low back pain without sciatica  M54.50    G89.29           ASSESSMENT AND PLAN:  Andrei Galeas (: 1945) is a male with left low back and left knee and medial calf pain that appears to have component of left lumbar radiculitis from severe left neuroforaminal stenosis at L4-5, with ongoing resolution of radiating calf pain after epidural.  Now with significant left knee pain that appears to be mediated by osteoarthritis.    Also with exam today notable for tenderness to palpation at bilateral lower lumbar facet joints with exam notable for positive lumbar facet loading maneuver bilaterally reproducing the patient's pain, suggestive of lumbar facetogenic pain.  Imaging shows lumbar spondylosis at bilateral lower lumbar levels.    No longer having pain radiating down his arm.     Andrei was seen today for follow-up.    Diagnoses and all orders for this visit:    Lumbar spondylosis    Chronic pain of left knee    Primary osteoarthritis of left knee    Cervicalgia    Numbness and tingling of right arm    Numbness and tingling in left arm    Left lumbar radiculitis    Chronic midline low back pain without sciatica          PLAN  Physical Therapy:  I again discussed my recommendation of the patient pursue physical therapy to maximize the likelihood of long-term pain relief for both his knee and back.  I gave him a handout of physical  therapy exercises for low back pain and neck pain at a previous visit.  He declined formal physical therapy referral     Diagnostic workup:   no new imaging needed at this time    Medications:   - continue OTC tylenol, OTC ibuprofen. Discussed that  I would like him to follow-up with his PCP regarding elevated liver enzymes before possibly increasing Tylenol dose further  -Discussed that he may take OTC topical analgesics which previously improved his pain  -Discussed that he could consider continuing gabapentin per PCP discretion.  He does not feel that it is helping.    Interventions:   -Radiofrequency ablation of medial branches targeting Bilateral L4-L5 and L5-S1 facet joints PRN given 100% relief of index pain at his bilateral lumbar facet joints after this procedure.  - interlaminar left L5-S1 epidural steroid injection PRN if radiating pain returns  -s/p synvisc One injection for left knee under ultrasound guidance with improvement in left knee pain    Follow-up: 3 months    No orders of the defined types were placed in this encounter.      Claudia Cervantes MD  Interventional Pain and Spine  Physical Medicine and Rehabilitation  [facility]      The above note documents my personal evaluation of this patient. In addition, I have reviewed and confirmed with the patient and MA the supportive information documented in today's Patient Health Questionnaire and Office Note.     Please note that this dictation was created using voice recognition software. I have made every reasonable attempt to correct obvious errors, but I expect that there are errors of grammar and possibly content that I did not discover before finalizing the note.

## 2023-11-07 NOTE — PROGRESS NOTES
Follow-up patient Note    Interventional Pain and Spine  Physiatry (Physical Medicine and Rehabilitation)     Patient Name: Andrei Galeas  : 1945  Date of service: 2023    Chief Complaint:   Chief Complaint   Patient presents with    Follow-Up     Fv       HISTORY  Please see new patient note by Dr. Cervantes for more details.     HPI  Today's visit   Andrei Galeas ( 1945) is a RHD male with The primary encounter diagnosis was Left lumbar radiculitis. Diagnoses of Numbness and tingling in left arm and Lumbar spondylosis were also pertinent to this visit.    Ongoing bilateral low back pain worst in the glutes.  Better with leaning forward over a shopping cart.  Worse with standing and walking. Endurance has worsened. Has to sit for a few minutes before he feels ready to walk again.  Symptoms worsened since his last MRI lumbar spine in .    No longer having radiating pain from the neck down the arm.     Alternates taking tylenol or advil PRN for pain QHS with improvement in sleep. Has taken gabapentin 100-200mg QHS with no noticeable relief and no known unwanted SE.     Procedure history:  - 22 Lumbar Epidural Steroid Injection at the left L5-S1 level - 100% pain relief x 1 month, then started wearing off 1 week ago, now 20-30% better.  Denies radiating pain in legs at follow-up on 3/17/2023.  - 23 left knee steroid injection - 3 days of significant relief  - 3/24/23 left knee steroid injection - 3 days of significant relief  - 23 Left knee Synvisc One injection - 82% pain relief  - 2023 diagnostic medial branch blocks targeting the BILATERAL L4-5 and L5-S1 facet joints with fluoroscopic guidance #1 - 80% improvement in pain, improvement in ability to do ADLs like standing, lying down, turning, transitioning between sit and stand.   - 23 diagnostic lumbar medial branch blocks targeting the bilateral L4-L5 and L5-S1 facet joints #2 - 80% improvement in  pain and improvement in sitting for a prolonged time, standing, lying down  - 7/6/23 Radiofrequency ablation of medial branches targeting Bilateral L4-L5 and L5-S1 facet joints - 100% relief of index pain at his bilateral lumbar facet joints.      ROS:   Red Flags ROS:   Fever, Chills, Sweats: Denies  Involuntary Weight Loss: Denies  Bladder Incontinence: Denies  Bowel Incontinence: denies  Saddle Anesthesia: Denies    All other systems reviewed and negative.     PMHx:   Past Medical History:   Diagnosis Date    Arthritis     Back pain     Cataract     Depression     Diabetes (HCC)     borderline    Hypertension     Obstructive chronic bronchitis without exacerbation 8/5/2022    Shortness of breath     Sinusitis     Sleep apnea     Wheezing        PSHx:   Past Surgical History:   Procedure Laterality Date    CA DSTR NROLYTC AGNT PARVERTEB FCT SNGL LMBR/SACRAL Bilateral 7/6/2023    Procedure: RIGHT and LEFT radiofrequency neurotomies medial branch targeting the L4-5 and L5-S1 facet joints with fluoroscopic guidance and sedation, Plan for 80 °C for 90 seconds for each neurotomy;  Surgeon: Claudia Cervantes M.D.;  [facility];  Service: Pain Management    CA INJ DX/THER AGNT PARAVERT FACET JOINT, CELESTE* Bilateral 6/1/2023    Procedure: Diagnostic medial branch blocks targeting the BILATERAL L4-5 and L5-S1 facet joints with fluoroscopic guidance #2;  Surgeon: Claudia Cervantes M.D.;  [facility];  Service: Pain Management    INJ,ANES LUMBAR/CERVICAL Bilateral 5/16/2023    Procedure: Diagnostic medial branch blocks targeting the BILATERAL L4-5 and L5-S1 facet joints with fluoroscopic guidance #1;  Surgeon: Claudia Cervantes M.D.;  [facility];  Service: Pain Management    CA INJ LUMBAR/SACRAL,W/ IMAGING Left 12/20/2022    Procedure: LEFT lumbar L5-S1 interlaminar epidural steroid injection.;  Surgeon: Claudia Cervantes M.D.;  [facility];  Service: Pain Management    SINUSOTOMIES         Family Hx:   Family History   Problem Relation Age of Onset    Heart Disease Paternal Uncle         MI    Arthritis Mother     Psychiatric Illness Father         stomach cancer    Hypertension Father     Hyperlipidemia Father     Stroke Father        Social Hx:  Social History     Socioeconomic History    Marital status:      Spouse name: Not on file    Number of children: Not on file    Years of education: Not on file    Highest education level: Not on file   Occupational History    Not on file   Tobacco Use    Smoking status: Former     Current packs/day: 0.00     Average packs/day: 2.0 packs/day for 10.0 years (20.0 ttl pk-yrs)     Types: Cigarettes     Start date: 1967     Quit date: 1977     Years since quittin.1    Smokeless tobacco: Never   Vaping Use    Vaping Use: Never used   Substance and Sexual Activity    Alcohol use: Yes     Comment: 1-2 drinks every week or two    Drug use: No    Sexual activity: Not on file   Other Topics Concern    Not on file   Social History Narrative    Not on file     Social Determinants of Health     Financial Resource Strain: Not on file   Food Insecurity: No Food Insecurity (2022)    Hunger Vital Sign     Worried About Running Out of Food in the Last Year: Never true     Ran Out of Food in the Last Year: Never true   Transportation Needs: No Transportation Needs (2022)    PRAPARE - Transportation     Lack of Transportation (Medical): No     Lack of Transportation (Non-Medical): No   Physical Activity: Inactive (2022)    Exercise Vital Sign     Days of Exercise per Week: 0 days     Minutes of Exercise per Session: 0 min   Stress: Stress Concern Present (2022)    [facility] - Occupational Stress Questionnaire     Feeling of Stress : To some extent   Social Connections: Socially Integrated (2022)    Social Connection and Isolation Panel  [NHANES]     Frequency of Communication with Friends and Family: Three times a week     Frequency of Social Gatherings with Friends and Family: Once a week     Attends Buddhism Services: More than 4 times per year     Active Member of Clubs or Organizations: Yes     Attends Club or Organization Meetings: More than 4 times per year     Marital Status:    Intimate Partner Violence: Not on file   Housing Stability: Low Risk  (8/4/2022)    Housing Stability Vital Sign     Unable to Pay for Housing in the Last Year: No     Number of Places Lived in the Last Year: 1     Unstable Housing in the Last Year: No       Allergies:  Allergies   Allergen Reactions    Seasonal     Penicillins Unspecified     Childhood - unknown reaction       Medications: reviewed on epic.   Outpatient Medications Marked as Taking for the 11/8/23 encounter (Office Visit) with Claudia Cervantes M.D.   Medication Sig Dispense Refill    losartan-hydrochlorothiazide (HYZAAR) 100-25 MG per tablet Take 1 Tablet by mouth every day. 100 Tablet 1    tamsulosin (FLOMAX) 0.4 MG capsule TAKE 2 CAPSULES BY MOUTH ONCE DAILY AS DIRECTED AFTER A MEAL IN THE EVENING 180 Capsule 1    finasteride (PROSCAR) 5 MG Tab Take 1 tablet by mouth once daily 90 Tablet 1    diphenhydrAMINE-APAP, sleep, (TYLENOL PM EXTRA STRENGTH)  MG Tab Take 1 Tablet by mouth at bedtime as needed.      gabapentin (NEURONTIN) 100 MG Cap Take 1-2 tabs po at bedtime as needed for pain 60 Capsule 1    ketoconazole (NIZORAL) 2 % Cream ketoconazole 2 % topical cream   APPLY CREAM TOPICALLY TO AFFECTED AREA ON THE SKIN TWICE DAILY      fexofenadine (ALLEGRA) 60 MG Tab Take 60 mg by mouth every day.      multivitamin (THERAGRAN) Tab Take 1 Tab by mouth every day.      fluticasone (FLONASE) 50 MCG/ACT nasal spray Spray 1 Spray in nose every day.          Current Outpatient Medications on File Prior to Visit   Medication Sig Dispense Refill    losartan-hydrochlorothiazide (HYZAAR) 100-25 MG  "per tablet Take 1 Tablet by mouth every day. 100 Tablet 1    tamsulosin (FLOMAX) 0.4 MG capsule TAKE 2 CAPSULES BY MOUTH ONCE DAILY AS DIRECTED AFTER A MEAL IN THE EVENING 180 Capsule 1    finasteride (PROSCAR) 5 MG Tab Take 1 tablet by mouth once daily 90 Tablet 1    diphenhydrAMINE-APAP, sleep, (TYLENOL PM EXTRA STRENGTH)  MG Tab Take 1 Tablet by mouth at bedtime as needed.      gabapentin (NEURONTIN) 100 MG Cap Take 1-2 tabs po at bedtime as needed for pain 60 Capsule 1    ketoconazole (NIZORAL) 2 % Cream ketoconazole 2 % topical cream   APPLY CREAM TOPICALLY TO AFFECTED AREA ON THE SKIN TWICE DAILY      fexofenadine (ALLEGRA) 60 MG Tab Take 60 mg by mouth every day.      multivitamin (THERAGRAN) Tab Take 1 Tab by mouth every day.      fluticasone (FLONASE) 50 MCG/ACT nasal spray Spray 1 Spray in nose every day.       No current facility-administered medications on file prior to visit.         EXAMINATION     Physical Exam:   BP (!) 140/68 (BP Location: Left arm, Patient Position: Sitting, BP Cuff Size: Adult)   Pulse 74   Temp 35.8 °C (96.5 °F) (Temporal)   Ht 1.803 m (5' 11\")   Wt (!) 132 kg (290 lb 9.1 oz)   SpO2 95%     Constitutional:   Body Habitus: Body mass index is 40.53 kg/m².  Cooperation: Fully cooperates with exam  Appearance: Well-groomed, well-nourished.    Eyes: No scleral icterus to suggest severe liver disease, no proptosis to suggest severe hyperthyroidism    ENT -no obvious auditory deficits, no noticeable facial droop     Skin -no rashes or lesions noted     Respiratory-  breathing comfortably on room air, no audible wheezing    Cardiovascular-distal extremities warm and well perfused.  No lower extremity edema is noted.     Gastrointestinal - no obvious abdominal masses, non-distended    Psychiatric- alert and oriented ×3. Normal affect.     Gait - normal gait, no use of ambulatory device, nonantalgic.     Musculoskeletal and Neuro -       Thoracic/Lumbar Spine/Sacral Spine/Hips "     Facet loading maneuver negative bilaterally     Palpation:   Tenderness to palpation over the bilateral glutes.  No tenderness to palpation elsewhere in the low back/hips including midline lumbar spine, lumbar facets bilaterally, paraspinal muscles bilaterally, sacroiliac joints bilaterally, PSIS bilaterally, and greater trochanters bilaterally.    Lumbar spine /hip provocative exam maneuvers  Straight leg raise negative bilaterally  FADIR test negative bilaterally      SI joint tests  ASYA test negative bilaterally  Thigh thrust test negative bilaterally    Inspection: No evidence of atrophy in bilateral lower extremities throughout     Key points for the international standards for neurological classification of spinal cord injury (ISNCSCI) to light touch.   Dermatome R L   L2 2 2   L3 2 2   L4 2 2   L5 2 2   S1 2 2   S2 2 2         Motor Exam Lower Extremities  ? Myotome R L   Hip flexion L2 5 5   Knee extension L3 5 5   Ankle dorsiflexion L4 5 5   Toe extension L5 5 5   Ankle plantarflexion S1 5 5        Previous exam  Focal tenderness to palpation at left knee medial joint line, just inferior to left knee medial joint line, and supra patellar facet.      Full active range of motion with knee flexion and extension.  No tenderness to palpation elsewhere in left knee.          Cervical spine   Inspection: No deformities of the skin over the cervical spine. No rashes or lesions.    limited active range of motion in all directions    Spurling's sign  negative bilaterally  Cervical facet loading maneuver  negative bilaterally    No signs of muscular atrophy in bilateral upper extremities     Tenderness to palpation at paracervical muscles bilaterally, cervical facets bilaterally, and upper trapezius bilaterally. No tenderness to palpation elsewhere including midline of cervical spine.      Key points for the international standards for neurological classification of spinal cord injury (ISNCSCI) to light touch.  "    Dermatome R L   C4 2 2   C5 2 2   C6 1 1   C7 1 1   C8 1 1   T1 2 2   T2 2 2       Motor Exam Upper Extremities   ? Myotome R L   Shoulder abduction C5 5 5   Elbow flexion C5 5 5   Wrist extension C6 5 5   Elbow extension C7 5 5   Finger flexion C8 5 5   Finger abduction T1 5 5     Reflexes  ?  R L   Biceps  1+ 1+   Brachioradialis  1+ 1+     March's sign negative bilaterally     Bilateral hands:   Inspection: No swelling, deformities, or rashes. Symmetric appearing thenar and hyperthenar regions bilaterally.  Palpation: no significant tenderness to palpation throughout the bilateral hands  Range of motion is within normal limits throughout bilateral hands, fingers and wrist.    Special tests:  Tinel's at the wrist over the median nerve positive on left, negative on right  Carpal tunnel compression: negative bilaterally  Phalen's test: positive on left, negative on right  Tinel's at the cubital tunnel: negative bilaterally      MEDICAL DECISION MAKING    Medical records review: see under HPI section.     DATA    Labs: No new labs available for review since last visit.   Lab Results   Component Value Date/Time    SODIUM 136 12/08/2022 10:57 AM    POTASSIUM 4.0 12/08/2022 10:57 AM    CHLORIDE 98 12/08/2022 10:57 AM    CO2 24 12/08/2022 10:57 AM    ANION 14.0 12/08/2022 10:57 AM    GLUCOSE 120 (H) 12/08/2022 10:57 AM    BUN 16 12/08/2022 10:57 AM    CREATININE 0.87 12/08/2022 10:57 AM    CALCIUM 10.0 12/08/2022 10:57 AM    ASTSGOT 56 (H) 12/08/2022 10:57 AM    ALTSGPT 80 (H) 12/08/2022 10:57 AM    TBILIRUBIN 0.6 12/08/2022 10:57 AM    ALBUMIN 4.6 12/08/2022 10:57 AM    TOTPROTEIN 7.3 12/08/2022 10:57 AM    GLOBULIN 2.7 12/08/2022 10:57 AM    AGRATIO 1.7 12/08/2022 10:57 AM       No results found for: \"PROTHROMBTM\", \"INR\"     Lab Results   Component Value Date/Time    WBC 8.9 08/14/2016 10:50 AM    RBC 5.13 08/14/2016 10:50 AM    HEMOGLOBIN 15.7 08/14/2016 10:50 AM    HEMATOCRIT 45.6 08/14/2016 10:50 AM    MCV " 88.9 08/14/2016 10:50 AM    MCH 30.5 08/14/2016 10:50 AM    MCHC 34.3 08/14/2016 10:50 AM    MPV 7.9 08/14/2016 10:50 AM    NEUTSPOLYS 54.6 12/14/2012 08:52 AM    LYMPHOCYTES 32.8 12/14/2012 08:52 AM    MONOCYTES 10.0 (H) 12/14/2012 08:52 AM    EOSINOPHILS 2.3 12/14/2012 08:52 AM    BASOPHILS 0.3 12/14/2012 08:52 AM        Lab Results   Component Value Date/Time    HBA1C 6.2 (H) 08/05/2022 10:03 AM        Imaging:   I personally reviewed following images, these are my reads  MRI lumbar spine 11/11/22  Disc bulge most notable at L2-3, L3-4, and L4-5. Severe left  and mild right neuroforaminal stenosis at L4-5. Mild right neuroforaminal stenosis at L5-S1. Moderate bilateral lateral recess narrowing narrowing and moderate right foraminal narrowing at L3-4. See formal radiology report for further details.    X-ray left knee 11/26/2022  Patella fermin.  Quadriceps tendon enthesophyte.  Mild osteoarthritis at medial compartment.     XR left knee 1/7/2023  Mild tricompartmental osteoarthritis.  Appears slightly worse at medial compartment.  Alignment intact. See formal radiology report for further details.    MRI cervical spine 5/11/2023  Mild central canal stenosis at C5-6.  Moderate central canal stenosis at C4-5 with severe right and moderate left neuroforaminal stenosis at this level.  Moderate right neuroforaminal stenosis at C7-T1.  See formal radiology report for further details.    X-ray cervical spine 5/5/2023  Facet arthropathy of multiple levels. See formal radiology report for further details.    IMAGING radiology reads. I reviewed the following radiology reads     X-ray cervical spine 5/5/2023  FINDINGS:  The cervical vertebral bodies are normal in appearance and alignment is normal.  There is multilevel decreased disc height and endplate spurring. There is multilevel facet degeneration most prominently seen at C4-5, C5-6 and C6-7.. There is minimal anterior subluxation of C2-3 and C3-4 with mild retrolisthesis  at C4-5.  There is left-sided carotid arterial atherosclerotic plaque. There is some dorsal soft tissue ossification.     IMPRESSION:     1.  Multilevel degenerative disc disease and facet degeneration.     2.  Mild multilevel degenerative subluxation.    MRI cervical spine 5/11/2023  FINDINGS:  Cervical spine alignment is within normal limits. Vertebral body heights are preserved. Bone marrow signal intensity is within normal limits.  The craniocervical junction is normal in appearance. The included portion of the posterior fossa is within normal limits.        Axial cervical spine levels:     C2-3: Endplate disc osteophyte complex mildly encroaches upon the spinal canal. There is no significant canal stenosis or foraminal narrowing.     C3-4: Endplate disc osteophyte complex with bilateral facet degenerative change slightly worse on the left side. There is no significant canal narrowing. There is no significant foraminal narrowing.     C4-5: Endplate disc osteophyte complex with bilateral uncovertebral degeneration and facet degeneration slightly worse on the right side. There is severe right and moderate left foraminal narrowing. There is also moderate canal narrowing, asymmetrically   greater along the left paracentral aspect with slight cord deformity.     C5-C6: Endplate disc osteophyte complex with bilateral uncovertebral degenerative change. There is mild right foraminal narrowing and mild canal narrowing.     C6-7: Endplate disc osteophyte complex and bilateral uncovertebral degeneration, slightly greater on the right side. There is mild right foraminal narrowing without significant canal stenosis.     C7-T1: Endplate disc osteophyte complex with right-sided uncovertebral degeneration noted. There is moderate right foraminal narrowing.     The prevertebral soft tissues are within normal limits.        IMPRESSION:        Moderate canal stenosis at C4-5 with cord impingement. There is also severe right and  moderate left foraminal narrowing at this level.     There is mild canal narrowing at C5-6.     Moderate right foraminal narrowing also noted at C7-T1.     No definite spinal cord signal abnormality is identified.    X-ray left knee 2023  FINDINGS:  Bone density is normal. There is no evidence of fracture or dislocation. There is tricompartment osteoarthritis characterized by osteophytic spurring. There is a small joint effusion.     IMPRESSION:     Mild tricompartment osteoarthritis.                         Results for orders placed in visit on 22     MR-LUMBAR SPINE-W/O     Impression  Mild lumbar spine degenerative changes. Type I marrow changes are noted to the adjacent L3-L4 endplates.     There is moderate right-sided foraminal narrowing at the L3-4 level with impingement upon the exiting right L3 nerve     Severe left foraminal narrowing at L4-5 with L4 nerve impingement.     Partially visualized is a mass involving the right renal upper pole. Recommend additional evaluation with a CT of the abdomen and pelvis with contrast, renal protocol.     These unexpected findings were communicated to the ordering provider by Epic inbasket message at 5:14 PM on 2022.               X-ray left knee 2022  FINDINGS:  No acute fracture or dislocation.     Mild osteoarthritis     Small knee joint effusion.     IMPRESSION:        1. No acute osseous abnormality.    Diagnosis  Visit Diagnoses     ICD-10-CM   1. Left lumbar radiculitis  M54.16   2. Numbness and tingling in left arm  R20.0    R20.2   3. Lumbar spondylosis  M47.816             ASSESSMENT AND PLAN:  Andrei Galeas (: 1945) is a male with left low back and left knee and medial calf pain that appears to have component of left lumbar radiculitis from severe left neuroforaminal stenosis at L4-5, with ongoing resolution of radiating calf pain after epidural.  Now with significant left knee pain that appears to be mediated by  osteoarthritis.    No longer having pain radiating down his arm.     Andrei was seen today for follow-up.    Diagnoses and all orders for this visit:    Left lumbar radiculitis  -     MR-LUMBAR SPINE-W/O; Future    Numbness and tingling in left arm    Lumbar spondylosis  -     MR-LUMBAR SPINE-W/O; Future            PLAN  Physical Therapy:  I have discussed my recommendation of the patient pursue physical therapy to maximize the likelihood of long-term pain relief for both his knee and back.  I gave him a handout of physical therapy exercises for low back pain and neck pain at a previous visit.  He declined formal physical therapy referral     Diagnostic workup:   MRI lumbar spine.  Concern for worsening lumbar spinal stenosis    Medications:   - continue OTC tylenol, OTC ibuprofen. Discussed that  I would like him to follow-up with his PCP regarding elevated liver enzymes before possibly increasing Tylenol dose further  -Discussed that he may take OTC topical analgesics which previously improved his pain  -Discussed that he could consider continuing gabapentin per PCP discretion.  He does not feel that it is helping.    Interventions:   -Radiofrequency ablation of medial branches targeting Bilateral L4-L5 and L5-S1 facet joints PRN given 100% relief of index pain at his bilateral lumbar facet joints after this procedure.  - interlaminar left L5-S1 epidural steroid injection PRN if radiating pain returns  -s/p synvisc One injection for left knee under ultrasound guidance with improvement in left knee pain    Follow-up: After MRI    Orders Placed This Encounter    MR-LUMBAR SPINE-W/O       Claudia Cervantes MD  Interventional Pain and Spine  Physical Medicine and Rehabilitation  [facility]      The above note documents my personal evaluation of this patient. In addition, I have reviewed and confirmed with the patient and MA the supportive information documented in today's Patient Health Questionnaire and Office  Note.     Please note that this dictation was created using voice recognition software. I have made every reasonable attempt to correct obvious errors, but I expect that there are errors of grammar and possibly content that I did not discover before finalizing the note.

## 2023-11-08 ENCOUNTER — OFFICE VISIT (OUTPATIENT)
Dept: PHYSICAL MEDICINE AND REHAB | Facility: MEDICAL CENTER | Age: 78
End: 2023-11-08
Payer: MEDICARE

## 2023-11-08 VITALS
HEIGHT: 71 IN | BODY MASS INDEX: 40.68 KG/M2 | DIASTOLIC BLOOD PRESSURE: 68 MMHG | OXYGEN SATURATION: 95 % | SYSTOLIC BLOOD PRESSURE: 140 MMHG | TEMPERATURE: 96.5 F | HEART RATE: 74 BPM | WEIGHT: 290.57 LBS

## 2023-11-08 DIAGNOSIS — M47.816 LUMBAR SPONDYLOSIS: ICD-10-CM

## 2023-11-08 DIAGNOSIS — R20.0 NUMBNESS AND TINGLING IN LEFT ARM: ICD-10-CM

## 2023-11-08 DIAGNOSIS — M54.16 LEFT LUMBAR RADICULITIS: Primary | ICD-10-CM

## 2023-11-08 DIAGNOSIS — R20.2 NUMBNESS AND TINGLING IN LEFT ARM: ICD-10-CM

## 2023-11-08 PROCEDURE — 3077F SYST BP >= 140 MM HG: CPT | Performed by: STUDENT IN AN ORGANIZED HEALTH CARE EDUCATION/TRAINING PROGRAM

## 2023-11-08 PROCEDURE — 3078F DIAST BP <80 MM HG: CPT | Performed by: STUDENT IN AN ORGANIZED HEALTH CARE EDUCATION/TRAINING PROGRAM

## 2023-11-08 PROCEDURE — 1125F AMNT PAIN NOTED PAIN PRSNT: CPT | Performed by: STUDENT IN AN ORGANIZED HEALTH CARE EDUCATION/TRAINING PROGRAM

## 2023-11-08 PROCEDURE — 99213 OFFICE O/P EST LOW 20 MIN: CPT | Performed by: STUDENT IN AN ORGANIZED HEALTH CARE EDUCATION/TRAINING PROGRAM

## 2023-11-08 ASSESSMENT — PATIENT HEALTH QUESTIONNAIRE - PHQ9: CLINICAL INTERPRETATION OF PHQ2 SCORE: 1

## 2023-11-08 ASSESSMENT — PAIN SCALES - GENERAL: PAINLEVEL: 7=MODERATE-SEVERE PAIN

## 2023-11-10 RX ORDER — ROSUVASTATIN CALCIUM 10 MG/1
10 TABLET, COATED ORAL DAILY
Qty: 100 TABLET | Refills: 0 | Status: SHIPPED | OUTPATIENT
Start: 2023-11-10 | End: 2024-01-04 | Stop reason: SDUPTHER

## 2023-12-01 ENCOUNTER — APPOINTMENT (OUTPATIENT)
Dept: RADIOLOGY | Facility: MEDICAL CENTER | Age: 78
End: 2023-12-01
Attending: STUDENT IN AN ORGANIZED HEALTH CARE EDUCATION/TRAINING PROGRAM
Payer: MEDICARE

## 2023-12-01 DIAGNOSIS — M47.816 LUMBAR SPONDYLOSIS: ICD-10-CM

## 2023-12-01 DIAGNOSIS — M54.16 LEFT LUMBAR RADICULITIS: ICD-10-CM

## 2023-12-06 ENCOUNTER — APPOINTMENT (OUTPATIENT)
Dept: PHYSICAL MEDICINE AND REHAB | Facility: MEDICAL CENTER | Age: 78
End: 2023-12-06
Payer: MEDICARE

## 2023-12-26 RX ORDER — TAMSULOSIN HYDROCHLORIDE 0.4 MG/1
CAPSULE ORAL
Qty: 60 CAPSULE | Refills: 0 | Status: SHIPPED | OUTPATIENT
Start: 2023-12-26 | End: 2024-01-04 | Stop reason: SDUPTHER

## 2023-12-26 NOTE — TELEPHONE ENCOUNTER
Received request via: Pharmacy    Was the patient seen in the last year in this department? No    Does the patient have an active prescription (recently filled or refills available) for medication(s) requested? No    Does the patient have [facility] and need 100 day supply (blood pressure, diabetes and cholesterol meds only)? Pt needs an appt

## 2024-01-02 ENCOUNTER — APPOINTMENT (OUTPATIENT)
Dept: RADIOLOGY | Facility: MEDICAL CENTER | Age: 79
End: 2024-01-02
Attending: EMERGENCY MEDICINE
Payer: MEDICARE

## 2024-01-02 ENCOUNTER — APPOINTMENT (OUTPATIENT)
Dept: URGENT CARE | Facility: CLINIC | Age: 79
End: 2024-01-02
Payer: MEDICARE

## 2024-01-02 ENCOUNTER — APPOINTMENT (OUTPATIENT)
Dept: RADIOLOGY | Facility: MEDICAL CENTER | Age: 79
End: 2024-01-02
Payer: MEDICARE

## 2024-01-02 ENCOUNTER — HOSPITAL ENCOUNTER (EMERGENCY)
Facility: MEDICAL CENTER | Age: 79
End: 2024-01-02
Attending: EMERGENCY MEDICINE
Payer: MEDICARE

## 2024-01-02 VITALS
OXYGEN SATURATION: 95 % | TEMPERATURE: 97.7 F | BODY MASS INDEX: 42.01 KG/M2 | HEIGHT: 70 IN | RESPIRATION RATE: 20 BRPM | SYSTOLIC BLOOD PRESSURE: 188 MMHG | DIASTOLIC BLOOD PRESSURE: 86 MMHG | WEIGHT: 293.43 LBS | HEART RATE: 66 BPM

## 2024-01-02 DIAGNOSIS — S89.91XA INJURY OF RIGHT KNEE, INITIAL ENCOUNTER: ICD-10-CM

## 2024-01-02 DIAGNOSIS — S99.911A INJURY OF RIGHT ANKLE, INITIAL ENCOUNTER: ICD-10-CM
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
## 2024-01-02 DIAGNOSIS — S00.81XA ABRASION OF FACE, INITIAL ENCOUNTER: ICD-10-CM

## 2024-01-02 DIAGNOSIS — S69.91XA INJURY OF RIGHT WRIST, INITIAL ENCOUNTER: ICD-10-CM

## 2024-01-02 DIAGNOSIS — S09.90XA CLOSED HEAD INJURY, INITIAL ENCOUNTER: ICD-10-CM

## 2024-01-02 PROCEDURE — 90715 TDAP VACCINE 7 YRS/> IM: CPT | Performed by: EMERGENCY MEDICINE

## 2024-01-02 PROCEDURE — 72125 CT NECK SPINE W/O DYE: CPT

## 2024-01-02 PROCEDURE — 99284 EMERGENCY DEPT VISIT MOD MDM: CPT

## 2024-01-02 PROCEDURE — 73552 X-RAY EXAM OF FEMUR 2/>: CPT | Mod: RT

## 2024-01-02 PROCEDURE — 700111 HCHG RX REV CODE 636 W/ 250 OVERRIDE (IP): Performed by: EMERGENCY MEDICINE

## 2024-01-02 PROCEDURE — 70450 CT HEAD/BRAIN W/O DYE: CPT

## 2024-01-02 PROCEDURE — 73610 X-RAY EXAM OF ANKLE: CPT | Mod: RT

## 2024-01-02 PROCEDURE — 90471 IMMUNIZATION ADMIN: CPT

## 2024-01-02 PROCEDURE — 73110 X-RAY EXAM OF WRIST: CPT | Mod: RT

## 2024-01-02 PROCEDURE — 73562 X-RAY EXAM OF KNEE 3: CPT | Mod: RT

## 2024-01-02 RX ADMIN — CLOSTRIDIUM TETANI TOXOID ANTIGEN (FORMALDEHYDE INACTIVATED), CORYNEBACTERIUM DIPHTHERIAE TOXOID ANTIGEN (FORMALDEHYDE INACTIVATED), BORDETELLA PERTUSSIS TOXOID ANTIGEN (GLUTARALDEHYDE INACTIVATED), BORDETELLA PERTUSSIS FILAMENTOUS HEMAGGLUTININ ANTIGEN (FORMALDEHYDE INACTIVATED), BORDETELLA PERTUSSIS PERTACTIN ANTIGEN, AND BORDETELLA PERTUSSIS FIMBRIAE 2/3 ANTIGEN 0.5 ML: 5; 2; 2.5; 5; 3; 5 INJECTION, SUSPENSION INTRAMUSCULAR at 18:34

## 2024-01-03 NOTE — ED TRIAGE NOTES
Patient presents with complaints of head pain after incurring a mechanical GLF at 1000 this morning. States he was carrying boxes and tripped over a cement piece in a parking lot. Denies taking blood thinning medication. Denies LOC or neck pain.

## 2024-01-03 NOTE — ED NOTES
Assisted w/ discharge.  Tetanus given as ordered.  Reviewed discharge instructions w/ pt and visitor, verbalized understanding to information provided including follow up care, return precautions, fall precautions and sprain care, denied questions/concerns.  Pt ambulated from ED w/ visitor.

## 2024-01-03 NOTE — ED PROVIDER NOTES
ED Provider Note    CHIEF COMPLAINT  Chief Complaint   Patient presents with    T-5000 FALL     Mechanical GLF at 1000 today        [facility]    Primary care provider: Leo Villegas M.D.   History obtained from: Patient and wife  History limited by: None     Andrei Galeas is a 78 y.o. male who presents to the ED with wife to the ED complaining of injuries due to a fall around 10:00 this morning.  Patient states that he was carrying some boxes and did not see a curb on the ground and tripped over the curb causing him to fall.  He has abrasions to his forehead and nose and complaining of some headache and also complaining of right wrist pain, pain behind his right thigh, right knee pain and right ankle pain.  He denies loss of consciousness.  He denies nausea or vomiting.  Denies any weakness or sensory change.  He is not on blood thinners.  He does not recall his last tetanus shot.    REVIEW OF SYSTEMS  Please see HPI for pertinent positives/negatives.  All other systems reviewed and are negative.     PAST MEDICAL HISTORY  Past Medical History:   Diagnosis Date    Obstructive chronic bronchitis without exacerbation 8/5/2022    Arthritis     Back pain     Cataract     Depression     Diabetes (HCC)     borderline    Hypertension     Shortness of breath     Sinusitis     Sleep apnea     Wheezing         SURGICAL HISTORY  Past Surgical History:   Procedure Laterality Date    [place] DSTR NROLYTC AGNT PARVERTEB FCT SNGL LMBR/SACRAL Bilateral 7/6/2023    Procedure: RIGHT and LEFT radiofrequency neurotomies medial branch targeting the L4-5 and L5-S1 facet joints with fluoroscopic guidance and sedation, Plan for 80 °C for 90 seconds for each neurotomy;  Surgeon: Claudia Cervantes M.D.;  [facility];  Service: Pain Management    [place] INJ DX/THER AGNT PARAVERT FACET JOINT, CELESTE* Bilateral 6/1/2023    Procedure: Diagnostic medial branch blocks  targeting the BILATERAL L4-5 and L5-S1 facet joints with fluoroscopic guidance #2;  Surgeon: Claudia Cervantes M.D.;  Location: [facility];  Service: Pain Management    INJ,ANES LUMBAR/CERVICAL Bilateral 2023    Procedure: Diagnostic medial branch blocks targeting the BILATERAL L4-5 and L5-S1 facet joints with fluoroscopic guidance #1;  Surgeon: Claudia Cervantes M.D.;  Location: [facility];  Service: Pain Management    [place] INJ LUMBAR/SACRAL,W/ IMAGING Left 2022    Procedure: LEFT lumbar L5-S1 interlaminar epidural steroid injection.;  Surgeon: Claudia Cervantes M.D.;  Location: [facility];  Service: Pain Management    SINUSOTOMIES          SOCIAL HISTORY  Social History     Tobacco Use    Smoking status: Former     Current packs/day: 0.00     Average packs/day: 2.0 packs/day for 10.0 years (20.0 ttl pk-yrs)     Types: Cigarettes     Start date: 1967     Quit date: 1977     Years since quittin.3    Smokeless tobacco: Never   Vaping Use    Vaping Use: Never used   Substance and Sexual Activity    Alcohol use: Yes     Comment: 1-2 drinks every week or two    Drug use: No    Sexual activity: Not on file        FAMILY HISTORY  Family History   Problem Relation Age of Onset    Heart Disease Paternal Uncle         MI    Arthritis Mother     Psychiatric Illness Father         stomach cancer    Hypertension Father     Hyperlipidemia Father     Stroke Father         CURRENT MEDICATIONS  Home Medications       Reviewed by Franca Marshall R.N. (Registered Nurse) on 24 at 1640  Med List Status: Not Addressed     Medication Last Dose Status   diphenhydrAMINE-APAP, sleep, (TYLENOL PM EXTRA STRENGTH)  MG Tab  Active   fexofenadine (ALLEGRA) 60 MG Tab  Active   finasteride (PROSCAR) 5 MG Tab  Active   fluticasone (FLONASE) 50 MCG/ACT nasal spray  Active   gabapentin (NEURONTIN) 100 MG Cap  Active   ketoconazole (NIZORAL) 2 % Cream  Active  "  losartan-hydrochlorothiazide (HYZAAR) 100-25 MG per tablet  Active   multivitamin (THERAGRAN) Tab  Active   rosuvastatin (CRESTOR) 10 MG Tab  Active   tamsulosin (FLOMAX) 0.4 MG capsule  Active                     ALLERGIES  Allergies   Allergen Reactions    Seasonal     Penicillins Unspecified     Childhood - unknown reaction        PHYSICAL EXAM  VITAL SIGNS: BP (!) 188/86   Pulse 66   Temp 36.5 °C (97.7 °F) (Temporal)   Resp 20   Ht 1.778 m (5' 10\")   Wt (!) 133 kg (293 lb 6.9 oz)   SpO2 95%   BMI 42.10 kg/m²  @CHRYSTAL[[id number]::@     Pulse ox interpretation: 95% I interpret this pulse ox as normal     Constitutional: Well developed, well nourished, alert in no apparent distress, nontoxic appearance    HENT: No external signs of trauma except for abrasion to right forehead and to his nose with mild tenderness to palpation without crepitus/step-off, normocephalic, bilateral external ears normal, no hemotympanum bilaterally, oropharynx moist and clear, airway patent, nose with no hematoma/bleeding/drainage, midface stable, no malocclusion, no periorbital swelling/bruising, no mastoid swelling/bruising    Eyes: PERRL, EOMI without apparent restrictions or discomfort, conjunctiva without erythema, no discharge, no icterus    Neck: Soft and supple, trachea midline, no stridor, no swelling/bruising, no midline C-spine tenderness, no stepoffs, good ROM without discomfort  Cardiovascular: Regular rate and rhythm, no murmurs/rubs/gallops, strong distal pulses and good perfusion    Thorax & Lungs: No respiratory distress, CTAB with equal BS bilaterally, no chest tenderness  Abdomen: Soft, nontender, nondistended, no G/R, normal BS, pelvis stable    Back: Nontender to palpation  Extremities: No cyanosis, mild bilateral lower extremity edema, mild tenderness to right wrist medially, mild tenderness to right distal thigh posteriorly, mild tenderness to right knee anteriorly and mild diffuse tenderness to right ankle " diffusely, no gross deformity, good ROM at all joints, intact distal pulses with brisk cap refill    Skin: Warm, dry, no pallor/cyanosis, no rash noted except chronic appearing changes  Neuro: A/O times 3, GCS15, no focal deficits noted, sensation intact to touch, equal strength bilateral UE/LE, ambulating without difficulty  Psychiatric: Cooperative, normal mood and affect, normal judgement, appropriate for clinical situation        DIAGNOSTIC STUDIES / PROCEDURES        LABS  All labs reviewed by me.     Results for orders placed or performed during the hospital encounter of 12/20/22   POCT glucose device results   Result Value Ref Range    POC Glucose, Blood 115 (H) 65 - 99 mg/dL        RADIOLOGY  I have independently interpreted the diagnostic imaging associated with this visit and am waiting the final reading from the radiologist.   My preliminary interpretation is as follows: No displaced fracture or intracranial bleed.    DX-ANKLE 3+ VIEWS RIGHT   Final Result      Normal ankle series.      DX-FEMUR-2+ RIGHT   Final Result      Negative femur series.      DX-KNEE 3 VIEWS RIGHT   Final Result         1.  No radiographic evidence of acute injury.      DX-WRIST-COMPLETE 3+ RIGHT   Final Result         1.  No radiographic evidence of acute injury.      CT-CSPINE WITHOUT PLUS RECONS   Final Result      1.  Severe osteoarthritic changes of the C4-5 level through the C6-7 level.      2.  No evidence for acute fracture and/or subluxation      CT-HEAD W/O   Final Result         1.  Soft tissue swelling and small focal subcutaneous hematoma adjacent to the right side of the forehead.      2.  Age-related cerebral atrophy.      3.  No evidence of intracranial injury pattern.             COURSE & MEDICAL DECISION MAKING  Nursing notes, VS, PMSFHx reviewed in chart.     Review of past medical records shows the patient was seen by physical medicine and rehab in the office on November 8, 2023 regarding left lumbar  radiculitis, numbness and tingling in left arm, lumbar spondylosis.      Differential diagnoses considered include but are not limited to: Contusion, concussion/post-concussion syndrome, Fx, intracranial hemorrhage, abrasion/laceration, strain/sprain       ED Observation Status? Yes; I am placing the patient in to an observation status due to a diagnostic uncertainty as well as therapeutic intensity. Patient placed in observation status at 5:35 PM, 1/2/2024.     Observation plan is as follows: Will obtain imaging studies and monitor patient in the ED.    Upon Reevaluation, the patient's condition has: Remained stable and will be discharged.    Patient discharged from ED Observation status at 1808 on January 2, 2024.       INITIAL ASSESSMENT AND PLAN  Care Narrative: This is a 78-year-old male patient with past medical history including hypertension, sleep apnea, diabetes, depression who presents with wife to the ED for injuries after accidental mechanical fall this morning.  Given his age, will obtain imaging studies and closely monitor patient in the ED.      Discussion of management with other QHP or appropriate source(s): None     Escalation of care considered, and ultimately not performed: blood analysis and acute inpatient care management, however at this time, the patient is most appropriate for outpatient management.     Decision tools and prescription drugs considered including, but not limited to: Pain Medications   .        History and physical exam as above.  Given patient's age and head injury, CT head and C-spine were obtained and without evidence for acute traumatic findings as above.  X-ray imaging also obtained for his other injuries and without evidence for acute bony injury.  He received a tetanus shot in the ED for his abrasions.  I discussed the findings with patient and wife.  This is an alert and pleasant patient in no acute distress and nontoxic in appearance.  He does not have any focal  neurological findings or evidence for neurovascular compromise or compartment syndrome.  I discussed with them supportive home care for likely contusion/strain/sprain, outpatient follow-up and return to ED precautions.  Patient also noted with elevated blood pressure reading for which he can follow-up on outpatient basis for further management.  Patient and wife verbalized understanding and agreed with plan of care with no further questions or concerns.      The patient is referred to a primary physician for blood pressure management, diabetic screening, and for all other preventative health concerns.       FINAL IMPRESSION  1. Accidental fall, initial encounter Acute   2. Abrasion of face, initial encounter Acute   3. Closed head injury, initial encounter Acute   4. Injury of right wrist, initial encounter Acute   5. Injury of right knee, initial encounter Acute   6. Injury of right ankle, initial encounter Acute          DISPOSITION  Patient will be discharged home in stable condition.       FOLLOW UP  Leo Villegas M.D.  [address]  [facility] [id number] [phone]    Call in 1 day      [facility], Emergency Dept  [address]  [facility] [id number] [phone]    If symptoms worsen         OUTPATIENT MEDICATIONS  Discharge Medication List as of 1/2/2024  6:31 PM             Electronically signed by: Elias Parrish D.O., 1/2/2024 4:54 PM      Portions of this record were made with voice recognition software.  Despite my review, errors may remain.  Please interpret this chart in the appropriate context.

## 2024-01-04 ENCOUNTER — OFFICE VISIT (OUTPATIENT)
Dept: MEDICAL GROUP | Facility: LAB | Age: 79
End: 2024-01-04
Payer: MEDICARE

## 2024-01-04 VITALS
HEIGHT: 71 IN | BODY MASS INDEX: 41.16 KG/M2 | WEIGHT: 294 LBS | HEART RATE: 78 BPM | OXYGEN SATURATION: 94 % | SYSTOLIC BLOOD PRESSURE: 122 MMHG | DIASTOLIC BLOOD PRESSURE: 70 MMHG | TEMPERATURE: 98.1 F | RESPIRATION RATE: 16 BRPM

## 2024-01-04 DIAGNOSIS — M25.561 ACUTE PAIN OF RIGHT KNEE: ICD-10-CM

## 2024-01-04 DIAGNOSIS — M25.531 RIGHT WRIST PAIN: ICD-10-CM

## 2024-01-04 DIAGNOSIS — E78.5 HYPERLIPIDEMIA, UNSPECIFIED HYPERLIPIDEMIA TYPE: ICD-10-CM

## 2024-01-04 DIAGNOSIS — I10 PRIMARY HYPERTENSION: ICD-10-CM

## 2024-01-04 DIAGNOSIS — N40.1 LOWER URINARY TRACT SYMPTOMS DUE TO BENIGN PROSTATIC HYPERPLASIA: ICD-10-CM

## 2024-01-04 PROCEDURE — 3078F DIAST BP <80 MM HG: CPT | Performed by: FAMILY MEDICINE

## 2024-01-04 PROCEDURE — 3074F SYST BP LT 130 MM HG: CPT | Performed by: FAMILY MEDICINE

## 2024-01-04 PROCEDURE — 99214 OFFICE O/P EST MOD 30 MIN: CPT | Performed by: FAMILY MEDICINE

## 2024-01-04 RX ORDER — ROSUVASTATIN CALCIUM 10 MG/1
10 TABLET, COATED ORAL DAILY
Qty: 100 TABLET | Refills: 3 | Status: SHIPPED | OUTPATIENT
Start: 2024-01-04 | End: 2024-02-13

## 2024-01-04 RX ORDER — HYDROCODONE BITARTRATE AND ACETAMINOPHEN 5; 325 MG/1; MG/1
1 TABLET ORAL EVERY 4 HOURS PRN
Qty: 20 TABLET | Refills: 0 | Status: SHIPPED | OUTPATIENT
Start: 2024-01-04 | End: 2024-02-03

## 2024-01-04 RX ORDER — FINASTERIDE 5 MG/1
5 TABLET, FILM COATED ORAL DAILY
Qty: 90 TABLET | Refills: 3 | Status: SHIPPED | OUTPATIENT
Start: 2024-01-04

## 2024-01-04 RX ORDER — TAMSULOSIN HYDROCHLORIDE 0.4 MG/1
CAPSULE ORAL
Qty: 180 CAPSULE | Refills: 3 | Status: SHIPPED | OUTPATIENT
Start: 2024-01-04

## 2024-01-04 RX ORDER — LOSARTAN POTASSIUM AND HYDROCHLOROTHIAZIDE 25; 100 MG/1; MG/1
1 TABLET ORAL DAILY
Qty: 100 TABLET | Refills: 3 | Status: SHIPPED | OUTPATIENT
Start: 2024-01-04 | End: 2025-02-07

## 2024-01-05 NOTE — PROGRESS NOTES
Subjective:   Andrei Galeas is a 78 y.o. male here today for   No chief complaint on file.    #ED follow up   -Patient here for follow-up after being seen emergency department 2 days ago after a chemical ground-level fall that occurred on 1/2/2024.  Fall occurred while carrying boxes where he did not see the curb which caused him to trip and fall the ground.  At the time he sustained injury to right wrist, right thigh, right knee, right ankle.  During emergency room stay films were completed of head, C-spine, wrist, knee, ankle, femur all of which were unremarkable.  He received tetanus vaccine due to sustained abrasions.  -Today patient states he does have significant pain across face where bruising is rather significant.  He also continues to have pain on the ulnar aspect of her right wrist, right knee, right hamstring area.  He has been treating with acetaminophen which has been helping with the pain slightly.  He states the pain is worse when standing, walking for long distances.  He has not noticed a whole lot of improvement since being seen 2 days ago.  -Patient denies any lower extremity swelling, lower extremity/calf pain, chest pain, shortness of breath.  Denies any numbness, ting, weakness in upper or lower extremities.    #HTN  Doing well.  Reviewed labs with the patient. Taking medications as prescribed. No chest pain palpitations or shortness of breath. No headache loss or change in vision.    #HLD  Dyslipidemia: Chronic condition. Last labs are at goal.  Currently taking rosuvastatin as directed.  Denies side effects--no myalgias or abdominal pain.  Reports diet is:   He is not exercising regularly.  He denies dizziness, claudication, or chest pain.  Cholesterol,Tot   Date Value Ref Range Status   04/18/2022 109 100 - 199 mg/dL Final     HDL   Date Value Ref Range Status   04/18/2022 34 (A) >=40 mg/dL Final     LDL   Date Value Ref Range Status   04/18/2022 41 <100 mg/dL Final      Triglycerides   Date Value Ref Range Status   04/18/2022 171 (H) 0 - 149 mg/dL Final       # BPH:  -Chronic edition currently treated with finasteride, tamsulosin.  Is on tamsulosin 0.8 mg,  dose between morning and evening.  He states his symptoms are well controlled at this point.  Denies any significant side effects.  Requesting refill of medications.    Allergies   Allergen Reactions    Seasonal     Penicillins Unspecified     Childhood - unknown reaction         Current medicines (including changes today)  Current Outpatient Medications   Medication Sig Dispense Refill    tamsulosin (FLOMAX) 0.4 MG capsule TAKE 2 CAPSULES BY MOUTH ONCE DAILY AS DIRECTED AFTER A MEAL IN THE EVENING 60 Capsule 0    rosuvastatin (CRESTOR) 10 MG Tab Take 1 tablet by mouth once daily 100 Tablet 0    losartan-hydrochlorothiazide (HYZAAR) 100-25 MG per tablet Take 1 Tablet by mouth every day. 100 Tablet 1    finasteride (PROSCAR) 5 MG Tab Take 1 tablet by mouth once daily 90 Tablet 1    diphenhydrAMINE-APAP, sleep, (TYLENOL PM EXTRA STRENGTH)  MG Tab Take 1 Tablet by mouth at bedtime as needed.      gabapentin (NEURONTIN) 100 MG Cap Take 1-2 tabs po at bedtime as needed for pain 60 Capsule 1    ketoconazole (NIZORAL) 2 % Cream ketoconazole 2 % topical cream   APPLY CREAM TOPICALLY TO AFFECTED AREA ON THE SKIN TWICE DAILY      fexofenadine (ALLEGRA) 60 MG Tab Take 60 mg by mouth every day.      multivitamin (THERAGRAN) Tab Take 1 Tab by mouth every day.      fluticasone (FLONASE) 50 MCG/ACT nasal spray Spray 1 Spray in nose every day.       No current facility-administered medications for this visit.     He  has a past medical history of Arthritis, Back pain, Cataract, Depression, Diabetes ([facility]), Hypertension, Obstructive chronic bronchitis without exacerbation (8/5/2022), Shortness of breath, Sinusitis, Sleep apnea, and Wheezing.    He has no past medical history of Anginal syndrome ([facility]), Arrhythmia, Asthma, Blood  "clotting disorder (HCC), CAD (coronary artery disease), Cancer (HCC), Congestive heart failure (HCC), Cough, Dialysis patient (HCC), Dilated cardiomyopathy (HCC), Disorder of thyroid, Fall, Glaucoma, Gynecological disorder, Heart murmur, Heart valve disease, Hemorrhagic disorder (HCC), History of - myocardial infarction, Indigestion, Infectious disease, Jaundice, Myocardial infarct (HCC), Pacemaker, Painful breathing, Pneumonia, Renal disorder, Rheumatic fever, Seizure (HCC), Sputum production, Stroke (HCC), or Urinary incontinence.    ROS   -See HPI       Objective:     Physical Exam:  /70   Pulse 78   Temp 36.7 °C (98.1 °F) (Temporal)   Resp 16   Ht 1.803 m (5' 10.98\")   Wt (!) 133 kg (294 lb)   SpO2 94%  Body mass index is 41.02 kg/m².   Const: Vitals above. Well-appearing.  CV: Inspected/palpated for upper extremity edema. Bilateral radial pulses palpated, noting below if not 2+. Capillary refill evaluated distally, noting below if greater than 2 seconds.  Skin: Inspected for rash or lesions in area of concern.  Neuro/psych: Reflexes at brachioradialis (C5/6), biceps (C5/6), triceps (C7/8): 2+. 2-point discrimination assessed at 2nd finger (C6, median nerve), 3rd finger (C7, median nerve), 5th finger (C8, ulnar nerve), and dorsal web space between 1st/2nd digit (radial nerve). Phalen, Tinel's tests: Negative. Observed for normal mood/affect/insight.  MSK: normal gait. Posture: Unremarkable. Examination of bilateral wrist/hand:  - Insepected/palpated bilaterally for warmth, upper extremity carriage, ulnar variance, and for deformity and tenderness of the proximal/distal radius and ulna, scaphoid/snuffbox, carpals, metacarpals, phalanges, carpal/CMC/MCP/IP joints, and TFCC.  - Assessed passive/active range of motion bilaterally with forearm pronation/supination, wrist flexion/extension, wrist radial/ulnar deviation, MCP flexion/extension/abduction/adduction, IP joint flexion/extension, and thumb " flexion/extension/abduction/adduction/opposition, noting below for any pain or crepitation.  - Assessed muscle strength bilaterally with wrist flexion (C6/7, median/ulnar nerves), wrist extension (C7/8 radial nerve), finger extension (C7, radial nerve), finger abduction (T1, ulnar nerve), and thumb opposition (median nerve), noting below if less than 5/5 or pain. Observed for muscle atrophy in thenar, hypothenar, and interosseus areas.  - Assessed stability bilaterally at the TFCC (piano key test) and wrist, carpal, scapho-lunate (Shuck test), metacarpal, and phalangeal joints. Varus/valgus stress at MCP/IP joints: Negative. Finkelstein test negative.    All of the above were found to be normal, except:  -Tenderness palpation of the ulnar aspect of dorsal side of wrist.  Tenderness with any ulnar deviation of right hand.  No swelling noted.  No erythema noted.  No significant abrasions noted.    Assessment and Plan:     1. Right wrist pain  -Reviewed x-rays from emergency department, no signs of any osseous deformity or fracture.  Most likely strain of the ulnar ligaments on right hand.  Discussed use of wrist brace for comfort as he continues to heal.  Discussed icing, resting.  Given significant mount pains will prescribe Norco to be used in low-dose and sparingly as needed for acute exacerbation of pain.  -Obtained and reviewed patient utilization report from [facility] pharmacy database on 01/04/23 to writing prescription for controlled substance II, III or IV per [place] bill . Based on assessment of the report,my physical exam if necessary and the patient's health problem, the prescription is medically necessary.  - HYDROcodone-acetaminophen (NORCO) 5-325 MG Tab per tablet; Take 1 Tablet by mouth every four hours as needed (acute pain in wrist, knee, or leg) for up to 30 days.  Dispense: 20 Tablet; Refill: 0    2. Acute pain of right knee  -See #1  - HYDROcodone-acetaminophen (NORCO) 5-325 MG Tab per  tablet; Take 1 Tablet by mouth every four hours as needed (acute pain in wrist, knee, or leg) for up to 30 days.  Dispense: 20 Tablet; Refill: 0    3. Primary hypertension  -Chronic condition, stable.  Blood pressure is at goal.  Continue with Hyzaar.  Continue with appropriate diet and exercise regimen.  Will refill medication at this time.  - losartan-hydrochlorothiazide (HYZAAR) 100-25 MG per tablet; Take 1 Tablet by mouth every day.  Dispense: 100 Tablet; Refill: 3    4. Hyperlipidemia, unspecified hyperlipidemia type  Chronic condition, stable per continue with Crestor.  Can do the proper diet and exercise regimen.  Refill medication at this time.  - rosuvastatin (CRESTOR) 10 MG Tab; Take 1 Tablet by mouth every day.  Dispense: 100 Tablet; Refill: 3    5. Lower urinary tract symptoms due to benign prostatic hyperplasia  -Chronic addition, stable.  Continue Flomax, finasteride.  Will refill medications.  - tamsulosin (FLOMAX) 0.4 MG capsule; TAKE 2 CAPSULES BY MOUTH ONCE DAILY AS DIRECTED AFTER A MEAL IN THE EVENING  Dispense: 180 Capsule; Refill: 3  - finasteride (PROSCAR) 5 MG Tab; Take 1 Tablet by mouth every day.  Dispense: 90 Tablet; Refill: 3      HCC Gap Form    Diagnosis: E66.01 - Morbid (severe) obesity due to excess calories (HCC)  Z68.41 - Body mass index (BMI) 40.0-44.9, adult (HCC)  The current BMI is 41.02 kg/m2 as of 01/04/24 16:56 PST  Assessment and plan: Chronic, stable. Encouraged healthy diet and physical activity changes with a goal of weight loss. Follow up at least annually.  Diagnosis to address: I70.0 - Atherosclerosis of aorta (HCC)  Assessment and plan: Chronic, stable. Continue with current defined treatment plan: Continue with rosuvastatin daily.  Continue the proper diet and exercise regimen.. Follow-up at least annually.  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 Continue with current defined treatment plan: Continue with  routine imaging.. Follow-up at least annually.  Diagnosis: J44.89 - Obstructive chronic bronchitis without exacerbation  Assessment and plan: Chronic, stable. Continue with current defined treatment plan: Continue follow-up with pulmonology.  Continue Spiriva. Follow-up at least annually.  Last edited 01/04/24 16:58 PST by Leo Villegas M.D.           Followup: No follow-ups on file.         PLEASE NOTE: This dictation was created using voice recognition software. I have made every reasonable attempt to correct obvious errors, but I expect that there are errors of grammar and possibly content that I did not discover before finalizing the note.

## 2024-01-08 ENCOUNTER — TELEPHONE (OUTPATIENT)
Dept: MEDICAL GROUP | Facility: LAB | Age: 79
End: 2024-01-08
Payer: MEDICARE

## 2024-01-08 NOTE — TELEPHONE ENCOUNTER
Phone Number Called: [phone] (home)       Call outcome: Left detailed message for patient. Informed to call back with any additional questions.    Message: Notified patient pain meds were sent to [facility] [address], I see [facility] is also listed on pharmacy listed requesting update.

## 2024-01-08 NOTE — TELEPHONE ENCOUNTER
1. Caller Name: Andrei Galeas                        Call Back Number:[phone] (home)         How would the patient prefer to be contacted with a response: Phone call OK to leave a detailed message    Patient called and LVM inquiring about his pain meds and if they were sent to a pharmacy.

## 2024-02-01 ENCOUNTER — OFFICE VISIT (OUTPATIENT)
Dept: MEDICAL GROUP | Facility: LAB | Age: 79
End: 2024-02-01
Payer: MEDICARE

## 2024-02-01 VITALS
RESPIRATION RATE: 15 BRPM | BODY MASS INDEX: 41.07 KG/M2 | HEART RATE: 71 BPM | SYSTOLIC BLOOD PRESSURE: 110 MMHG | DIASTOLIC BLOOD PRESSURE: 78 MMHG | HEIGHT: 71 IN | OXYGEN SATURATION: 95 % | TEMPERATURE: 97.2 F

## 2024-02-01 DIAGNOSIS — M25.531 RIGHT WRIST PAIN: ICD-10-CM

## 2024-02-01 PROCEDURE — 3074F SYST BP LT 130 MM HG: CPT | Performed by: FAMILY MEDICINE

## 2024-02-01 PROCEDURE — 3078F DIAST BP <80 MM HG: CPT | Performed by: FAMILY MEDICINE

## 2024-02-01 PROCEDURE — 99213 OFFICE O/P EST LOW 20 MIN: CPT | Performed by: FAMILY MEDICINE

## 2024-02-02 NOTE — PROGRESS NOTES
Subjective:   Andrei Galeas is a 78 y.o. male here today for   No chief complaint on file.    #Right wrist pain   -Patient here to follow-up after emergency room visit on 1/2/2024 where he sustained due to his right wrist after ground-level fall.  He did follow-up with me on 1/4/2024.  Treated pain with prescription of Rose.  -Today patient states he is doing well, symptoms are resolving.  Denies any significant numbness, tingling, weakness in the hand.      Allergies   Allergen Reactions    Seasonal     Penicillins Unspecified     Childhood - unknown reaction         Current medicines (including changes today)  Current Outpatient Medications   Medication Sig Dispense Refill    HYDROcodone-acetaminophen (NORCO) 5-325 MG Tab per tablet Take 1 Tablet by mouth every four hours as needed (acute pain in wrist, knee, or leg) for up to 30 days. 20 Tablet 0    tamsulosin (FLOMAX) 0.4 MG capsule TAKE 2 CAPSULES BY MOUTH ONCE DAILY AS DIRECTED AFTER A MEAL IN THE EVENING 180 Capsule 3    finasteride (PROSCAR) 5 MG Tab Take 1 Tablet by mouth every day. 90 Tablet 3    losartan-hydrochlorothiazide (HYZAAR) 100-25 MG per tablet Take 1 Tablet by mouth every day. 100 Tablet 3    rosuvastatin (CRESTOR) 10 MG Tab Take 1 Tablet by mouth every day. 100 Tablet 3    diphenhydrAMINE-APAP, sleep, (TYLENOL PM EXTRA STRENGTH)  MG Tab Take 1 Tablet by mouth at bedtime as needed.      gabapentin (NEURONTIN) 100 MG Cap Take 1-2 tabs po at bedtime as needed for pain 60 Capsule 1    ketoconazole (NIZORAL) 2 % Cream ketoconazole 2 % topical cream   APPLY CREAM TOPICALLY TO AFFECTED AREA ON THE SKIN TWICE DAILY      fexofenadine (ALLEGRA) 60 MG Tab Take 60 mg by mouth every day.      multivitamin (THERAGRAN) Tab Take 1 Tab by mouth every day.      fluticasone (FLONASE) 50 MCG/ACT nasal spray Spray 1 Spray in nose every day.       No current facility-administered medications for this visit.     He  has a past medical history of  "Arthritis, Back pain, Cataract, Depression, Diabetes ([facility]), Hypertension, Obstructive chronic bronchitis without exacerbation (8/5/2022), Shortness of breath, Sinusitis, Sleep apnea, and Wheezing.    He has no past medical history of Anginal syndrome ([facility]), Arrhythmia, Asthma, Blood clotting disorder ([facility]), CAD (coronary artery disease), Cancer ([facility]), Congestive heart failure ([facility]), Cough, Dialysis patient ([facility]), Dilated cardiomyopathy ([facility]), Disorder of thyroid, Fall, Glaucoma, Gynecological disorder, Heart murmur, Heart valve disease, Hemorrhagic disorder ([facility]), History of - myocardial infarction, Indigestion, Infectious disease, Jaundice, Myocardial infarct ([facility]), Pacemaker, Painful breathing, Pneumonia, Renal disorder, Rheumatic fever, Seizure ([facility]), Sputum production, Stroke ([facility]), or Urinary incontinence.    ROS   -See HPI       Objective:     Physical Exam:  /78   Pulse 71   Temp 36.2 °C (97.2 °F) (Temporal)   Resp 15   Ht 1.802 m (5' 10.95\")   SpO2 95%  Body mass index is 41.07 kg/m².   Constitutional: Alert, no distress, well-groomed.  Skin: No rashes in visible areas.  Eye: Round. Conjunctiva clear, lids normal. No icterus.   ENMT: Lips pink without lesions, good dentition, moist mucous membranes. Phonation normal.  Neck: No masses, no thyromegaly. Moves freely without pain.  Respiratory: Unlabored respiratory effort, no cough or audible wheeze  Psych: Alert and oriented x3, normal affect and mood.    Assessment and Plan:     1. Right wrist pain  -Patient showed significant improvement since last seen.  A lot of the scrapes and burns have been healed.  He is handling pain with topical occasion, OTC medication which we will continue if needed.  No further concerns, no concerns regarding any orthopedic follow-up.  Return precautions given.        Followup: No follow-ups on file.         PLEASE NOTE: This dictation was created using voice recognition software. I have made every reasonable attempt " to correct obvious errors, but I expect that there are errors of grammar and possibly content that I did not discover before finalizing the note.

## 2024-02-06 ENCOUNTER — APPOINTMENT (RX ONLY)
Dept: URBAN - METROPOLITAN AREA CLINIC 35 | Facility: CLINIC | Age: 79
Setting detail: DERMATOLOGY
End: 2024-02-06

## 2024-02-06 DIAGNOSIS — L92.0 GRANULOMA ANNULARE: ICD-10-CM

## 2024-02-06 PROBLEM — L30.9 DERMATITIS, UNSPECIFIED: Status: ACTIVE | Noted: 2024-02-06

## 2024-02-06 PROCEDURE — ? BIOPSY BY PUNCH METHOD

## 2024-02-06 PROCEDURE — ? COUNSELING

## 2024-02-06 PROCEDURE — 11104 PUNCH BX SKIN SINGLE LESION: CPT

## 2024-02-06 ASSESSMENT — LOCATION DETAILED DESCRIPTION DERM
LOCATION DETAILED: RIGHT PROXIMAL PRETIBIAL REGION
LOCATION DETAILED: LEFT DISTAL DORSAL FOREARM
LOCATION DETAILED: LEFT DISTAL POSTERIOR UPPER ARM
LOCATION DETAILED: LEFT PROXIMAL PRETIBIAL REGION
LOCATION DETAILED: LEFT ANTERIOR PROXIMAL THIGH
LOCATION DETAILED: RIGHT PROXIMAL DORSAL FOREARM
LOCATION DETAILED: RIGHT DISTAL DORSAL FOREARM
LOCATION DETAILED: RIGHT ANTERIOR PROXIMAL THIGH
LOCATION DETAILED: RIGHT PROXIMAL POSTERIOR UPPER ARM

## 2024-02-06 ASSESSMENT — LOCATION ZONE DERM
LOCATION ZONE: ARM
LOCATION ZONE: LEG

## 2024-02-06 ASSESSMENT — LOCATION SIMPLE DESCRIPTION DERM
LOCATION SIMPLE: LEFT FOREARM
LOCATION SIMPLE: LEFT UPPER ARM
LOCATION SIMPLE: LEFT THIGH
LOCATION SIMPLE: RIGHT PRETIBIAL REGION
LOCATION SIMPLE: RIGHT UPPER ARM
LOCATION SIMPLE: LEFT PRETIBIAL REGION
LOCATION SIMPLE: RIGHT FOREARM
LOCATION SIMPLE: RIGHT THIGH

## 2024-02-12 ENCOUNTER — APPOINTMENT (RX ONLY)
Dept: URBAN - METROPOLITAN AREA CLINIC 35 | Facility: CLINIC | Age: 79
Setting detail: DERMATOLOGY
End: 2024-02-12

## 2024-02-12 DIAGNOSIS — E78.5 HYPERLIPIDEMIA, UNSPECIFIED HYPERLIPIDEMIA TYPE: ICD-10-CM

## 2024-02-12 DIAGNOSIS — Z48.02 ENCOUNTER FOR REMOVAL OF SUTURES: ICD-10-CM

## 2024-02-12 PROCEDURE — ? SUTURE REMOVAL (GLOBAL PERIOD)

## 2024-02-12 ASSESSMENT — LOCATION ZONE DERM: LOCATION ZONE: ARM

## 2024-02-12 ASSESSMENT — LOCATION DETAILED DESCRIPTION DERM: LOCATION DETAILED: RIGHT PROXIMAL DORSAL FOREARM

## 2024-02-12 ASSESSMENT — LOCATION SIMPLE DESCRIPTION DERM: LOCATION SIMPLE: RIGHT FOREARM

## 2024-02-12 NOTE — TELEPHONE ENCOUNTER
Received request via: Pharmacy    Was the patient seen in the last year in this department? Yes    Does the patient have an active prescription (recently filled or refills available) for medication(s) requested? YES    Pharmacy Name: [facility]    Does the patient have nursing home Plus and need 100 day supply (blood pressure, diabetes and cholesterol meds only)? Yes, quantity updated to 100 days

## 2024-02-12 NOTE — PROCEDURE: SUTURE REMOVAL (GLOBAL PERIOD)
Detail Level: Detailed
Add 64872 Cpt? (Important Note: In 2017 The Use Of 46041 Is Being Tracked By Cms To Determine Future Global Period Reimbursement For Global Periods): no

## 2024-02-13 RX ORDER — ROSUVASTATIN CALCIUM 10 MG/1
10 TABLET, COATED ORAL DAILY
Qty: 100 TABLET | Refills: 3 | Status: SHIPPED | OUTPATIENT
Start: 2024-02-13 | End: 2025-03-19

## 2024-02-22 ENCOUNTER — TELEPHONE (OUTPATIENT)
Dept: MEDICAL GROUP | Facility: LAB | Age: 79
End: 2024-02-22
Payer: MEDICARE

## 2024-02-22 DIAGNOSIS — G47.33 OBSTRUCTIVE SLEEP APNEA: ICD-10-CM

## 2024-02-22 DIAGNOSIS — J44.89 OBSTRUCTIVE CHRONIC BRONCHITIS WITHOUT EXACERBATION (HCC): ICD-10-CM

## 2024-02-22 NOTE — TELEPHONE ENCOUNTER
Patient called and LVM request referral to pulmonary. Previously review chart   follow-up on COPD and KATIA.     Patient request call back once completed.

## 2024-02-23 NOTE — TELEPHONE ENCOUNTER
Called and notified patient referral signed, Getting processed by the team for approval to an office, notified to call back if patient trujillo snot hear not them with in 1 week or so.

## 2024-02-28 DIAGNOSIS — G47.33 OBSTRUCTIVE SLEEP APNEA: ICD-10-CM

## 2024-03-04 ENCOUNTER — OFFICE VISIT (OUTPATIENT)
Dept: SLEEP MEDICINE | Facility: MEDICAL CENTER | Age: 79
End: 2024-03-04
Attending: NURSE PRACTITIONER
Payer: MEDICARE

## 2024-03-04 VITALS
HEIGHT: 71 IN | WEIGHT: 274.5 LBS | OXYGEN SATURATION: 93 % | RESPIRATION RATE: 16 BRPM | BODY MASS INDEX: 38.43 KG/M2 | SYSTOLIC BLOOD PRESSURE: 164 MMHG | DIASTOLIC BLOOD PRESSURE: 62 MMHG | HEART RATE: 67 BPM

## 2024-03-04 DIAGNOSIS — G47.33 OSA ON CPAP: ICD-10-CM

## 2024-03-04 PROCEDURE — 3078F DIAST BP <80 MM HG: CPT | Performed by: NURSE PRACTITIONER

## 2024-03-04 PROCEDURE — 99214 OFFICE O/P EST MOD 30 MIN: CPT | Performed by: NURSE PRACTITIONER

## 2024-03-04 PROCEDURE — 3077F SYST BP >= 140 MM HG: CPT | Performed by: NURSE PRACTITIONER

## 2024-03-04 PROCEDURE — 99213 OFFICE O/P EST LOW 20 MIN: CPT | Performed by: NURSE PRACTITIONER

## 2024-03-04 RX ORDER — TRAZODONE HYDROCHLORIDE 100 MG/1
100 TABLET ORAL NIGHTLY
Qty: 30 TABLET | Refills: 3 | Status: SHIPPED | OUTPATIENT
Start: 2024-03-04

## 2024-03-04 ASSESSMENT — PATIENT HEALTH QUESTIONNAIRE - PHQ9: CLINICAL INTERPRETATION OF PHQ2 SCORE: 0

## 2024-03-06 NOTE — PROGRESS NOTES
Chief Complaint   Patient presents with    Follow-Up     Last seen 02/272022 Khloe Jennings   Chronic Bronchitis F/V       HPI:  Andrei Galeas is a 78 y.o. year old male here today for follow-up on KATIA..  Last seen 12/29/2021 by Dr. Saxena patient is a previous [facility] patient with studies done approximately 50 to 20 years ago.He has recently moved back to [place] full-time and is establishing care in sleep medicine at [facility].  She was previously treated in [place] at [facility] near [facility].  Previous DME was [employer]  Patient believes that this company is affiliated with [employer].    Patient would like to inquire about receiving new CPAP device.  He wants to set up with new DME company, [employer].  Currently using a nasal pillow with heated tubing.  Patient does state that he is unable to stay asleep at nighttime.  He wakes up frequently.  He also experiences daytime fatigue and does nap.  He uses CPAP whenever sleeping.  Denies any excessive daytime sleepiness, morning headaches, palpitations, concentration or memory problems.  Previously he was trying to use Benadryl to help him stay asleep without finding medication effective.    Today compliance reviewed with patient shows 100% use with an average time of 9 hours and 25 minutes and resultant AHI of 4.4.  In setting a CPAP of 7 cm/H2O.    Most recent sleep study was done December 3, 2013 which showed an AHI of 33.8/h.  Patient has been compliant with CPAP at 7 cm/H2O.  Sleep study was done at [facility].    ROS: As per HPI and otherwise negative if not stated.    Past Medical History:   Diagnosis Date    Arthritis     Back pain     Cataract     Depression     Diabetes (HCC)     borderline    Hypertension     Obstructive chronic bronchitis without exacerbation 8/5/2022    Shortness of breath     Sinusitis     Sleep apnea     Wheezing        Past Surgical History:   Procedure Laterality Date    SC DSTR NROLYTC AGNT PARVERTEB FCT SNGL LMBR/SACRAL  "Bilateral 7/6/2023    Procedure: RIGHT and LEFT radiofrequency neurotomies medial branch targeting the L4-5 and L5-S1 facet joints with fluoroscopic guidance and sedation, Plan for 80 °C for 90 seconds for each neurotomy;  Surgeon: Claudia Cervantes M.D.;  [facility];  Service: Pain Management    ME INJ DX/THER AGNT PARAVERT FACET JOINT, CELESTE* Bilateral 6/1/2023    Procedure: Diagnostic medial branch blocks targeting the BILATERAL L4-5 and L5-S1 facet joints with fluoroscopic guidance #2;  Surgeon: Claudia Cervantes M.D.;  Location: [facility];  Service: Pain Management    INJ,ANES LUMBAR/CERVICAL Bilateral 5/16/2023    Procedure: Diagnostic medial branch blocks targeting the BILATERAL L4-5 and L5-S1 facet joints with fluoroscopic guidance #1;  Surgeon: Claudia Cervantes M.D.;  Location: [facility];  Service: Pain Management    ME INJ LUMBAR/SACRAL,W/ IMAGING Left 12/20/2022    Procedure: LEFT lumbar L5-S1 interlaminar epidural steroid injection.;  Surgeon: Claudia Cervantes M.D.;  Location: [facility];  Service: Pain Management    SINUSOTOMIES         Family History   Problem Relation Age of Onset    Heart Disease Paternal Uncle         MI    Arthritis Mother     Psychiatric Illness Father         stomach cancer    Hypertension Father     Hyperlipidemia Father     Stroke Father        Allergies as of 03/04/2024 - Reviewed 03/04/2024   Allergen Reaction Noted    Seasonal  05/05/2023    Penicillins Unspecified 09/13/2013        Vitals:  BP (!) 164/62 (BP Location: Left arm, Patient Position: Sitting, BP Cuff Size: Adult)   Pulse 67   Resp 16   Ht 1.803 m (5' 11\")   Wt 125 kg (274 lb 8 oz)   SpO2 93%     Current medications as of today   Current Outpatient Medications   Medication Sig Dispense Refill    traZODone (DESYREL) 100 MG Tab Take 1 Tablet by mouth every evening. 30 Tablet 3    rosuvastatin (CRESTOR) 10 MG Tab Take 1 Tablet by " mouth every day. 100 Tablet 3    tamsulosin (FLOMAX) 0.4 MG capsule TAKE 2 CAPSULES BY MOUTH ONCE DAILY AS DIRECTED AFTER A MEAL IN THE EVENING 180 Capsule 3    finasteride (PROSCAR) 5 MG Tab Take 1 Tablet by mouth every day. 90 Tablet 3    losartan-hydrochlorothiazide (HYZAAR) 100-25 MG per tablet Take 1 Tablet by mouth every day. 100 Tablet 3    diphenhydrAMINE-APAP, sleep, (TYLENOL PM EXTRA STRENGTH)  MG Tab Take 1 Tablet by mouth at bedtime as needed.      fexofenadine (ALLEGRA) 60 MG Tab Take 60 mg by mouth every day.      multivitamin (THERAGRAN) Tab Take 1 Tab by mouth every day.      fluticasone (FLONASE) 50 MCG/ACT nasal spray Spray 1 Spray in nose every day.      gabapentin (NEURONTIN) 100 MG Cap Take 1-2 tabs po at bedtime as needed for pain (Patient not taking: Reported on 3/4/2024) 60 Capsule 1    ketoconazole (NIZORAL) 2 % Cream ketoconazole 2 % topical cream   APPLY CREAM TOPICALLY TO AFFECTED AREA ON THE SKIN TWICE DAILY (Patient not taking: Reported on 3/4/2024)       No current facility-administered medications for this visit.         Physical Exam:   Gen:           Alert and oriented, No apparent distress. Mood and affect appropriate, normal interaction with examiner.  Eyes:          PERRL, EOM intact, sclere white, conjunctive moist.  Ears:          Not examined.   Hearing:     Grossly intact.  Nose:          Normal, no lesions or deformities.  Dentition:    Good dentition.  Oropharynx:   Tongue normal, posterior pharynx without erythema or exudate.  Neck:        Supple, trachea midline, no masses.  Respiratory Effort: No intercostal retractions or use of accessory muscles.   Lung Auscultation:      Clear to auscultation bilaterally; no rales, rhonchi or wheezing.  CV:            Regular rate and rhythm. No murmurs, rubs or gallops.  Abd:           Not examined.   Lymphadenopathy: Not examined.  Gait and Station: Normal.  Digits and Nails: No clubbing, cyanosis, petechiae, or nodes.    Cranial Nerves: II-XII grossly intact.  Skin:        No rashes, lesions or ulcers noted.               Ext:           No cyanosis or edema.      Assessment:  1. KATIA on CPAP  DME CPAP    Overnight Home Sleep Study    traZODone (DESYREL) 100 MG Tab    Overnight Oximetry          Plan:  1.  Patient is using and benefiting from CPAP therapy.  Compliance shows adequate use and control of KATIA.  Order will be placed for mask and supplies through new [employer].  Also order overnight pulse oximetry.  Patient advised to follow-up within 3 months.  Quest for medical records through [facility] [place] was placed and we did receive results.  Patient was previously diagnosed at [facility] around 2013.  Patient will update home sleep study.  Patient also working on discrepancy with [employer] due to previously distributed CPAP.  Has been on CPAP at current settings for many years and has been compliant.    Please note that this dictation was created using voice recognition software. I have made every reasonable attempt to correct obvious errors, but it is possible there are errors of grammar and possibly content that I did not discover before finalizing the note.

## 2024-03-20 ENCOUNTER — HOME STUDY (OUTPATIENT)
Dept: SLEEP MEDICINE | Facility: MEDICAL CENTER | Age: 79
End: 2024-03-20
Attending: NURSE PRACTITIONER
Payer: MEDICARE

## 2024-03-20 DIAGNOSIS — G47.33 OSA ON CPAP: ICD-10-CM

## 2024-03-20 PROCEDURE — 94762 N-INVAS EAR/PLS OXIMTRY CONT: CPT | Performed by: STUDENT IN AN ORGANIZED HEALTH CARE EDUCATION/TRAINING PROGRAM

## 2024-03-20 PROCEDURE — 94762 N-INVAS EAR/PLS OXIMTRY CONT: CPT | Performed by: NURSE PRACTITIONER

## 2024-03-27 NOTE — PROCEDURES
Overnight Pulse Oximetry    Interpretation:    This overnight oximetry was recorded on 3/20/2024 with the patient on CPAP 7 cmH2O.  The analysis duration was 5hrs 57min.  The saturations were less than 90% for 13.4% of the recording.  Basal oxygen saturation of 91.3%. The georgie saturation was 70% (likely artifact).  The patient spent 5.1 minutes with saturations < 88%.  Overall, this continuous nocturnal oximetry demonstrates unremarkable study while on CPAP 7 cmH2O.    Recommendation:  Continue current therapy.  There were disruptions in the night regarding signal which likely led to time at or below 88% saturation.  Majority of the night oxygen saturations maintained above 88% on CPAP.  Clinical correlation is needed.

## 2024-04-02 ENCOUNTER — APPOINTMENT (RX ONLY)
Dept: URBAN - METROPOLITAN AREA CLINIC 35 | Facility: CLINIC | Age: 79
Setting detail: DERMATOLOGY
End: 2024-04-02

## 2024-04-02 DIAGNOSIS — L92.0 GRANULOMA ANNULARE: ICD-10-CM

## 2024-04-02 DIAGNOSIS — L82.0 INFLAMED SEBORRHEIC KERATOSIS: ICD-10-CM

## 2024-04-02 DIAGNOSIS — L57.0 ACTINIC KERATOSIS: ICD-10-CM

## 2024-04-02 DIAGNOSIS — D18.0 HEMANGIOMA: ICD-10-CM

## 2024-04-02 DIAGNOSIS — L82.1 OTHER SEBORRHEIC KERATOSIS: ICD-10-CM

## 2024-04-02 DIAGNOSIS — D22 MELANOCYTIC NEVI: ICD-10-CM

## 2024-04-02 DIAGNOSIS — L56.5 DISSEMINATED SUPERFICIAL ACTINIC POROKERATOSIS (DSAP): ICD-10-CM

## 2024-04-02 DIAGNOSIS — L81.4 OTHER MELANIN HYPERPIGMENTATION: ICD-10-CM

## 2024-04-02 DIAGNOSIS — L85.3 XEROSIS CUTIS: ICD-10-CM

## 2024-04-02 DIAGNOSIS — Z71.89 OTHER SPECIFIED COUNSELING: ICD-10-CM

## 2024-04-02 PROBLEM — D18.01 HEMANGIOMA OF SKIN AND SUBCUTANEOUS TISSUE: Status: ACTIVE | Noted: 2024-04-02

## 2024-04-02 PROBLEM — D22.9 MELANOCYTIC NEVI, UNSPECIFIED: Status: ACTIVE | Noted: 2024-04-02

## 2024-04-02 PROCEDURE — 11900 INJECT SKIN LESIONS </W 7: CPT | Mod: 59

## 2024-04-02 PROCEDURE — ? ORDER FOR PHOTODYNAMIC THERAPY

## 2024-04-02 PROCEDURE — 17000 DESTRUCT PREMALG LESION: CPT | Mod: 59

## 2024-04-02 PROCEDURE — 17003 DESTRUCT PREMALG LES 2-14: CPT | Mod: 59

## 2024-04-02 PROCEDURE — ? INTRALESIONAL KENALOG

## 2024-04-02 PROCEDURE — 17110 DESTRUCTION B9 LES UP TO 14: CPT

## 2024-04-02 PROCEDURE — ? ADDITIONAL NOTES

## 2024-04-02 PROCEDURE — ? LIQUID NITROGEN

## 2024-04-02 PROCEDURE — ? COUNSELING

## 2024-04-02 PROCEDURE — 99213 OFFICE O/P EST LOW 20 MIN: CPT | Mod: 25

## 2024-04-02 ASSESSMENT — LOCATION DETAILED DESCRIPTION DERM
LOCATION DETAILED: RIGHT SUPERIOR LATERAL UPPER BACK
LOCATION DETAILED: LEFT MID-UPPER BACK
LOCATION DETAILED: RIGHT SUPERIOR UPPER BACK
LOCATION DETAILED: LEFT DISTAL POSTERIOR UPPER ARM
LOCATION DETAILED: LEFT MEDIAL SUPERIOR CHEST
LOCATION DETAILED: RIGHT LATERAL UPPER BACK
LOCATION DETAILED: RIGHT PROXIMAL POSTERIOR UPPER ARM
LOCATION DETAILED: RIGHT CENTRAL MALAR CHEEK
LOCATION DETAILED: RIGHT ANTERIOR DISTAL UPPER ARM
LOCATION DETAILED: LEFT CLAVICULAR SKIN
LOCATION DETAILED: LEFT SUPERIOR LATERAL UPPER BACK
LOCATION DETAILED: LEFT LATERAL SUPERIOR CHEST
LOCATION DETAILED: LEFT INFERIOR CENTRAL MALAR CHEEK
LOCATION DETAILED: RIGHT PROXIMAL DORSAL FOREARM
LOCATION DETAILED: LEFT MEDIAL FOREHEAD
LOCATION DETAILED: LEFT PROXIMAL DORSAL FOREARM
LOCATION DETAILED: RIGHT ANTERIOR PROXIMAL THIGH
LOCATION DETAILED: RIGHT MID-UPPER BACK
LOCATION DETAILED: EPIGASTRIC SKIN
LOCATION DETAILED: LEFT INFERIOR UPPER BACK
LOCATION DETAILED: LEFT LATERAL PROXIMAL UPPER ARM
LOCATION DETAILED: RIGHT MEDIAL TRAPEZIAL NECK
LOCATION DETAILED: LEFT DISTAL DORSAL FOREARM
LOCATION DETAILED: LEFT ANTERIOR DISTAL UPPER ARM
LOCATION DETAILED: LEFT SUPERIOR MEDIAL UPPER BACK
LOCATION DETAILED: RIGHT VENTRAL MEDIAL PROXIMAL FOREARM
LOCATION DETAILED: RIGHT CLAVICULAR SKIN
LOCATION DETAILED: STERNUM
LOCATION DETAILED: LEFT PROXIMAL PRETIBIAL REGION
LOCATION DETAILED: LEFT ANTERIOR PROXIMAL THIGH
LOCATION DETAILED: LEFT SUPERIOR UPPER BACK
LOCATION DETAILED: RIGHT CLAVICULAR NECK
LOCATION DETAILED: LEFT VENTRAL DISTAL FOREARM
LOCATION DETAILED: INFERIOR THORACIC SPINE
LOCATION DETAILED: RIGHT PROXIMAL PRETIBIAL REGION
LOCATION DETAILED: LEFT ANTERIOR DISTAL THIGH
LOCATION DETAILED: RIGHT DISTAL DORSAL FOREARM
LOCATION DETAILED: LEFT LATERAL DISTAL UPPER ARM

## 2024-04-02 ASSESSMENT — LOCATION SIMPLE DESCRIPTION DERM
LOCATION SIMPLE: RIGHT THIGH
LOCATION SIMPLE: POSTERIOR NECK
LOCATION SIMPLE: LEFT CHEEK
LOCATION SIMPLE: LEFT FOREHEAD
LOCATION SIMPLE: RIGHT UPPER BACK
LOCATION SIMPLE: LEFT THIGH
LOCATION SIMPLE: RIGHT CHEEK
LOCATION SIMPLE: ABDOMEN
LOCATION SIMPLE: RIGHT UPPER ARM
LOCATION SIMPLE: LEFT UPPER BACK
LOCATION SIMPLE: RIGHT ANTERIOR NECK
LOCATION SIMPLE: RIGHT PRETIBIAL REGION
LOCATION SIMPLE: LEFT PRETIBIAL REGION
LOCATION SIMPLE: LEFT UPPER ARM
LOCATION SIMPLE: RIGHT FOREARM
LOCATION SIMPLE: CHEST
LOCATION SIMPLE: LEFT CLAVICULAR SKIN
LOCATION SIMPLE: RIGHT CLAVICULAR SKIN
LOCATION SIMPLE: UPPER BACK
LOCATION SIMPLE: LEFT FOREARM

## 2024-04-02 ASSESSMENT — LOCATION ZONE DERM
LOCATION ZONE: NECK
LOCATION ZONE: FACE
LOCATION ZONE: TRUNK
LOCATION ZONE: ARM
LOCATION ZONE: LEG

## 2024-04-02 NOTE — PROCEDURE: LIQUID NITROGEN
Detail Level: Detailed
Show Applicator Variable?: Yes
Duration Of Freeze Thaw-Cycle (Seconds): 10
Consent: The patient's consent was obtained including but not limited to risks of crusting, scabbing, blistering, scarring, darker or lighter pigmentary change, recurrence, incomplete removal and infection.
Application Tool (Optional): Cry-AC
Render Post-Care Instructions In Note?: no
Post-Care Instructions: I reviewed with the patient in detail post-care instructions. Patient is to wear sunprotection, and avoid picking at any of the treated lesions. Pt may apply Vaseline to crusted or scabbing areas.
Number Of Freeze-Thaw Cycles: 1 freeze-thaw cycle
Number Of Freeze-Thaw Cycles: 2 freeze-thaw cycles
Spray Paint Text: The liquid nitrogen was applied to the skin utilizing a spray paint frosting technique.
Medical Necessity Information: It is in your best interest to select a reason for this procedure from the list below. All of these items fulfill various CMS LCD requirements except the new and changing color options.
Medical Necessity Clause: This procedure was medically necessary because the lesions that were treated were:

## 2024-04-02 NOTE — PROCEDURE: INTRALESIONAL KENALOG
Kenalog Preparation: Kenalog with normal saline
Consent: The risks of atrophy were reviewed with the patient.
Which Kenalog Vial Was Used?: Kenalog 40 mg/ml (10 ml vial)
Lot # For Kenalog (Optional): [id number]
How Many Mls Were Removed From The 10 Mg/Ml (5ml) Vial When Preparing The Injectable Solution?: 0
Concentration Of Kenalog Solution Injected (Mg/Ml): 2.5
Bill For Wasted Drug (Kenalog)?: no
Kenalog Type Of Vial: Multiple Dose
Total Volume (Ccs): 0.4
Medical Necessity Clause: This procedure was medically necessary because the lesions that were treated were:
Treatment Number (Optional): 1
Validate Note Data When Using Inventory: Yes
Expiration Date For Kenalog (Optional): 10/2024
Detail Level: Detailed

## 2024-04-02 NOTE — PROCEDURE: ADDITIONAL NOTES
Render Risk Assessment In Note?: no
Detail Level: Simple
Additional Notes: Biopsy proven.  [id number] on 02/08/2024.

## 2024-04-02 NOTE — PROCEDURE: ORDER FOR PHOTODYNAMIC THERAPY
Chest Incubation Time: 2 Hours
Consent: Written consent was obtained today.  The procedure and risks were reviewed with the patient including but not limited to: burning, pigmentary changes, pain, blistering, scabbing, redness, and the possibility of needing numerous treatments. Strict photoprotection after the procedure was also discussed.
Neck Incubation Time: 1 Hour
Occlusion: No
Face Incubation Time: 1.5 Hour
Photosensitizer: Levulan
Face And Scalp Incubation Time: 1 Hour for the face and 2 Hours for the scalp
Detail Level: Zone
Location: Arms
Frequency Of Pdt: Two treatments
Pdt Type: Blue Light
Location: Right Leg
Location: Left Leg

## 2024-04-03 ENCOUNTER — APPOINTMENT (OUTPATIENT)
Dept: RADIOLOGY | Facility: MEDICAL CENTER | Age: 79
End: 2024-04-03
Attending: STUDENT IN AN ORGANIZED HEALTH CARE EDUCATION/TRAINING PROGRAM
Payer: MEDICARE

## 2024-04-03 PROCEDURE — 72148 MRI LUMBAR SPINE W/O DYE: CPT

## 2024-04-10 ENCOUNTER — OFFICE VISIT (OUTPATIENT)
Dept: PHYSICAL MEDICINE AND REHAB | Facility: MEDICAL CENTER | Age: 79
End: 2024-04-10
Payer: MEDICARE

## 2024-04-10 VITALS
HEART RATE: 82 BPM | DIASTOLIC BLOOD PRESSURE: 64 MMHG | OXYGEN SATURATION: 93 % | WEIGHT: 284.39 LBS | BODY MASS INDEX: 39.66 KG/M2 | SYSTOLIC BLOOD PRESSURE: 152 MMHG | TEMPERATURE: 98.2 F

## 2024-04-10 DIAGNOSIS — M54.2 CERVICALGIA: ICD-10-CM

## 2024-04-10 DIAGNOSIS — G89.29 CHRONIC MIDLINE LOW BACK PAIN WITHOUT SCIATICA: ICD-10-CM

## 2024-04-10 DIAGNOSIS — M47.816 LUMBAR SPONDYLOSIS: ICD-10-CM

## 2024-04-10 DIAGNOSIS — R20.0 NUMBNESS AND TINGLING IN LEFT ARM: ICD-10-CM

## 2024-04-10 DIAGNOSIS — R20.2 NUMBNESS AND TINGLING IN LEFT ARM: ICD-10-CM

## 2024-04-10 DIAGNOSIS — M54.50 CHRONIC MIDLINE LOW BACK PAIN WITHOUT SCIATICA: ICD-10-CM

## 2024-04-10 DIAGNOSIS — M79.10 MYALGIA: ICD-10-CM

## 2024-04-10 DIAGNOSIS — M54.16 LEFT LUMBAR RADICULITIS: ICD-10-CM

## 2024-04-10 DIAGNOSIS — M25.562 CHRONIC PAIN OF LEFT KNEE: ICD-10-CM

## 2024-04-10 DIAGNOSIS — R20.2 NUMBNESS AND TINGLING OF RIGHT ARM: ICD-10-CM

## 2024-04-10 DIAGNOSIS — M25.531 RIGHT WRIST PAIN: ICD-10-CM

## 2024-04-10 DIAGNOSIS — R20.0 NUMBNESS AND TINGLING OF RIGHT ARM: ICD-10-CM

## 2024-04-10 DIAGNOSIS — M17.12 PRIMARY OSTEOARTHRITIS OF LEFT KNEE: ICD-10-CM

## 2024-04-10 DIAGNOSIS — G89.29 CHRONIC PAIN OF LEFT KNEE: ICD-10-CM

## 2024-04-10 PROCEDURE — 99214 OFFICE O/P EST MOD 30 MIN: CPT | Performed by: STUDENT IN AN ORGANIZED HEALTH CARE EDUCATION/TRAINING PROGRAM

## 2024-04-10 PROCEDURE — 3077F SYST BP >= 140 MM HG: CPT | Performed by: STUDENT IN AN ORGANIZED HEALTH CARE EDUCATION/TRAINING PROGRAM

## 2024-04-10 PROCEDURE — 1125F AMNT PAIN NOTED PAIN PRSNT: CPT | Performed by: STUDENT IN AN ORGANIZED HEALTH CARE EDUCATION/TRAINING PROGRAM

## 2024-04-10 PROCEDURE — 3078F DIAST BP <80 MM HG: CPT | Performed by: STUDENT IN AN ORGANIZED HEALTH CARE EDUCATION/TRAINING PROGRAM

## 2024-04-10 ASSESSMENT — PATIENT HEALTH QUESTIONNAIRE - PHQ9: CLINICAL INTERPRETATION OF PHQ2 SCORE: 2

## 2024-04-10 ASSESSMENT — PAIN SCALES - GENERAL: PAINLEVEL: 5=MODERATE PAIN

## 2024-04-10 NOTE — PROGRESS NOTES
Follow-up patient Note    Interventional Pain and Spine  Physiatry (Physical Medicine and Rehabilitation)     Patient Name: Andrei Galeas  : 1945  Date of service: 4/10/2024    Chief Complaint:   Chief Complaint   Patient presents with    Follow-Up     Left lumbar radiculitis       HISTORY  Please see new patient note by Dr. Cervantes for more details.     HPI  Today's visit   Andrei Galeas ( 1945) is a RHD male with Diagnoses of Numbness and tingling in left arm, Lumbar spondylosis, Left lumbar radiculitis, Chronic pain of left knee, Primary osteoarthritis of left knee, Cervicalgia, Numbness and tingling of right arm, Chronic midline low back pain without sciatica, Right wrist pain, and Myalgia were pertinent to this visit.    Presents today for review of MRI lumbar spine 2024.  He reports his pain is somewhat improved since his last visit.  He continues to have pain at the low back and left upper glute area.  It is better with leaning forward and worse with standing up straight.  Pain interferes with his ability to stand or walk for prolonged time.  He denies numbness, tingling, radiating pain, or heaviness in the legs.    He reports ongoing numbness in bilateral hands.  He denies weakness or radiating pain down the arms.  After discussion today, he would like to avoid EMG or cervical MRI for this at this time.    He reports lingering right wrist pain after a fall on 24 when he tripped over a curb he did not see while carrying a box.  X-rays were negative for acute injury.  Now it feels more like a sprain.     Alternates taking tylenol or advil PRN for pain QHS with improvement in sleep. Has taken gabapentin 100-200mg QHS with no noticeable relief and no known unwanted SE.     Procedure history:  - 22 Lumbar Epidural Steroid Injection at the left L5-S1 level - 100% pain relief x 1 month, then started wearing off 1 week ago, now 20-30% better.  Denies radiating pain in  legs at follow-up on 3/17/2023.  - 2/13/23 left knee steroid injection - 3 days of significant relief  - 3/24/23 left knee steroid injection - 3 days of significant relief  - 05/05/23 Left knee Synvisc One injection - 82% pain relief  - 5/16/2023 diagnostic medial branch blocks targeting the BILATERAL L4-5 and L5-S1 facet joints with fluoroscopic guidance #1 - 80% improvement in pain, improvement in ability to do ADLs like standing, lying down, turning, transitioning between sit and stand.   - 6/1/23 diagnostic lumbar medial branch blocks targeting the bilateral L4-L5 and L5-S1 facet joints #2 - 80% improvement in pain and improvement in sitting for a prolonged time, standing, lying down  - 7/6/23 Radiofrequency ablation of medial branches targeting Bilateral L4-L5 and L5-S1 facet joints - 100% relief of index pain at his bilateral lumbar facet joints.      ROS:   Red Flags ROS:   Fever, Chills, Sweats: Denies  Involuntary Weight Loss: Denies  Bladder Incontinence: Denies  Bowel Incontinence: denies  Saddle Anesthesia: Denies    All other systems reviewed and negative.     PMHx:   Past Medical History:   Diagnosis Date    Arthritis     Back pain     Cataract     Depression     Diabetes (HCC)     borderline    Hypertension     Obstructive chronic bronchitis without exacerbation (HCC) 8/5/2022    Shortness of breath     Sinusitis     Sleep apnea     Wheezing        PSHx:   Past Surgical History:   Procedure Laterality Date    HI DSTR NROLYTC AGNT PARVERTEB FCT SNGL LMBR/SACRAL Bilateral 7/6/2023    Procedure: RIGHT and LEFT radiofrequency neurotomies medial branch targeting the L4-5 and L5-S1 facet joints with fluoroscopic guidance and sedation, Plan for 80 °C for 90 seconds for each neurotomy;  Surgeon: Claudia Cervantes M.D.;  [facility];  Service: Pain Management    HI INJ DX/THER AGNT PARAVERT FACET JOINT, CELESTE* Bilateral 6/1/2023    Procedure: Diagnostic medial branch blocks targeting the  BILATERAL L4-5 and L5-S1 facet joints with fluoroscopic guidance #2;  Surgeon: Claudia Cervantes M.D.;  Location: [facility];  Service: Pain Management    INJ,ANES LUMBAR/CERVICAL Bilateral 2023    Procedure: Diagnostic medial branch blocks targeting the BILATERAL L4-5 and L5-S1 facet joints with fluoroscopic guidance #1;  Surgeon: Claudia Cervantes M.D.;  Location: [facility];  Service: Pain Management    [place] INJ LUMBAR/SACRAL,W/ IMAGING Left 2022    Procedure: LEFT lumbar L5-S1 interlaminar epidural steroid injection.;  Surgeon: Claudia Cervantes M.D.;  Location: [facility];  Service: Pain Management    SINUSOTOMIES         Family Hx:   Family History   Problem Relation Age of Onset    Heart Disease Paternal Uncle         MI    Arthritis Mother     Psychiatric Illness Father         stomach cancer    Hypertension Father     Hyperlipidemia Father     Stroke Father        Social Hx:  Social History     Socioeconomic History    Marital status:      Spouse name: Not on file    Number of children: Not on file    Years of education: Not on file    Highest education level: Not on file   Occupational History    Not on file   Tobacco Use    Smoking status: Former     Current packs/day: 0.00     Average packs/day: 2.0 packs/day for 10.0 years (20.0 ttl pk-yrs)     Types: Cigarettes     Start date: 1967     Quit date: 1977     Years since quittin.6    Smokeless tobacco: Never   Vaping Use    Vaping Use: Never used   Substance and Sexual Activity    Alcohol use: Yes     Comment: 1-2 drinks every week or two    Drug use: No    Sexual activity: Not on file   Other Topics Concern    Not on file   Social History Narrative    Not on file     Social Determinants of Health     Financial Resource Strain: Not on file   Food Insecurity: No Food Insecurity (2022)    Hunger Vital Sign     Worried About Running Out of Food in the Last Year: Never true      Ran Out of Food in the Last Year: Never true   Transportation Needs: No Transportation Needs (8/4/2022)    PRAPARE - Transportation     Lack of Transportation (Medical): No     Lack of Transportation (Non-Medical): No   Physical Activity: Inactive (8/4/2022)    Exercise Vital Sign     Days of Exercise per Week: 0 days     Minutes of Exercise per Session: 0 min   Stress: Stress Concern Present (8/4/2022)    [employer] [place] [employer] - Occupational Stress Questionnaire     Feeling of Stress : To some extent   Social Connections: Socially Integrated (8/4/2022)    Social Connection and Isolation Panel [NHANES]     Frequency of Communication with Friends and Family: Three times a week     Frequency of Social Gatherings with Friends and Family: Once a week     Attends Yazidism Services: More than 4 times per year     Active Member of Clubs or Organizations: Yes     Attends Club or Organization Meetings: More than 4 times per year     Marital Status:    Intimate Partner Violence: Not on file   Housing Stability: Low Risk  (8/4/2022)    Housing Stability Vital Sign     Unable to Pay for Housing in the Last Year: No     Number of Places Lived in the Last Year: 1     Unstable Housing in the Last Year: No       Allergies:  Allergies   Allergen Reactions    Seasonal     Penicillins Unspecified     Childhood - unknown reaction       Medications: reviewed on epic.   Outpatient Medications Marked as Taking for the 4/10/24 encounter (Office Visit) with Claudia Cervantes M.D.   Medication Sig Dispense Refill    traZODone (DESYREL) 100 MG Tab Take 1 Tablet by mouth every evening. 30 Tablet 3    rosuvastatin (CRESTOR) 10 MG Tab Take 1 Tablet by mouth every day. 100 Tablet 3    tamsulosin (FLOMAX) 0.4 MG capsule TAKE 2 CAPSULES BY MOUTH ONCE DAILY AS DIRECTED AFTER A MEAL IN THE EVENING 180 Capsule 3    finasteride (PROSCAR) 5 MG Tab Take 1 Tablet by mouth every day. 90 Tablet 3    losartan-hydrochlorothiazide  (HYZAAR) 100-25 MG per tablet Take 1 Tablet by mouth every day. 100 Tablet 3    diphenhydrAMINE-APAP, sleep, (TYLENOL PM EXTRA STRENGTH)  MG Tab Take 1 Tablet by mouth at bedtime as needed.      fexofenadine (ALLEGRA) 60 MG Tab Take 60 mg by mouth every day.      multivitamin (THERAGRAN) Tab Take 1 Tab by mouth every day.          Current Outpatient Medications on File Prior to Visit   Medication Sig Dispense Refill    traZODone (DESYREL) 100 MG Tab Take 1 Tablet by mouth every evening. 30 Tablet 3    rosuvastatin (CRESTOR) 10 MG Tab Take 1 Tablet by mouth every day. 100 Tablet 3    tamsulosin (FLOMAX) 0.4 MG capsule TAKE 2 CAPSULES BY MOUTH ONCE DAILY AS DIRECTED AFTER A MEAL IN THE EVENING 180 Capsule 3    finasteride (PROSCAR) 5 MG Tab Take 1 Tablet by mouth every day. 90 Tablet 3    losartan-hydrochlorothiazide (HYZAAR) 100-25 MG per tablet Take 1 Tablet by mouth every day. 100 Tablet 3    diphenhydrAMINE-APAP, sleep, (TYLENOL PM EXTRA STRENGTH)  MG Tab Take 1 Tablet by mouth at bedtime as needed.      fexofenadine (ALLEGRA) 60 MG Tab Take 60 mg by mouth every day.      multivitamin (THERAGRAN) Tab Take 1 Tab by mouth every day.      gabapentin (NEURONTIN) 100 MG Cap Take 1-2 tabs po at bedtime as needed for pain (Patient not taking: Reported on 4/10/2024) 60 Capsule 1    ketoconazole (NIZORAL) 2 % Cream ketoconazole 2 % topical cream   APPLY CREAM TOPICALLY TO AFFECTED AREA ON THE SKIN TWICE DAILY (Patient not taking: Reported on 3/4/2024)      fluticasone (FLONASE) 50 MCG/ACT nasal spray Spray 1 Spray in nose every day. (Patient not taking: Reported on 4/10/2024)       No current facility-administered medications on file prior to visit.         EXAMINATION     Physical Exam:   BP (!) 152/64 (BP Location: Left arm, Patient Position: Sitting, BP Cuff Size: Adult)   Pulse 82   Temp 36.8 °C (98.2 °F) (Temporal)   Wt (!) 129 kg (284 lb 6.3 oz)   SpO2 93%     Constitutional:   Body Habitus: Body mass  index is 39.66 kg/m².  Cooperation: Fully cooperates with exam  Appearance: Well-groomed, well-nourished.    Eyes: No scleral icterus to suggest severe liver disease, no proptosis to suggest severe hyperthyroidism    ENT -no obvious auditory deficits, no noticeable facial droop     Skin -no rashes or lesions noted     Respiratory-  breathing comfortably on room air, no audible wheezing    Cardiovascular-distal extremities warm and well perfused.  No lower extremity edema is noted.     Gastrointestinal - no obvious abdominal masses, non-distended    Psychiatric- alert and oriented ×3. Normal affect.     Gait - normal gait, no use of ambulatory device, nonantalgic.     Musculoskeletal and Neuro -       Thoracic/Lumbar Spine/Sacral Spine/Hips     Facet loading maneuver negative bilaterally     Palpation:   Tenderness to palpation over the left thoracolumbar fascia and left glute.  No tenderness to palpation elsewhere in the low back/hips including midline lumbar spine, lumbar facets bilaterally, paraspinal muscles bilaterally, sacroiliac joints bilaterally, PSIS bilaterally, and greater trochanters bilaterally.    Lumbar spine /hip provocative exam maneuvers  Straight leg raise negative bilaterally  FADIR test negative bilaterally      SI joint tests  ASYA test negative bilaterally  Thigh thrust test negative bilaterally  Sacral compression, Gaenslen's negative bilaterally    Inspection: No evidence of atrophy in bilateral lower extremities throughout     Key points for the international standards for neurological classification of spinal cord injury (ISNCSCI) to light touch.   Dermatome R L   L2 2 2   L3 2 2   L4 2 2   L5 2 2   S1 2 2   S2 2 2         Motor Exam Lower Extremities  ? Myotome R L   Hip flexion L2 5 5   Knee extension L3 5 5   Ankle dorsiflexion L4 5 5   Toe extension L5 5 5   Ankle plantarflexion S1 5 5        Previous exam  Focal tenderness to palpation at left knee medial joint line, just inferior to  left knee medial joint line, and supra patellar facet.      Full active range of motion with knee flexion and extension.  No tenderness to palpation elsewhere in left knee.          Cervical spine   Inspection: No deformities of the skin over the cervical spine. No rashes or lesions.    limited active range of motion in all directions    Spurling's sign  negative bilaterally  Cervical facet loading maneuver  negative bilaterally    No signs of muscular atrophy in bilateral upper extremities     Tenderness to palpation at paracervical muscles bilaterally, cervical facets bilaterally, and upper trapezius bilaterally. No tenderness to palpation elsewhere including midline of cervical spine.      Key points for the international standards for neurological classification of spinal cord injury (ISNCSCI) to light touch.     Dermatome R L   C4 2 2   C5 2 2   C6 1 1   C7 1 1   C8 1 1   T1 2 2   T2 2 2       Motor Exam Upper Extremities   ? Myotome R L   Shoulder abduction C5 5 5   Elbow flexion C5 5 5   Wrist extension C6 5 5   Elbow extension C7 5 5   Finger flexion C8 5 5   Finger abduction T1 5 5     Reflexes  ?  R L   Biceps  1+ 1+   Brachioradialis  1+ 1+     March's sign negative bilaterally     Bilateral hands:   Inspection: No swelling, deformities, or rashes. Symmetric appearing thenar and hyperthenar regions bilaterally.  Palpation: no significant tenderness to palpation throughout the bilateral hands  Range of motion is within normal limits throughout bilateral hands, fingers and wrist.    Special tests:  Tinel's at the wrist over the median nerve positive on left, negative on right  Carpal tunnel compression: negative bilaterally  Phalen's test: positive on left, negative on right  Tinel's at the cubital tunnel: negative bilaterally      MEDICAL DECISION MAKING    Medical records review: see under HPI section.     DATA    Labs: No new labs available for review since last visit.   Lab Results   Component Value  "Date/Time    SODIUM 136 12/08/2022 10:57 AM    POTASSIUM 4.0 12/08/2022 10:57 AM    CHLORIDE 98 12/08/2022 10:57 AM    CO2 24 12/08/2022 10:57 AM    ANION 14.0 12/08/2022 10:57 AM    GLUCOSE 120 (H) 12/08/2022 10:57 AM    BUN 16 12/08/2022 10:57 AM    CREATININE 0.87 12/08/2022 10:57 AM    CALCIUM 10.0 12/08/2022 10:57 AM    ASTSGOT 56 (H) 12/08/2022 10:57 AM    ALTSGPT 80 (H) 12/08/2022 10:57 AM    TBILIRUBIN 0.6 12/08/2022 10:57 AM    ALBUMIN 4.6 12/08/2022 10:57 AM    TOTPROTEIN 7.3 12/08/2022 10:57 AM    GLOBULIN 2.7 12/08/2022 10:57 AM    AGRATIO 1.7 12/08/2022 10:57 AM       No results found for: \"PROTHROMBTM\", \"INR\"     Lab Results   Component Value Date/Time    WBC 8.9 08/14/2016 10:50 AM    RBC 5.13 08/14/2016 10:50 AM    HEMOGLOBIN 15.7 08/14/2016 10:50 AM    HEMATOCRIT 45.6 08/14/2016 10:50 AM    MCV 88.9 08/14/2016 10:50 AM    MCH 30.5 08/14/2016 10:50 AM    MCHC 34.3 08/14/2016 10:50 AM    MPV 7.9 08/14/2016 10:50 AM    NEUTSPOLYS 54.6 12/14/2012 08:52 AM    LYMPHOCYTES 32.8 12/14/2012 08:52 AM    MONOCYTES 10.0 (H) 12/14/2012 08:52 AM    EOSINOPHILS 2.3 12/14/2012 08:52 AM    BASOPHILS 0.3 12/14/2012 08:52 AM        Lab Results   Component Value Date/Time    HBA1C 6.2 (H) 08/05/2022 10:03 AM        Imaging:   I personally reviewed following images, these are my reads  MRI lumbar spine 11/11/22  Disc bulge most notable at L2-3, L3-4, and L4-5. Severe left  and mild right neuroforaminal stenosis at L4-5. Mild right neuroforaminal stenosis at L5-S1. Moderate bilateral lateral recess narrowing narrowing and moderate right foraminal narrowing at L3-4. See formal radiology report for further details.    X-ray left knee 11/26/2022  Patella fermin.  Quadriceps tendon enthesophyte.  Mild osteoarthritis at medial compartment.     XR left knee 1/7/2023  Mild tricompartmental osteoarthritis.  Appears slightly worse at medial compartment.  Alignment intact. See formal radiology report for further details.    MRI " cervical spine 5/11/2023  Mild central canal stenosis at C5-6.  Moderate central canal stenosis at C4-5 with severe right and moderate left neuroforaminal stenosis at this level.  Moderate right neuroforaminal stenosis at C7-T1.  See formal radiology report for further details.    X-ray cervical spine 5/5/2023  Facet arthropathy of multiple levels. See formal radiology report for further details.    MRI lumbar spine 4/4/2024  At L2-3 there is mild to moderate central canal stenosis.  At L3-4 there is moderate central canal stenosis and moderate to severe right neuroforaminal stenosis.  At L4-5 there is moderate to severe left neuroforaminal stenosis.  At L5-S1 there is moderate left neuroforaminal stenosis.  There is facet arthropathy at multiple lumbar levels. See formal radiology report for further details.      IMAGING radiology reads. I reviewed the following radiology reads   MRI lumbar spine 4/4/2024  FINDINGS:  The lumbar spine maintains normal height and alignment. There is no fracture or dislocation. There is no pathologic marrow infiltration. There is no focal aggressive osseous lesion. There is no perivertebral, disc or epidural fluid collection.     At the level of L5-S1,there is disc degeneration. Mild facet joint arthropathy is seen. Marginal osteophytes are seen. There is mild-to-moderate LEFT neural foraminal stenosis. There is no central canal stenosis.     At the level of L4-5,there is disc degeneration. Degenerative marrow signal changes are noted in the adjacent endplates. Mild diffuse disc bulge is seen. Bilateral facet joint arthropathy is seen. There is moderate to severe LEFT neural foraminal   stenosis. There is no central canal stenosis.     At the level of L3-4,there is disc degeneration. Degenerative marrow signal changes are noted in the adjacent endplates. There is diffuse disc bulge with small central disc protrusion. Bilateral facet joint arthropathy is seen. There is prominent    epidural fat. This findings are causing moderate central canal stenosis. There is moderate to severe RIGHT neural foraminal stenosis.     At the level of the L2-3,there is disc degeneration. There is diffuse disc bulge. Bilateral facet joint arthropathy is seen. There is prominent epidural fat. There is mild-to-moderate central canal stenosis.     At the level of L1-2,there is disc degeneration. There is diffuse disc bulge. There is no spinal or neural foraminal stenosis.     The conus terminates at the level of L1. The visualized lower thoracic spinal cord is unremarkable. There is no lumbar intradural lesion.     There is an approximately 3.8 cm sized mixed signal intensity lesion in the RIGHT renal parenchyma. There is also a partially visualized simple cyst in the RIGHT kidney.     IMPRESSION:     1.  Multifocal degenerative disease in the lumbar spine as described above.  2.  Moderate central canal stenosis at L3-4.  3.  Mild-to-moderate central canal stenosis at L2-3.  4.  Moderate to severe LEFT L4 and RIGHT L3 neural foraminal stenosis.  5.  Stable an approximately 3.8 cm sized mixed signal intensity lesion in the RIGHT internal parenchyma. Please see dedicated CT scan of the abdomen dated 11/26/2022 for further clarification.  6.  There has been no significant interval change.      X-ray cervical spine 5/5/2023  FINDINGS:  The cervical vertebral bodies are normal in appearance and alignment is normal.  There is multilevel decreased disc height and endplate spurring. There is multilevel facet degeneration most prominently seen at C4-5, C5-6 and C6-7.. There is minimal anterior subluxation of C2-3 and C3-4 with mild retrolisthesis at C4-5.  There is left-sided carotid arterial atherosclerotic plaque. There is some dorsal soft tissue ossification.     IMPRESSION:     1.  Multilevel degenerative disc disease and facet degeneration.     2.  Mild multilevel degenerative subluxation.    MRI cervical spine  5/11/2023  FINDINGS:  Cervical spine alignment is within normal limits. Vertebral body heights are preserved. Bone marrow signal intensity is within normal limits.  The craniocervical junction is normal in appearance. The included portion of the posterior fossa is within normal limits.        Axial cervical spine levels:     C2-3: Endplate disc osteophyte complex mildly encroaches upon the spinal canal. There is no significant canal stenosis or foraminal narrowing.     C3-4: Endplate disc osteophyte complex with bilateral facet degenerative change slightly worse on the left side. There is no significant canal narrowing. There is no significant foraminal narrowing.     C4-5: Endplate disc osteophyte complex with bilateral uncovertebral degeneration and facet degeneration slightly worse on the right side. There is severe right and moderate left foraminal narrowing. There is also moderate canal narrowing, asymmetrically   greater along the left paracentral aspect with slight cord deformity.     C5-C6: Endplate disc osteophyte complex with bilateral uncovertebral degenerative change. There is mild right foraminal narrowing and mild canal narrowing.     C6-7: Endplate disc osteophyte complex and bilateral uncovertebral degeneration, slightly greater on the right side. There is mild right foraminal narrowing without significant canal stenosis.     C7-T1: Endplate disc osteophyte complex with right-sided uncovertebral degeneration noted. There is moderate right foraminal narrowing.     The prevertebral soft tissues are within normal limits.        IMPRESSION:        Moderate canal stenosis at C4-5 with cord impingement. There is also severe right and moderate left foraminal narrowing at this level.     There is mild canal narrowing at C5-6.     Moderate right foraminal narrowing also noted at C7-T1.     No definite spinal cord signal abnormality is identified.    X-ray left knee 1/7/2023  FINDINGS:  Bone density is normal.  There is no evidence of fracture or dislocation. There is tricompartment osteoarthritis characterized by osteophytic spurring. There is a small joint effusion.     IMPRESSION:     Mild tricompartment osteoarthritis.                         Results for orders placed in visit on 22     MR-LUMBAR SPINE-W/O     Impression  Mild lumbar spine degenerative changes. Type I marrow changes are noted to the adjacent L3-L4 endplates.     There is moderate right-sided foraminal narrowing at the L3-4 level with impingement upon the exiting right L3 nerve     Severe left foraminal narrowing at L4-5 with L4 nerve impingement.     Partially visualized is a mass involving the right renal upper pole. Recommend additional evaluation with a CT of the abdomen and pelvis with contrast, renal protocol.     These unexpected findings were communicated to the ordering provider by Epic inbasket message at 5:14 PM on 2022.               X-ray left knee 2022  FINDINGS:  No acute fracture or dislocation.     Mild osteoarthritis     Small knee joint effusion.     IMPRESSION:        1. No acute osseous abnormality.    Diagnosis  Visit Diagnoses     ICD-10-CM   1. Numbness and tingling in left arm  R20.0    R20.2   2. Lumbar spondylosis  M47.816   3. Left lumbar radiculitis  M54.16   4. Chronic pain of left knee  M25.562    G89.29   5. Primary osteoarthritis of left knee  M17.12   6. Cervicalgia  M54.2   7. Numbness and tingling of right arm  R20.0    R20.2   8. Chronic midline low back pain without sciatica  M54.50    G89.29   9. Right wrist pain  M25.531   10. Myalgia  M79.10               ASSESSMENT AND PLAN:  Andrei Galeas (: 1945) is a male with left low back and left knee and medial calf pain that appears to have component of left lumbar radiculitis from severe left neuroforaminal stenosis at L4-5, with ongoing resolution of radiating calf pain after epidural.  Now with significant left knee pain that  appears to be mediated by osteoarthritis.    No longer having pain radiating down his arm.     Clyde was seen today for follow-up.    Diagnoses and all orders for this visit:    Numbness and tingling in left arm    Lumbar spondylosis    Left lumbar radiculitis    Chronic pain of left knee    Primary osteoarthritis of left knee    Cervicalgia    Numbness and tingling of right arm    Chronic midline low back pain without sciatica    Right wrist pain  -     Referral to Orthopedics    Myalgia              PLAN  Physical Therapy:  I discussed my recommendation that the patient pursue physical therapy to maximize the likelihood of long-term pain relief for both his knee and back.  I gave him a handout of physical therapy exercises for low back pain and neck pain at a previous visit.  He declined formal physical therapy referral     Diagnostic workup:   Personally reviewed at today's visit:  MRI lumbar spine 4/4/2024    Medications:   - continue OTC tylenol, OTC ibuprofen. Discussed that  I would like him to follow-up with his PCP regarding elevated liver enzymes before possibly increasing Tylenol dose further  -I have discussed that he may take OTC topical analgesics which previously improved his pain  -I have discussed that he could consider continuing gabapentin per PCP discretion.  He does not feel that it is helping.    Interventions:   -Radiofrequency ablation of medial branches targeting Bilateral L4-L5 and L5-S1 facet joints PRN given 100% relief of index pain at his bilateral lumbar facet joints after this procedure.  - interlaminar left L5-S1 epidural steroid injection PRN if radiating pain returns  -Discussed possible trigger point injections to target myalgia type pain.  Discussed that the effect of these injections could be limited without stretching and strengthening exercises for his back.  He elected to defer these injections at this time.  -s/p synvisc One injection for left knee under ultrasound guidance  with improvement in left knee pain    Referral   -Referral to Ortho hand surgery for further evaluation and management of right wrist pain after fall    Other:  - Discussed possible cervical MRI or EMG for further evaluation and management of numbness in his hands.  Discussed that the etiology of this could be a nerve injury or vascular.  He declined this at this time    Follow-up: After MRI    Orders Placed This Encounter    Referral to Orthopedics       Claudia Cervantes MD  Interventional Pain and Spine  Physical Medicine and Rehabilitation  [facility]      The above note documents my personal evaluation of this patient. In addition, I have reviewed and confirmed with the patient and MA the supportive information documented in today's Patient Health Questionnaire and Office Note.     Please note that this dictation was created using voice recognition software. I have made every reasonable attempt to correct obvious errors, but I expect that there are errors of grammar and possibly content that I did not discover before finalizing the note.

## 2024-04-16 ENCOUNTER — APPOINTMENT (RX ONLY)
Dept: URBAN - METROPOLITAN AREA CLINIC 15 | Facility: CLINIC | Age: 79
Setting detail: DERMATOLOGY
End: 2024-04-16

## 2024-04-16 DIAGNOSIS — L57.0 ACTINIC KERATOSIS: ICD-10-CM

## 2024-04-16 PROCEDURE — ? PDT: BLUE

## 2024-04-16 PROCEDURE — 96567 PDT DSTR PRMLG LES SKN: CPT

## 2024-04-16 PROCEDURE — ? PHOTODYNAMIC THERAPY COUNSELING

## 2024-04-16 PROCEDURE — ? PHOTO-DOCUMENTATION

## 2024-04-16 ASSESSMENT — LOCATION DETAILED DESCRIPTION DERM
LOCATION DETAILED: RIGHT PROXIMAL DORSAL FOREARM
LOCATION DETAILED: LEFT PROXIMAL DORSAL FOREARM

## 2024-04-16 ASSESSMENT — LOCATION SIMPLE DESCRIPTION DERM
LOCATION SIMPLE: RIGHT FOREARM
LOCATION SIMPLE: LEFT FOREARM

## 2024-04-16 ASSESSMENT — LOCATION ZONE DERM: LOCATION ZONE: ARM

## 2024-04-16 NOTE — PROCEDURE: PDT: BLUE
Which Photosensitizer Was Used: Levulan
Light Source: Harish-U
Show Medical Necessity In Plan?: Yes
Pre-Procedure Text: The treatment areas were cleaned and prepped in the usual fashion.
Number Of Kerasticks/Tubes Billed For: 2
Post-Care Instructions: I reviewed with the patient in detail post-care instructions. Patient is to avoid sunlight for the next 2 days, and wear sun protection. Patients may expect sunburn like redness, discomfort and scabbing.
Incubation Time: 02:00:00
Illumination Time: 00:16:40
Consent: Written consent obtained.  The risks were reviewed with the patient including but not limited to: pigmentary changes, pain, blistering, scabbing, redness, and the remote possibility of scarring.
Expiration Date (Optional): 12/2026
History Of Hsv?: No
Treatment Number: 0
Lot # (Optional): [id number]
Anesthesia Type: 1% lidocaine with epinephrine
Detail Level: Zone
Who Performed The Pdt?: Performed by Nurse, MA or  with Pre-Procedure Debridement of Hyperkeratotic Lesions (91115)
Medical Necessity: Precancerous Lesions

## 2024-05-06 NOTE — PROCEDURE: OBSERVATION
Body Location Override (Optional - Billing Will Still Be Based On Selected Body Map Location If Applicable): left postauricular
X Size Of Lesion In Cm (Optional): 0
Detail Level: Detailed
Body Location Override (Optional - Billing Will Still Be Based On Selected Body Map Location If Applicable): right forehead
10-May-2024

## 2024-05-13 ENCOUNTER — APPOINTMENT (RX ONLY)
Dept: URBAN - METROPOLITAN AREA CLINIC 35 | Facility: CLINIC | Age: 79
Setting detail: DERMATOLOGY
End: 2024-05-13

## 2024-05-13 DIAGNOSIS — L92.0 GRANULOMA ANNULARE: ICD-10-CM

## 2024-05-13 PROCEDURE — ? COUNSELING

## 2024-05-13 PROCEDURE — 11901 INJECT SKIN LESIONS >7: CPT

## 2024-05-13 PROCEDURE — ? INTRALESIONAL KENALOG

## 2024-05-13 PROCEDURE — ? ADDITIONAL NOTES

## 2024-05-13 ASSESSMENT — LOCATION DETAILED DESCRIPTION DERM
LOCATION DETAILED: RIGHT DISTAL DORSAL FOREARM
LOCATION DETAILED: LEFT MID-UPPER BACK
LOCATION DETAILED: RIGHT RADIAL DORSAL HAND
LOCATION DETAILED: RIGHT ANTECUBITAL SKIN
LOCATION DETAILED: RIGHT PROXIMAL PRETIBIAL REGION
LOCATION DETAILED: RIGHT VENTRAL LATERAL PROXIMAL FOREARM
LOCATION DETAILED: LEFT DISTAL POSTERIOR UPPER ARM
LOCATION DETAILED: LEFT DISTAL DORSAL FOREARM
LOCATION DETAILED: RIGHT ULNAR DORSAL HAND
LOCATION DETAILED: RIGHT PROXIMAL POSTERIOR UPPER ARM
LOCATION DETAILED: RIGHT LATERAL ANTECUBITAL SKIN
LOCATION DETAILED: LEFT PROXIMAL PRETIBIAL REGION
LOCATION DETAILED: RIGHT ANTERIOR PROXIMAL THIGH
LOCATION DETAILED: LEFT ANTERIOR PROXIMAL THIGH
LOCATION DETAILED: RIGHT MID-UPPER BACK
LOCATION DETAILED: RIGHT VENTRAL PROXIMAL FOREARM

## 2024-05-13 ASSESSMENT — LOCATION SIMPLE DESCRIPTION DERM
LOCATION SIMPLE: LEFT FOREARM
LOCATION SIMPLE: LEFT UPPER ARM
LOCATION SIMPLE: RIGHT HAND
LOCATION SIMPLE: LEFT UPPER BACK
LOCATION SIMPLE: RIGHT FOREARM
LOCATION SIMPLE: LEFT THIGH
LOCATION SIMPLE: RIGHT THIGH
LOCATION SIMPLE: RIGHT UPPER ARM
LOCATION SIMPLE: RIGHT PRETIBIAL REGION
LOCATION SIMPLE: RIGHT ELBOW
LOCATION SIMPLE: LEFT PRETIBIAL REGION
LOCATION SIMPLE: RIGHT UPPER BACK

## 2024-05-13 ASSESSMENT — LOCATION ZONE DERM
LOCATION ZONE: LEG
LOCATION ZONE: ARM
LOCATION ZONE: HAND
LOCATION ZONE: TRUNK

## 2024-05-13 NOTE — PROCEDURE: ADDITIONAL NOTES
Render Risk Assessment In Note?: no
Detail Level: Simple
Additional Notes: Biopsy proven.  [id number] on 02/08/2024.

## 2024-05-13 NOTE — PROCEDURE: INTRALESIONAL KENALOG
Kenalog Preparation: Kenalog with normal saline
Consent: The risks of atrophy were reviewed with the patient.
Which Kenalog Vial Was Used?: Kenalog 10 mg/ml (5 ml vial)
Lot # For Kenalog (Optional): [id number]
How Many Mls Were Removed From The 10 Mg/Ml (5ml) Vial When Preparing The Injectable Solution?: 0.5
Concentration Of Kenalog Solution Injected (Mg/Ml): 2.5
How Many Mls Were Removed From The 80 Mg/Ml (5ml) Vial When Preparing The Injectable Solution?: 0
Administered By (Optional): Dr. Olga Child
Bill For Wasted Drug (Kenalog)?: no
Kenalog Type Of Vial: Multiple Dose
Total Volume (Ccs): 1
Medical Necessity Clause: This procedure was medically necessary because the lesions that were treated were:
Validate Note Data When Using Inventory: Yes
Expiration Date For Kenalog (Optional): 11/2025
Detail Level: Detailed

## 2024-06-05 ENCOUNTER — HOSPITAL ENCOUNTER (OUTPATIENT)
Dept: RADIOLOGY | Facility: MEDICAL CENTER | Age: 79
End: 2024-06-05
Attending: FAMILY MEDICINE
Payer: MEDICARE

## 2024-06-05 ENCOUNTER — OFFICE VISIT (OUTPATIENT)
Dept: MEDICAL GROUP | Facility: LAB | Age: 79
End: 2024-06-05
Payer: MEDICARE

## 2024-06-05 VITALS
TEMPERATURE: 97.9 F | OXYGEN SATURATION: 95 % | HEIGHT: 71 IN | BODY MASS INDEX: 39.2 KG/M2 | RESPIRATION RATE: 18 BRPM | DIASTOLIC BLOOD PRESSURE: 62 MMHG | HEART RATE: 76 BPM | SYSTOLIC BLOOD PRESSURE: 116 MMHG | WEIGHT: 280 LBS

## 2024-06-05 DIAGNOSIS — R05.1 ACUTE COUGH: ICD-10-CM

## 2024-06-05 DIAGNOSIS — J06.9 UPPER RESPIRATORY TRACT INFECTION, UNSPECIFIED TYPE: ICD-10-CM

## 2024-06-05 PROCEDURE — 3078F DIAST BP <80 MM HG: CPT | Performed by: FAMILY MEDICINE

## 2024-06-05 PROCEDURE — 71046 X-RAY EXAM CHEST 2 VIEWS: CPT

## 2024-06-05 PROCEDURE — 99214 OFFICE O/P EST MOD 30 MIN: CPT | Performed by: FAMILY MEDICINE

## 2024-06-05 PROCEDURE — 3074F SYST BP LT 130 MM HG: CPT | Performed by: FAMILY MEDICINE

## 2024-06-05 RX ORDER — BENZONATATE 100 MG/1
100 CAPSULE ORAL 3 TIMES DAILY PRN
Qty: 60 CAPSULE | Refills: 0 | Status: SHIPPED | OUTPATIENT
Start: 2024-06-05

## 2024-06-05 SDOH — ECONOMIC STABILITY: FOOD INSECURITY: WITHIN THE PAST 12 MONTHS, THE FOOD YOU BOUGHT JUST DIDN'T LAST AND YOU DIDN'T HAVE MONEY TO GET MORE.: NEVER TRUE

## 2024-06-05 SDOH — ECONOMIC STABILITY: HOUSING INSECURITY
IN THE LAST 12 MONTHS, WAS THERE A TIME WHEN YOU DID NOT HAVE A STEADY PLACE TO SLEEP OR SLEPT IN A SHELTER (INCLUDING NOW)?: YES

## 2024-06-05 SDOH — HEALTH STABILITY: PHYSICAL HEALTH: ON AVERAGE, HOW MANY MINUTES DO YOU ENGAGE IN EXERCISE AT THIS LEVEL?: 0 MIN

## 2024-06-05 SDOH — ECONOMIC STABILITY: INCOME INSECURITY: IN THE LAST 12 MONTHS, WAS THERE A TIME WHEN YOU WERE NOT ABLE TO PAY THE MORTGAGE OR RENT ON TIME?: NO

## 2024-06-05 SDOH — HEALTH STABILITY: PHYSICAL HEALTH: ON AVERAGE, HOW MANY DAYS PER WEEK DO YOU ENGAGE IN MODERATE TO STRENUOUS EXERCISE (LIKE A BRISK WALK)?: 0 DAYS

## 2024-06-05 SDOH — ECONOMIC STABILITY: INCOME INSECURITY: HOW HARD IS IT FOR YOU TO PAY FOR THE VERY BASICS LIKE FOOD, HOUSING, MEDICAL CARE, AND HEATING?: NOT VERY HARD

## 2024-06-05 SDOH — ECONOMIC STABILITY: FOOD INSECURITY: WITHIN THE PAST 12 MONTHS, YOU WORRIED THAT YOUR FOOD WOULD RUN OUT BEFORE YOU GOT MONEY TO BUY MORE.: NEVER TRUE

## 2024-06-05 SDOH — ECONOMIC STABILITY: HOUSING INSECURITY: IN THE LAST 12 MONTHS, HOW MANY PLACES HAVE YOU LIVED?: 1

## 2024-06-05 ASSESSMENT — ENCOUNTER SYMPTOMS
WHEEZING: 0
SPUTUM PRODUCTION: 1
CHILLS: 0
COUGH: 1
SHORTNESS OF BREATH: 1
PALPITATIONS: 0
FEVER: 0

## 2024-06-05 ASSESSMENT — SOCIAL DETERMINANTS OF HEALTH (SDOH)
IN A TYPICAL WEEK, HOW MANY TIMES DO YOU TALK ON THE PHONE WITH FAMILY, FRIENDS, OR NEIGHBORS?: THREE TIMES A WEEK
HOW OFTEN DO YOU HAVE SIX OR MORE DRINKS ON ONE OCCASION: NEVER
HOW OFTEN DO YOU ATTEND CHURCH OR RELIGIOUS SERVICES?: MORE THAN 4 TIMES PER YEAR
HOW HARD IS IT FOR YOU TO PAY FOR THE VERY BASICS LIKE FOOD, HOUSING, MEDICAL CARE, AND HEATING?: NOT VERY HARD
HOW OFTEN DO YOU GET TOGETHER WITH FRIENDS OR RELATIVES?: ONCE A WEEK
HOW OFTEN DO YOU GET TOGETHER WITH FRIENDS OR RELATIVES?: ONCE A WEEK
HOW OFTEN DO YOU HAVE A DRINK CONTAINING ALCOHOL: NEVER
HOW OFTEN DO YOU ATTENT MEETINGS OF THE CLUB OR ORGANIZATION YOU BELONG TO?: MORE THAN 4 TIMES PER YEAR
WITHIN THE PAST 12 MONTHS, YOU WORRIED THAT YOUR FOOD WOULD RUN OUT BEFORE YOU GOT THE MONEY TO BUY MORE: NEVER TRUE
DO YOU BELONG TO ANY CLUBS OR ORGANIZATIONS SUCH AS CHURCH GROUPS UNIONS, FRATERNAL OR ATHLETIC GROUPS, OR SCHOOL GROUPS?: YES
HOW MANY DRINKS CONTAINING ALCOHOL DO YOU HAVE ON A TYPICAL DAY WHEN YOU ARE DRINKING: PATIENT DOES NOT DRINK
HOW OFTEN DO YOU ATTENT MEETINGS OF THE CLUB OR ORGANIZATION YOU BELONG TO?: MORE THAN 4 TIMES PER YEAR
DO YOU BELONG TO ANY CLUBS OR ORGANIZATIONS SUCH AS CHURCH GROUPS UNIONS, FRATERNAL OR ATHLETIC GROUPS, OR SCHOOL GROUPS?: YES
HOW OFTEN DO YOU ATTEND CHURCH OR RELIGIOUS SERVICES?: MORE THAN 4 TIMES PER YEAR
IN A TYPICAL WEEK, HOW MANY TIMES DO YOU TALK ON THE PHONE WITH FAMILY, FRIENDS, OR NEIGHBORS?: THREE TIMES A WEEK

## 2024-06-05 ASSESSMENT — LIFESTYLE VARIABLES
SKIP TO QUESTIONS 9-10: 1
HOW MANY STANDARD DRINKS CONTAINING ALCOHOL DO YOU HAVE ON A TYPICAL DAY: PATIENT DOES NOT DRINK
HOW OFTEN DO YOU HAVE A DRINK CONTAINING ALCOHOL: NEVER
AUDIT-C TOTAL SCORE: 0
HOW OFTEN DO YOU HAVE SIX OR MORE DRINKS ON ONE OCCASION: NEVER

## 2024-06-05 NOTE — PROGRESS NOTES
Verbal consent was acquired by the patient to use [employer] ambient listening note generation during this visit Yes    Subjective:   Andrei Galeas is a 78 y.o. male here today for   Chief Complaint   Patient presents with    Annual Exam    URI     History of Present Illness  The patient is a 78-year-old male here today with concerns regarding a upper respiratory infection symptoms. He is accompanied by an adult female.    The patient presents with phlegm production and a persistent, hacking cough that has been persisting for approximately one month. He also reports dyspnea and difficulty in breathing. Despite the absence of fever, he reports a subjective fever. His appetite is suboptimal. He experienced a brief recovery from a COVID-19 infection two months ago, however, his current symptoms commenced shortly thereafter. He has been self-medicating with cough medicine, Benadryl during the day, and Mucinex. The accompanying adult female is inquiring about the appropriate use of Mucinex. The patient has been utilizing a Neti pot sinus wash nightly, but their uncertainty regarding its efficacy. His medication regimen remains unchanged. He reports significant postnasal drainage. His pulmonologist, Dr. Mahan, prescribed trazodone, which he is not currently taking. The adult female queries whether his symptoms could be residual effects from his COVID-19 infection. He also reports dryness and itching in his eyes.      Allergies   Allergen Reactions    Seasonal     Penicillins Unspecified     Childhood - unknown reaction         Current medicines (including changes today)  Current Outpatient Medications   Medication Sig Dispense Refill    fluticasone (FLONASE) 50 MCG/ACT nasal spray Administer 1 Spray into affected nostril(S) every day.      traZODone (DESYREL) 100 MG Tab Take 1 Tablet by mouth every evening. 30 Tablet 3    rosuvastatin (CRESTOR) 10 MG Tab Take 1 Tablet by mouth every day. 100 Tablet 3     tamsulosin (FLOMAX) 0.4 MG capsule TAKE 2 CAPSULES BY MOUTH ONCE DAILY AS DIRECTED AFTER A MEAL IN THE EVENING 180 Capsule 3    finasteride (PROSCAR) 5 MG Tab Take 1 Tablet by mouth every day. 90 Tablet 3    losartan-hydrochlorothiazide (HYZAAR) 100-25 MG per tablet Take 1 Tablet by mouth every day. 100 Tablet 3    diphenhydrAMINE-APAP, sleep, (TYLENOL PM EXTRA STRENGTH)  MG Tab Take 1 Tablet by mouth at bedtime as needed.      gabapentin (NEURONTIN) 100 MG Cap Take 1-2 tabs po at bedtime as needed for pain (Patient not taking: Reported on 4/10/2024) 60 Capsule 1    ketoconazole (NIZORAL) 2 % Cream ketoconazole 2 % topical cream   APPLY CREAM TOPICALLY TO AFFECTED AREA ON THE SKIN TWICE DAILY (Patient not taking: Reported on 3/4/2024)      fexofenadine (ALLEGRA) 60 MG Tab Take 60 mg by mouth every day.      multivitamin (THERAGRAN) Tab Take 1 Tab by mouth every day.       No current facility-administered medications for this visit.     He  has a past medical history of Arthritis, Back pain, Cataract, Depression, Diabetes ([facility]), Hypertension, Obstructive chronic bronchitis without exacerbation ([facility]) (8/5/2022), Shortness of breath, Sinusitis, Sleep apnea, and Wheezing.    He has no past medical history of Anginal syndrome ([facility]), Arrhythmia, Asthma, Blood clotting disorder ([facility]), CAD (coronary artery disease), Cancer ([facility]), Congestive heart failure ([facility]), Cough, Dialysis patient ([facility]), Dilated cardiomyopathy ([facility]), Disorder of thyroid, Fall, Glaucoma, Gynecological disorder, Heart murmur, Heart valve disease, Hemorrhagic disorder ([facility]), History of - myocardial infarction, Indigestion, Infectious disease, Jaundice, Myocardial infarct ([facility]), Pacemaker, Painful breathing, Pneumonia, Renal disorder, Rheumatic fever, Seizure ([facility]), Sputum production, Stroke ([facility]), or Urinary incontinence.    ROS   Review of Systems   Constitutional:  Negative for chills and fever.   Respiratory:  Positive for cough, sputum production  "and shortness of breath. Negative for wheezing.    Cardiovascular:  Negative for chest pain and palpitations.     -See HPI     Objective:     Physical Exam:  /62   Pulse 76   Temp 36.6 °C (97.9 °F) (Temporal)   Resp 18   Ht 1.803 m (5' 10.98\")   Wt (!) 127 kg (280 lb)   SpO2 95%  Body mass index is 39.07 kg/m².   Constitutional: Alert, no distress.  Skin: Warm, dry, good turgor, no rashes in visible areas.  Eye: Equal, round and reactive, conjunctiva clear, lids normal.  ENMT: TM's clear bilaterally, lips without lesions, good dentition, oropharynx clear.  Respiratory: Unlabored respiratory effort, lungs clear to auscultation, no wheezes, no rhonchi.  Cardiovascular: Normal S1, S2, no murmur, no edema.  Abdomen: Soft, non-tender, no masses, no hepatosplenomegaly.  Psych: Alert and oriented x3, normal affect and mood.    Results      Assessment and Plan:     Assessment & Plan  1. Presenting symptoms indicative of upper respiratory infection.  New acute problem, uncertain prognosis.  The patient has been advised to maintain adequate hydration.  Given the severity of the symptoms and the longevity of symptoms, A chest x-ray has been ordered to exclude the possibility of pneumonia. A prescription for Tessalon Perles, to be taken twice daily, has been issued. The patient has been advised to elevate his head during sleep. The use of Benadryl has been recommended to alleviate allergic symptoms.    Orders:  1. Acute cough  - DX-CHEST-2 VIEWS; Future  - benzonatate (TESSALON) 100 MG Cap; Take 1 Capsule by mouth 3 times a day as needed for Cough.  Dispense: 60 Capsule; Refill: 0    2. Upper respiratory tract infection, unspecified type  - DX-CHEST-2 VIEWS; Future  - benzonatate (TESSALON) 100 MG Cap; Take 1 Capsule by mouth 3 times a day as needed for Cough.  Dispense: 60 Capsule; Refill: 0        Followup: No follow-ups on file.         PLEASE NOTE: This dictation was created using voice recognition and [employer] " [employer] ambient listening software. I have made every reasonable attempt to correct obvious errors, but I expect that there are errors of grammar and possibly content that I did not discover before finalizing the note.

## 2024-06-28 ENCOUNTER — OFFICE VISIT (OUTPATIENT)
Dept: SLEEP MEDICINE | Facility: MEDICAL CENTER | Age: 79
End: 2024-06-28
Attending: NURSE PRACTITIONER
Payer: MEDICARE

## 2024-06-28 VITALS
SYSTOLIC BLOOD PRESSURE: 124 MMHG | WEIGHT: 285 LBS | HEART RATE: 72 BPM | BODY MASS INDEX: 39.9 KG/M2 | DIASTOLIC BLOOD PRESSURE: 58 MMHG | OXYGEN SATURATION: 93 % | RESPIRATION RATE: 16 BRPM | HEIGHT: 71 IN

## 2024-06-28 DIAGNOSIS — G47.33 OSA ON CPAP: ICD-10-CM

## 2024-06-28 PROCEDURE — 99214 OFFICE O/P EST MOD 30 MIN: CPT | Performed by: NURSE PRACTITIONER

## 2024-06-28 PROCEDURE — 3074F SYST BP LT 130 MM HG: CPT | Performed by: NURSE PRACTITIONER

## 2024-06-28 PROCEDURE — 99213 OFFICE O/P EST LOW 20 MIN: CPT | Performed by: NURSE PRACTITIONER

## 2024-06-28 PROCEDURE — 3078F DIAST BP <80 MM HG: CPT | Performed by: NURSE PRACTITIONER

## 2024-06-28 NOTE — PROGRESS NOTES
Hpi Title: Evaluation of a Skin Lesion No chief complaint on file.      HPI:  Andrei Galeas is a 78 y.o. year old male here today for follow-up on KATIA.  Last seen 3/4/2024.  Patient is a previous [facility] patient with studies done approximately 50 to 20 years ago.He has recently moved back to [place] full-time and is establishing care in sleep medicine at [facility].  She was previously treated in [place] at [facility] near [facility].  Previous DME was [employer].  Patient believes that this company is affiliated with [employer]     Patient would like to inquire about receiving new CPAP device.  He wants to set up with new DME company, [employer].  Currently using a nasal pillow with heated tubing.  Patient does state that he is unable to stay asleep at nighttime.  He wakes up frequently.  He also experiences daytime fatigue and does nap.  He uses CPAP whenever sleeping.  Denies any excessive daytime sleepiness, morning headaches, palpitations, concentration or memory problems.  Previously he was trying to use Benadryl to help him stay asleep without finding medication effective.    OPO on CPAP (3/20/2024),  This overnight oximetry was recorded on 3/20/2024 with the patient on CPAP 7 cmH2O.  The analysis duration was 5hrs 57min.  The saturations were less than 90% for 13.4% of the recording.  Basal oxygen saturation of 91.3%. The georgie saturation was 70% (likely artifact).  The patient spent 5.1 minutes with saturations < 88%.  Overall, this continuous nocturnal oximetry demonstrates unremarkable study while on CPAP 7 cmH2O.     Recommendation:  Continue current therapy.  There were disruptions in the night regarding signal which likely led to time at or below 88% saturation.  Majority of the night oxygen saturations maintained above 88% on CPAP.  Clinical correlation is needed.    ROS: As per HPI and otherwise negative if not stated.    Past Medical History:   Diagnosis Date    Arthritis     Back pain     Cataract     Depression     Diabetes  (HCC)     borderline    Hypertension     Obstructive chronic bronchitis without exacerbation (HCC) 8/5/2022    Shortness of breath     Sinusitis     Sleep apnea     Wheezing        Past Surgical History:   Procedure Laterality Date    WY DSTR NROLYTC AGNT PARVERTEB FCT SNGL LMBR/SACRAL Bilateral 7/6/2023    Procedure: RIGHT and LEFT radiofrequency neurotomies medial branch targeting the L4-5 and L5-S1 facet joints with fluoroscopic guidance and sedation, Plan for 80 °C for 90 seconds for each neurotomy;  Surgeon: Claudia Cervantes M.D.;  [facility];  Service: Pain Management    WY INJ DX/THER AGNT PARAVERT FACET JOINT, CELESTE* Bilateral 6/1/2023    Procedure: Diagnostic medial branch blocks targeting the BILATERAL L4-5 and L5-S1 facet joints with fluoroscopic guidance #2;  Surgeon: Claudia Cervantes M.D.;  Location: [facility];  Service: Pain Management    INJ,ANES LUMBAR/CERVICAL Bilateral 5/16/2023    Procedure: Diagnostic medial branch blocks targeting the BILATERAL L4-5 and L5-S1 facet joints with fluoroscopic guidance #1;  Surgeon: Claudia Cervantes M.D.;  Location: [facility];  Service: Pain Management    WY INJ LUMBAR/SACRAL,W/ IMAGING Left 12/20/2022    Procedure: LEFT lumbar L5-S1 interlaminar epidural steroid injection.;  Surgeon: Claudia Cervantes M.D.;  Location: [facility];  Service: Pain Management    SINUSOTOMIES         Family History   Problem Relation Age of Onset    Heart Disease Paternal Uncle         MI    Arthritis Mother     Psychiatric Illness Father         stomach cancer    Hypertension Father     Hyperlipidemia Father     Stroke Father     Cancer Father        Allergies as of 06/28/2024 - Reviewed 06/05/2024   Allergen Reaction Noted    Seasonal  05/05/2023    Penicillins Unspecified 09/13/2013        Vitals:  There were no vitals taken for this visit.    Current medications as of today   Current Outpatient  Medications   Medication Sig Dispense Refill    benzonatate (TESSALON) 100 MG Cap Take 1 Capsule by mouth 3 times a day as needed for Cough. 60 Capsule 0    traZODone (DESYREL) 100 MG Tab Take 1 Tablet by mouth every evening. 30 Tablet 3    rosuvastatin (CRESTOR) 10 MG Tab Take 1 Tablet by mouth every day. 100 Tablet 3    tamsulosin (FLOMAX) 0.4 MG capsule TAKE 2 CAPSULES BY MOUTH ONCE DAILY AS DIRECTED AFTER A MEAL IN THE EVENING 180 Capsule 3    finasteride (PROSCAR) 5 MG Tab Take 1 Tablet by mouth every day. 90 Tablet 3    losartan-hydrochlorothiazide (HYZAAR) 100-25 MG per tablet Take 1 Tablet by mouth every day. 100 Tablet 3    diphenhydrAMINE-APAP, sleep, (TYLENOL PM EXTRA STRENGTH)  MG Tab Take 1 Tablet by mouth at bedtime as needed.      fexofenadine (ALLEGRA) 60 MG Tab Take 60 mg by mouth every day.      multivitamin (THERAGRAN) Tab Take 1 Tab by mouth every day.      fluticasone (FLONASE) 50 MCG/ACT nasal spray Administer 1 Spray into affected nostril(S) every day.       No current facility-administered medications for this visit.         Physical Exam:   Gen:           Alert and oriented, No apparent distress. Mood and affect appropriate, normal interaction with examiner.  Eyes:          PERRL, EOM intact, sclere white, conjunctive moist.  Ears:          Not examined.   Hearing:     Grossly intact.  Nose:          Normal, no lesions or deformities.  Dentition:    Good dentition.  Oropharynx:   Tongue normal, posterior pharynx without erythema or exudate.  Neck:        Supple, trachea midline, no masses.  Respiratory Effort: No intercostal retractions or use of accessory muscles.   Lung Auscultation:      Clear to auscultation bilaterally; no rales, rhonchi or wheezing.  CV:            Regular rate and rhythm. No murmurs, rubs or gallops.  Abd:           Not examined.   Lymphadenopathy: Not examined.  Gait and Station: Normal.  Digits and Nails: No clubbing, cyanosis, petechiae, or nodes.   Cranial  Nerves: II-XII grossly intact.  Skin:        No rashes, lesions or ulcers noted.               Ext:           No cyanosis or edema.      Assessment:  1. KATIA on CPAP          Plan:  ***    Please note that this dictation was created using voice recognition software. I have made every reasonable attempt to correct obvious errors, but it is possible there are errors of grammar and possibly content that I did not discover before finalizing the note.     changes if need be in office. Some machines have a modem and we can access the data wirelessly, if this is the case please make sure the DME company grants us access to your machine.  For all follow up appointments after that, you will only need to bring the SD card to the appointment.    If you are having any issues with the mask, you have a 30 day window to exchange and try something else with your DME company.  If you are having issues with the pressure, please call our office at [phone].  These issues can cause you to not be able to use machine appropriately, there for make you incompliant.    Please call our office if you have any questions.    Thank you, [facility].  [phone]      Please note that this dictation was created using voice recognition software. I have made every reasonable attempt to correct obvious errors, but it is possible there are errors of grammar and possibly content that I did not discover before finalizing the note.

## 2024-07-16 ENCOUNTER — TELEPHONE (OUTPATIENT)
Dept: HEALTH INFORMATION MANAGEMENT | Facility: OTHER | Age: 79
End: 2024-07-16
Payer: MEDICARE

## 2025-01-28 DIAGNOSIS — I10 PRIMARY HYPERTENSION: ICD-10-CM

## 2025-01-29 RX ORDER — LOSARTAN POTASSIUM AND HYDROCHLOROTHIAZIDE 25; 100 MG/1; MG/1
1 TABLET ORAL DAILY
Qty: 100 TABLET | Refills: 2 | Status: SHIPPED | OUTPATIENT
Start: 2025-01-29

## 2025-02-28 DIAGNOSIS — E78.5 HYPERLIPIDEMIA, UNSPECIFIED HYPERLIPIDEMIA TYPE: ICD-10-CM

## 2025-02-28 RX ORDER — ROSUVASTATIN CALCIUM 10 MG/1
10 TABLET, COATED ORAL DAILY
Qty: 100 TABLET | Refills: 3 | Status: SHIPPED | OUTPATIENT
Start: 2025-02-28

## 2025-03-24 ENCOUNTER — OFFICE VISIT (OUTPATIENT)
Dept: SLEEP MEDICINE | Facility: MEDICAL CENTER | Age: 80
End: 2025-03-24
Attending: NURSE PRACTITIONER
Payer: MEDICARE

## 2025-03-24 VITALS
SYSTOLIC BLOOD PRESSURE: 130 MMHG | DIASTOLIC BLOOD PRESSURE: 64 MMHG | BODY MASS INDEX: 42 KG/M2 | WEIGHT: 300 LBS | HEART RATE: 92 BPM | OXYGEN SATURATION: 93 % | RESPIRATION RATE: 14 BRPM | HEIGHT: 71 IN

## 2025-03-24 DIAGNOSIS — G47.33 OSA ON CPAP: ICD-10-CM

## 2025-03-24 PROCEDURE — 99214 OFFICE O/P EST MOD 30 MIN: CPT | Performed by: NURSE PRACTITIONER

## 2025-03-24 PROCEDURE — 99213 OFFICE O/P EST LOW 20 MIN: CPT | Performed by: NURSE PRACTITIONER

## 2025-03-24 PROCEDURE — 3075F SYST BP GE 130 - 139MM HG: CPT | Performed by: NURSE PRACTITIONER

## 2025-03-24 PROCEDURE — 3078F DIAST BP <80 MM HG: CPT | Performed by: NURSE PRACTITIONER

## 2025-03-24 ASSESSMENT — PATIENT HEALTH QUESTIONNAIRE - PHQ9: CLINICAL INTERPRETATION OF PHQ2 SCORE: 0

## 2025-03-24 NOTE — PROGRESS NOTES
Chief Complaint   Patient presents with    Follow-Up     SLEEP - ESTABLISHED PT CHART PREP COMPLETED ON 03/06/2025     Last Office Visit 06/28/2024 with Deshaun Mahan    1st Compliance: Yes    DME: DME:  [employer] / ph [phone] / fx [phone]      PAP/O2/OAT: achine brand: ResMed  Machine Type: Auto CPAP  Auto CPAP [id number] 7    Wireless Data? YES             HPI:  Andrei Galeas is a 79 y.o. year old male here today for follow-up on KATIA annual follow-up.  Last seen 6/28/2024 by me. Patient is a previous [facility] patient with studies done approximately 15 to 20 years ago.He has recently moved back to [place] full-time and is establishing care in sleep medicine at [facility].  She was previously treated in [place] at [facility] near [facility].     DME company, [employer].  Patient received a CPAP a few months ago.  Currently using a nasal pillow with heated tubing. Patient does state that he is unable to stay asleep at nighttime. He wakes up frequently. He also experiences daytime fatigue and does nap. He uses CPAP whenever sleeping. Denies any excessive daytime sleepiness, morning headaches, palpitations, concentration or memory problems. Previously he was trying to use Benadryl to help him stay asleep without finding medication effective.     30-day compliance shows 100% use with an average time of 8 hours and 45 minutes and a residual AHI of 1.4 with no evidence of mask leak.      ROS: As per HPI and otherwise negative if not stated.    Past Medical History:   Diagnosis Date    Arthritis     Back pain     Cataract     Depression     Diabetes ([facility])     borderline    Hypertension     Obstructive chronic bronchitis without exacerbation ([facility]) 8/5/2022    Shortness of breath     Sinusitis     Sleep apnea     Wheezing        Past Surgical History:   Procedure Laterality Date    IL DSTR NROLYTC AGNT PARVERTEB FCT SNGL LMBR/SACRAL Bilateral 7/6/2023    Procedure: RIGHT and LEFT radiofrequency neurotomies medial  "branch targeting the L4-5 and L5-S1 facet joints with fluoroscopic guidance and sedation, Plan for 80 °C for 90 seconds for each neurotomy;  Surgeon: Claudia Cervantes M.D.;  [facility];  Service: Pain Management    NM INJ DX/THER AGNT PARAVERT FACET JOINT, CELESTE* Bilateral 6/1/2023    Procedure: Diagnostic medial branch blocks targeting the BILATERAL L4-5 and L5-S1 facet joints with fluoroscopic guidance #2;  Surgeon: Claudia Cervantes M.D.;  Location: [facility];  Service: Pain Management    INJ,ANES LUMBAR/CERVICAL Bilateral 5/16/2023    Procedure: Diagnostic medial branch blocks targeting the BILATERAL L4-5 and L5-S1 facet joints with fluoroscopic guidance #1;  Surgeon: Claudia Cervantes M.D.;  Location: [facility];  Service: Pain Management    NM INJ LUMBAR/SACRAL,W/ IMAGING Left 12/20/2022    Procedure: LEFT lumbar L5-S1 interlaminar epidural steroid injection.;  Surgeon: Claudia Cervantes M.D.;  Location: [facility];  Service: Pain Management    SINUSOTOMIES         Family History   Problem Relation Age of Onset    Heart Disease Paternal Uncle         MI    Arthritis Mother     Psychiatric Illness Father         stomach cancer    Hypertension Father     Hyperlipidemia Father     Stroke Father     Cancer Father        Allergies as of 03/24/2025 - Reviewed 03/24/2025   Allergen Reaction Noted    Seasonal  05/05/2023    Penicillins Unspecified 09/13/2013        Vitals:  /64 (BP Location: Left arm, Patient Position: Sitting, BP Cuff Size: Adult)   Pulse 92   Resp 14   Ht 1.803 m (5' 11\")   Wt (!) 136 kg (300 lb)   SpO2 93%     Current medications as of today   Current Outpatient Medications   Medication Sig Dispense Refill    rosuvastatin (CRESTOR) 10 MG Tab Take 1 tablet by mouth once daily 100 Tablet 3    losartan-hydrochlorothiazide (HYZAAR) 100-25 MG per tablet Take 1 tablet by mouth once daily 100 Tablet 2    benzonatate " (TESSALON) 100 MG Cap Take 1 Capsule by mouth 3 times a day as needed for Cough. 60 Capsule 0    traZODone (DESYREL) 100 MG Tab Take 1 Tablet by mouth every evening. 30 Tablet 3    tamsulosin (FLOMAX) 0.4 MG capsule TAKE 2 CAPSULES BY MOUTH ONCE DAILY AS DIRECTED AFTER A MEAL IN THE EVENING 180 Capsule 3    finasteride (PROSCAR) 5 MG Tab Take 1 Tablet by mouth every day. 90 Tablet 3    diphenhydrAMINE-APAP, sleep, (TYLENOL PM EXTRA STRENGTH)  MG Tab Take 1 Tablet by mouth at bedtime as needed.      fexofenadine (ALLEGRA) 60 MG Tab Take 60 mg by mouth every day.      multivitamin (THERAGRAN) Tab Take 1 Tab by mouth every day.      fluticasone (FLONASE) 50 MCG/ACT nasal spray Administer 1 Spray into affected nostril(S) every day.       No current facility-administered medications for this visit.         Physical Exam:   Gen:           Alert and oriented, No apparent distress. Mood and affect appropriate, normal interaction with examiner.  Eyes:          PERRL, EOM intact, sclere white, conjunctive moist.  Ears:          Not examined.   Hearing:     Grossly intact.  Nose:          Normal, no lesions or deformities.  Dentition:    Good dentition.  Oropharynx:   Tongue normal, posterior pharynx without erythema or exudate.  Neck:        Supple, trachea midline, no masses.  Respiratory Effort: No intercostal retractions or use of accessory muscles.   Lung Auscultation:      Clear to auscultation bilaterally; no rales, rhonchi or wheezing.  CV:            Regular rate and rhythm. No murmurs, rubs or gallops.  Abd:           Not examined.   Lymphadenopathy: Not examined.  Gait and Station: Normal.  Digits and Nails: No clubbing, cyanosis, petechiae, or nodes.   Cranial Nerves: II-XII grossly intact.  Skin:        No rashes, lesions or ulcers noted.               Ext:           No cyanosis or edema.      Assessment:  1. KATIA on CPAP  DME Mask and Supplies        Plan:  Presents for first compliance on new CPAP.  Using  and benefiting from CPAP therapy.  Compliance shows adequate use of CPAP and control of KATIA with a residual AHI of 1.4.  Order placed for mask and supplies.  Patient advised to follow-up in 1 year, but can be seen sooner if needed.    Please note that this dictation was created using voice recognition software. I have made every reasonable attempt to correct obvious errors, but it is possible there are errors of grammar and possibly content that I did not discover before finalizing the note.

## 2025-04-15 SDOH — ECONOMIC STABILITY: FOOD INSECURITY: WITHIN THE PAST 12 MONTHS, YOU WORRIED THAT YOUR FOOD WOULD RUN OUT BEFORE YOU GOT MONEY TO BUY MORE.: NEVER TRUE

## 2025-04-15 SDOH — ECONOMIC STABILITY: INCOME INSECURITY: IN THE LAST 12 MONTHS, WAS THERE A TIME WHEN YOU WERE NOT ABLE TO PAY THE MORTGAGE OR RENT ON TIME?: NO

## 2025-04-15 SDOH — HEALTH STABILITY: PHYSICAL HEALTH
ON AVERAGE, HOW MANY DAYS PER WEEK DO YOU ENGAGE IN MODERATE TO STRENUOUS EXERCISE (LIKE A BRISK WALK)?: PATIENT DECLINED

## 2025-04-15 SDOH — ECONOMIC STABILITY: INCOME INSECURITY: HOW HARD IS IT FOR YOU TO PAY FOR THE VERY BASICS LIKE FOOD, HOUSING, MEDICAL CARE, AND HEATING?: NOT VERY HARD

## 2025-04-15 SDOH — ECONOMIC STABILITY: FOOD INSECURITY: WITHIN THE PAST 12 MONTHS, THE FOOD YOU BOUGHT JUST DIDN'T LAST AND YOU DIDN'T HAVE MONEY TO GET MORE.: NEVER TRUE

## 2025-04-15 SDOH — HEALTH STABILITY: PHYSICAL HEALTH: ON AVERAGE, HOW MANY MINUTES DO YOU ENGAGE IN EXERCISE AT THIS LEVEL?: PATIENT DECLINED

## 2025-04-15 ASSESSMENT — SOCIAL DETERMINANTS OF HEALTH (SDOH)
DO YOU BELONG TO ANY CLUBS OR ORGANIZATIONS SUCH AS CHURCH GROUPS UNIONS, FRATERNAL OR ATHLETIC GROUPS, OR SCHOOL GROUPS?: YES
HOW OFTEN DO YOU HAVE SIX OR MORE DRINKS ON ONE OCCASION: NEVER
IN THE PAST 12 MONTHS, HAS THE ELECTRIC, GAS, OIL, OR WATER COMPANY THREATENED TO SHUT OFF SERVICE IN YOUR HOME?: NO
HOW HARD IS IT FOR YOU TO PAY FOR THE VERY BASICS LIKE FOOD, HOUSING, MEDICAL CARE, AND HEATING?: NOT VERY HARD
HOW OFTEN DO YOU GET TOGETHER WITH FRIENDS OR RELATIVES?: ONCE A WEEK
HOW OFTEN DO YOU ATTENT MEETINGS OF THE CLUB OR ORGANIZATION YOU BELONG TO?: MORE THAN 4 TIMES PER YEAR
WITHIN THE PAST 12 MONTHS, YOU WORRIED THAT YOUR FOOD WOULD RUN OUT BEFORE YOU GOT THE MONEY TO BUY MORE: NEVER TRUE
HOW OFTEN DO YOU GET TOGETHER WITH FRIENDS OR RELATIVES?: ONCE A WEEK
IN A TYPICAL WEEK, HOW MANY TIMES DO YOU TALK ON THE PHONE WITH FAMILY, FRIENDS, OR NEIGHBORS?: THREE TIMES A WEEK
DO YOU BELONG TO ANY CLUBS OR ORGANIZATIONS SUCH AS CHURCH GROUPS UNIONS, FRATERNAL OR ATHLETIC GROUPS, OR SCHOOL GROUPS?: YES
HOW OFTEN DO YOU HAVE A DRINK CONTAINING ALCOHOL: NEVER
HOW MANY DRINKS CONTAINING ALCOHOL DO YOU HAVE ON A TYPICAL DAY WHEN YOU ARE DRINKING: PATIENT DOES NOT DRINK
HOW OFTEN DO YOU ATTEND CHURCH OR RELIGIOUS SERVICES?: MORE THAN 4 TIMES PER YEAR
HOW OFTEN DO YOU ATTEND CHURCH OR RELIGIOUS SERVICES?: MORE THAN 4 TIMES PER YEAR
HOW OFTEN DO YOU ATTENT MEETINGS OF THE CLUB OR ORGANIZATION YOU BELONG TO?: MORE THAN 4 TIMES PER YEAR
IN A TYPICAL WEEK, HOW MANY TIMES DO YOU TALK ON THE PHONE WITH FAMILY, FRIENDS, OR NEIGHBORS?: THREE TIMES A WEEK

## 2025-04-15 ASSESSMENT — LIFESTYLE VARIABLES
SKIP TO QUESTIONS 9-10: 1
HOW OFTEN DO YOU HAVE A DRINK CONTAINING ALCOHOL: NEVER
HOW MANY STANDARD DRINKS CONTAINING ALCOHOL DO YOU HAVE ON A TYPICAL DAY: PATIENT DOES NOT DRINK
HOW OFTEN DO YOU HAVE SIX OR MORE DRINKS ON ONE OCCASION: NEVER
AUDIT-C TOTAL SCORE: 0

## 2025-04-17 ENCOUNTER — OFFICE VISIT (OUTPATIENT)
Dept: MEDICAL GROUP | Facility: LAB | Age: 80
End: 2025-04-17
Payer: MEDICARE

## 2025-04-17 VITALS
BODY MASS INDEX: 36.4 KG/M2 | HEIGHT: 71 IN | HEART RATE: 74 BPM | TEMPERATURE: 98.1 F | WEIGHT: 260 LBS | OXYGEN SATURATION: 94 % | DIASTOLIC BLOOD PRESSURE: 72 MMHG | RESPIRATION RATE: 16 BRPM | SYSTOLIC BLOOD PRESSURE: 120 MMHG

## 2025-04-17 DIAGNOSIS — I10 ESSENTIAL HYPERTENSION: ICD-10-CM

## 2025-04-17 DIAGNOSIS — H91.93 BILATERAL HEARING LOSS, UNSPECIFIED HEARING LOSS TYPE: ICD-10-CM

## 2025-04-17 DIAGNOSIS — Z00.00 ANNUAL WELLNESS VISIT: ICD-10-CM

## 2025-04-17 DIAGNOSIS — I72.3 ANEURYSM OF RIGHT COMMON ILIAC ARTERY (HCC): ICD-10-CM

## 2025-04-17 DIAGNOSIS — R53.83 OTHER FATIGUE: ICD-10-CM

## 2025-04-17 DIAGNOSIS — G47.33 OBSTRUCTIVE SLEEP APNEA: ICD-10-CM

## 2025-04-17 DIAGNOSIS — I70.0 ATHEROSCLEROSIS OF AORTA (HCC): ICD-10-CM

## 2025-04-17 DIAGNOSIS — E78.5 HYPERLIPIDEMIA, UNSPECIFIED HYPERLIPIDEMIA TYPE: ICD-10-CM

## 2025-04-17 DIAGNOSIS — L98.9 SKIN LESION: ICD-10-CM

## 2025-04-17 DIAGNOSIS — Z12.5 ENCOUNTER FOR SCREENING FOR MALIGNANT NEOPLASM OF PROSTATE: ICD-10-CM

## 2025-04-17 DIAGNOSIS — R73.9 BLOOD GLUCOSE ELEVATED: ICD-10-CM

## 2025-04-17 DIAGNOSIS — J44.89 OBSTRUCTIVE CHRONIC BRONCHITIS WITHOUT EXACERBATION (HCC): ICD-10-CM

## 2025-04-17 PROBLEM — R06.09 DYSPNEA ON EXERTION: Status: RESOLVED | Noted: 2022-03-28 | Resolved: 2025-04-17

## 2025-04-17 RX ORDER — FLUTICASONE FUROATE AND VILANTEROL 200; 25 UG/1; UG/1
POWDER RESPIRATORY (INHALATION)
COMMUNITY
End: 2025-04-17

## 2025-04-17 RX ORDER — FINASTERIDE 5 MG/1
1 TABLET, FILM COATED ORAL DAILY
COMMUNITY
Start: 2025-03-13 | End: 2025-04-17

## 2025-04-17 RX ORDER — HYDROCODONE BITARTRATE AND ACETAMINOPHEN 5; 325 MG/1; MG/1
TABLET ORAL
COMMUNITY
End: 2025-04-17

## 2025-04-17 RX ORDER — FLUTICASONE FUROATE AND VILANTEROL 200; 25 UG/1; UG/1
1 POWDER RESPIRATORY (INHALATION) DAILY
COMMUNITY
End: 2025-04-17

## 2025-04-17 RX ORDER — TRIAMCINOLONE ACETONIDE 1 MG/G
OINTMENT TOPICAL
COMMUNITY
End: 2025-04-17

## 2025-04-17 RX ORDER — ROSUVASTATIN CALCIUM 10 MG/1
1 TABLET, COATED ORAL DAILY
COMMUNITY
End: 2025-04-17

## 2025-04-17 RX ORDER — ALBUTEROL SULFATE 90 UG/1
INHALANT RESPIRATORY (INHALATION)
COMMUNITY
End: 2025-04-17

## 2025-04-17 RX ORDER — FEXOFENADINE HCL 180 MG/1
1 TABLET ORAL
COMMUNITY
End: 2025-04-17

## 2025-04-17 RX ORDER — ATORVASTATIN CALCIUM 10 MG/1
1 TABLET, FILM COATED ORAL
COMMUNITY
End: 2025-04-17

## 2025-04-17 RX ORDER — OLMESARTAN MEDOXOMIL 20 MG/1
1 TABLET ORAL DAILY
COMMUNITY
End: 2025-04-17

## 2025-04-17 RX ORDER — KETOCONAZOLE 20 MG/ML
SHAMPOO, SUSPENSION TOPICAL
COMMUNITY
End: 2025-04-17

## 2025-04-17 RX ORDER — IBUPROFEN 800 MG/1
TABLET, FILM COATED ORAL
COMMUNITY
End: 2025-04-17

## 2025-04-17 RX ORDER — TRAZODONE HYDROCHLORIDE 50 MG/1
1 TABLET ORAL
COMMUNITY
End: 2025-04-17

## 2025-04-17 RX ORDER — MELOXICAM 15 MG/1
1 TABLET ORAL
COMMUNITY
End: 2025-04-17

## 2025-04-17 RX ORDER — TRAZODONE HYDROCHLORIDE 100 MG/1
1 TABLET ORAL EVERY EVENING
COMMUNITY
End: 2025-04-17

## 2025-04-17 RX ORDER — FLUTICASONE PROPIONATE 50 MCG
1 SPRAY, SUSPENSION (ML) NASAL DAILY
COMMUNITY
End: 2025-04-17

## 2025-04-17 RX ORDER — IPRATROPIUM BROMIDE 42 UG/1
SPRAY, METERED NASAL
COMMUNITY
End: 2025-04-17

## 2025-04-17 RX ORDER — FLUOCINONIDE TOPICAL SOLUTION USP, 0.05% 0.5 MG/ML
SOLUTION TOPICAL
COMMUNITY
End: 2025-04-17

## 2025-04-17 RX ORDER — TAMSULOSIN HYDROCHLORIDE 0.4 MG/1
1 CAPSULE ORAL
COMMUNITY
End: 2025-04-17

## 2025-04-17 ASSESSMENT — PATIENT HEALTH QUESTIONNAIRE - PHQ9
5. POOR APPETITE OR OVEREATING: 1 - SEVERAL DAYS
CLINICAL INTERPRETATION OF PHQ2 SCORE: 1
SUM OF ALL RESPONSES TO PHQ QUESTIONS 1-9: 3

## 2025-04-17 ASSESSMENT — ENCOUNTER SYMPTOMS: GENERAL WELL-BEING: FAIR

## 2025-04-17 ASSESSMENT — ACTIVITIES OF DAILY LIVING (ADL): BATHING_REQUIRES_ASSISTANCE: 0

## 2025-04-17 NOTE — PROGRESS NOTES
Chief Complaint   Patient presents with    Annual Exam       HPI:  Andrei Galeas is a 79 y.o. here for Medicare Annual Wellness Visit     # Skin lesion:  Patient states that he continues to have significant erythematous, raised skin lesions that has been progressing across arms bilaterally.  Recently he is notices similar skin lesions in his groin.  He states that he follow-up with dermatology at [facility]; however, no real answers were given.  He states that now it is spreading even further into his legs.  He denies any pain, pruritus.  Denies any new lotions or soaps or anything that he has been putting on his skin.  Denies any fevers, chills, weight loss.  Does state these had increased fatigue.    # Fatigue  For last several months patient experiencing significant fatigue.  He states that he feels slightly short of breath but has noticed significant polydipsia and dry mouth.  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) 01/25/2023    Prediabetes 01/25/2023    Lower urinary tract symptoms due to benign prostatic hyperplasia 01/25/2023    Insomnia 01/25/2023    Obstructive chronic bronchitis without exacerbation ([facility]) 08/05/2022    Dyspnea on exertion 03/28/2022    Essential hypertension 09/13/2013    Hyperlipidemia 09/13/2013    Morbid obesity with BMI of 40.0-44.9, adult ([facility]) 09/13/2013    Obstructive sleep apnea 09/13/2013       Current Outpatient Medications   Medication Sig Dispense Refill    ketoconazole (NIZORAL) 2 % shampoo ketoconazole 2 % shampoo   WASH HAIR WITH SHAMPOO THREE TIMES A WEEK, LEAVE IN HAIR FOR 5 MINUTES BEFORE WASHING OUT.      rosuvastatin (CRESTOR) 10 MG Tab Take 1 tablet by mouth once daily 100 Tablet 3    losartan-hydrochlorothiazide (HYZAAR) 100-25 MG per tablet  Take 1 tablet by mouth once daily 100 Tablet 2    traZODone (DESYREL) 100 MG Tab Take 1 Tablet by mouth every evening. 30 Tablet 3    tamsulosin (FLOMAX) 0.4 MG capsule TAKE 2 CAPSULES BY MOUTH ONCE DAILY AS DIRECTED AFTER A MEAL IN THE EVENING 180 Capsule 3    finasteride (PROSCAR) 5 MG Tab Take 1 Tablet by mouth every day. 90 Tablet 3    diphenhydrAMINE-APAP, sleep, (TYLENOL PM EXTRA STRENGTH)  MG Tab Take 1 Tablet by mouth at bedtime as needed.      fexofenadine (ALLEGRA) 60 MG Tab Take 60 mg by mouth every day.      multivitamin (THERAGRAN) Tab Take 1 Tab by mouth every day.      fluticasone (FLONASE) 50 MCG/ACT nasal spray Administer 1 Spray into affected nostril(S) every day.       No current facility-administered medications for this visit.          Current supplements as per medication list.     Allergies: Penicillins and Seasonal    Current social contact/activities: staying active, no concerns.      He  reports that he quit smoking about 47 years ago. His smoking use included cigarettes. He started smoking about 57 years ago. He has a 20 pack-year smoking history. He has never used smokeless tobacco. He reports current alcohol use. He reports that he does not use drugs.  Counseling given: Not Answered      ROS:    Gait: Uses no assistive device  Ostomy: No  Other tubes: No  Amputations: No  Chronic oxygen use: No  Last eye exam: 1-2 years.   Wears hearing aids: No   : Reports urinary leakage during the last 6 months that has not interfered at all with their daily activities or sleep.    Screening:    Depression Screening  Little interest or pleasure in doing things?  0 - not at all  Feeling down, depressed , or hopeless? 1 - several days  Trouble falling or staying asleep, or sleeping too much?  1 - several days  Feeling tired or having little energy?  0 - not at all  Poor appetite or overeating?  1 - several days  Feeling bad about yourself - or that you are a failure or have let yourself or your  family down? 0 - not at all  Trouble concentrating on things, such as reading the newspaper or watching television? 0 - not at all  Moving or speaking so slowly that other people could have noticed.  Or the opposite - being so fidgety or restless that you have been moving around a lot more than usual?  0 - not at all  Thoughts that you would be better off dead, or of hurting yourself?  0 - not at all  Patient Health Questionnaire Score: 3    If depressive symptoms identified deferred to follow up visit unless specifically addressed in assessment and plan.    Interpretation of PHQ-9 Total Score   Score Severity   1-4 No Depression   5-9 Mild Depression   10-14 Moderate Depression   15-19 Moderately Severe Depression   20-27 Severe Depression    Screening for Cognitive Impairment  Do you or any of your friends or family members have any concern about your memory? No  Three Minute Recall (Village, Kitchen, Baby) 3/3    Kirit clock face with all 12 numbers and set the hands to show 10 minutes past 11.  Yes    Cognitive concerns identified deferred for follow up unless specifically addressed in assessment and plan.    Fall Risk Assessment  Has the patient had two or more falls in the last year or any fall with injury in the last year?  No    Safety Assessment  Do you always wear your seatbelt?  Yes  Any changes to home needed to function safely? No  Difficulty hearing.  No  Patient counseled about all safety risks that were identified.    Functional Assessment ADLs  Are there any barriers preventing you from cooking for yourself or meeting nutritional needs?  No.    Are there any barriers preventing you from driving safely or obtaining transportation?  No.    Are there any barriers preventing you from using a telephone or calling for help?  No    Are there any barriers preventing you from shopping?  No.    Are there any barriers preventing you from taking care of your own finances?  No    Are there any barriers preventing  you from managing your medications?  No    Are there any barriers preventing you from showering, bathing or dressing yourself? No    Are there any barriers preventing you from doing housework or laundry? No    Are there any barriers preventing you from using the toilet?No    Are you currently engaging in any exercise or physical activity?  No.      Self-Assessment of Health  What is your perception of your health? Fair    Do you sleep more than six hours a night? No    In the past 7 days, how much did pain keep you from doing your normal work? None    Do you spend quality time with family or friends (virtually or in person)? Yes    Do you usually eat a heart healthy diet that constists of a variety of fruits, vegetables, whole grains and fiber? Yes    Do you eat foods high in fat and/or Fast Food more than three times per week? Yes    How concerned are you that your medical conditions are not being well managed? Not at all    Are you worried that in the next 2 months, you may not have stable housing that you own, rent, or stay in as part of a household? No        Advance Care Planning  Do you have an Advance Directive, Living Will, Durable Power of , or POLST? Yes      Durable Power of    is not on file - instructed patient to bring in a copy to scan into their chart      Health Maintenance Summary            Current Care Gaps       Prostate Specific Antigen (PSA) Screening (Yearly) Overdue since 4/18/2023 04/18/2022  Prostatic Specific Antigen Tot component of PROSTATE SPECIFIC AG SCREENING    12/14/2012  Prostatic Specific Antigen Tot component of PROSTATE SPECIFIC AG              Annual Pulmonary Function Test / Spirometry (Yearly) Overdue since 8/17/2023 08/17/2022  PFT DICTATED RESULTS              Annual Wellness Visit (Yearly) Overdue since 1/25/2024 01/25/2023  Level of Service: [place] ANNUAL WELLNESS VISIT-INCLUDES PPPS SUBSEQUE*              COVID-19 Vaccine (4 - 2024-25 season)  Overdue since 9/1/2024      10/06/2021  Imm Admin: PFIZER PURPLE CAP SARS-COV-2 VACCINATION (12+)    03/03/2021  Imm Admin: PFIZER PURPLE CAP SARS-COV-2 VACCINATION (12+)    02/10/2021  Imm Admin: PFIZER PURPLE CAP SARS-COV-2 VACCINATION (12+)                      Needs Review       Hepatitis C Screening  Tentatively Complete      04/18/2022  Hepatitis C Antibody component of HEP C VIRUS ANTIBODY                      Upcoming       Influenza Vaccine (Season Ended) Next due on 9/1/2025 12/06/2023  Outside Immunization: Fluzone High-Dose Quad    11/02/2022  Imm Admin: Influenza Vaccine Adult HD    10/01/2021  Imm Admin: Influenza Vaccine Adult HD    09/28/2020  Imm Admin: Influenza Vaccine Adult HD    09/12/2019  Imm Admin: Influenza Vaccine Adult HD     Only the first 5 history entries have been loaded, but more history exists.            IMM DTaP/Tdap/Td Vaccine (3 - Td or Tdap) Next due on 1/2/2034 01/02/2024  Imm Admin: Tdap Vaccine    07/31/2018  Imm Admin: Tdap Vaccine                      Completed or No Longer Recommended       Zoster (Shingles) Vaccines (Series Information) Completed      02/20/2020  Imm Admin: Zoster Vaccine Recombinant (RZV) (SHINGRIX)    10/20/2019  Imm Admin: Zoster Vaccine Recombinant (RZV) (SHINGRIX)    05/17/2016  Imm Admin: Zoster Vaccine Live (ZVL) (Zostavax) - HISTORICAL DATA              Pneumococcal Vaccine: 50+ Years (Series Information) Completed      09/12/2019  Imm Admin: Pneumococcal polysaccharide vaccine (PPSV-23)    09/30/2017  Imm Admin: Pneumococcal Conjugate Vaccine (Prevnar/PCV-13)              Hepatitis A Vaccine (Hep A) (Series Information) Aged Out      No completion history exists for this topic.              Hepatitis B Vaccine (Hep B) (Series Information) Aged Out     No completion history exists for this topic.              HPV Vaccines (Series Information) Aged Out     No completion history exists for this topic.              Polio Vaccine  (Inactivated Polio) (Series Information) Aged Out     No completion history exists for this topic.              Meningococcal Immunization (Series Information) Aged Out     No completion history exists for this topic.              Colorectal Cancer Screening  Discontinued        Frequency changed to Never automatically (Topic No Longer Applies)    10/09/2021  REFERRAL TO GI FOR COLONOSCOPY    2021  REFERRAL TO GI FOR COLONOSCOPY    2021  REFERRAL TO GI FOR COLONOSCOPY              Abdominal Aortic Aneurysm (AAA) Screening  Discontinued        Frequency changed to Never automatically (Topic No Longer Applies)    2022  US-ABDOMINAL AORTA W/O DOPPLER                            Patient Care Team:  Leo Villegas M.D. as PCP - General (Family Medicine)  [employer] (DME Supplier)  Khloe Jennings P.A.-C. (Pulmonary Medicine)  Germain Damon P.A.-C. (Urology)  [employer] (DME Supplier)  [employer] (DME Supplier)      Social History     Tobacco Use    Smoking status: Former     Current packs/day: 0.00     Average packs/day: 2.0 packs/day for 10.0 years (20.0 ttl pk-yrs)     Types: Cigarettes     Start date: 1967     Quit date: 1977     Years since quittin.6    Smokeless tobacco: Never   Vaping Use    Vaping status: Never Used   Substance Use Topics    Alcohol use: Yes     Comment: 1-2 drinks every week or two    Drug use: No     Family History   Problem Relation Age of Onset    Heart Disease Paternal Uncle         MI    Arthritis Mother     Psychiatric Illness Father         stomach cancer    Hypertension Father     Hyperlipidemia Father     Stroke Father     Cancer Father      He  has a past medical history of Arthritis, Back pain, Cataract, Depression, Diabetes (HCC), Hypertension, Obstructive chronic bronchitis without exacerbation (HCC) (2022), Shortness of breath, Sinusitis, Sleep apnea, and Wheezing.    He has no past medical history of Anginal syndrome (HCC), Arrhythmia,  "Asthma, Blood clotting disorder (HCC), CAD (coronary artery disease), Cancer (HCC), Congestive heart failure (HCC), Cough, Dialysis patient (HCC), Dilated cardiomyopathy (HCC), Disorder of thyroid, Fall, Glaucoma, Gynecological disorder, Heart murmur, Heart valve disease, Hemorrhagic disorder (HCC), History of - myocardial infarction, Indigestion, Infectious disease, Jaundice, Myocardial infarct (HCC), Pacemaker, Painful breathing, Pneumonia, Renal disorder, Rheumatic fever, Seizure (HCC), Sputum production, Stroke (HCC), or Urinary incontinence.   Past Surgical History:   Procedure Laterality Date    NE DSTR NROLYTC AGNT PARVERTEB FCT SNGL LMBR/SACRAL Bilateral 7/6/2023    Procedure: RIGHT and LEFT radiofrequency neurotomies medial branch targeting the L4-5 and L5-S1 facet joints with fluoroscopic guidance and sedation, Plan for 80 °C for 90 seconds for each neurotomy;  Surgeon: Claudia Cervantes M.D.;  [facility];  Service: Pain Management    NE INJ DX/THER AGNT PARAVERT FACET JOINT, CELESTE* Bilateral 6/1/2023    Procedure: Diagnostic medial branch blocks targeting the BILATERAL L4-5 and L5-S1 facet joints with fluoroscopic guidance #2;  Surgeon: Claudia Cervantes M.D.;  [facility];  Service: Pain Management    INJ,ANES LUMBAR/CERVICAL Bilateral 5/16/2023    Procedure: Diagnostic medial branch blocks targeting the BILATERAL L4-5 and L5-S1 facet joints with fluoroscopic guidance #1;  Surgeon: Claudia Cervantes M.D.;  [facility];  Service: Pain Management    NE INJ LUMBAR/SACRAL,W/ IMAGING Left 12/20/2022    Procedure: LEFT lumbar L5-S1 interlaminar epidural steroid injection.;  Surgeon: Claudia Cervantes M.D.;  [facility];  Service: Pain Management    SINUSOTOMIES         Exam:   /72   Pulse 74   Temp 36.7 °C (98.1 °F) (Temporal)   Resp 16   Ht 1.803 m (5' 10.98\")   Wt 118 kg (260 lb)   SpO2 94%  Body " mass index is 36.28 kg/m².    Hearing excellent.    Dentition good  Alert, oriented in no acute distress.  Eye contact is good, speech goal directed, affect calm  Skin: Raised, erythematous patches noted across arms, legs.  Some patches do have clear center.  Slightly more erythematous borders noted.  No satellite lesions noted.  No excoriation marks noted.    Assessment and Plan. The following treatment and monitoring plan is recommended:      1. Annual wellness visit  -Reviewed labs, screens, vaccinations.  Will complete lab work as ordered below.  Routine anticipatory is given.  Patient will follow for annual wellness visit on yearly basis.  - Subsequent Annual Wellness Visit - Includes PPPS ()    2. Skin lesion  -This is an ongoing problem for several months which appears to be worsening at this time.  Uncertain etiology of skin lesion.  Will refer to [facility] for further evaluation.  - Referral to Dermatology    3. Other fatigue  -New problem, uncertain diagnosis.  Given symptoms of fatigue and polydipsia we will rule out type 2 diabetes.  Will check labs below for any other causes.  Will follow-up with patient with results.  - CBC WITHOUT DIFFERENTIAL; Future  - Comp Metabolic Panel; Future  - TSH WITH REFLEX TO FT4; Future    4. 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  Please continue current regimen: Continue to monitor at regular intervals.  Blood pressure controlled.    5. Atherosclerosis of aorta (HCC)  -This problem is chronic, stable, and monitored appropriately by history/labs/imaging as needed, and is well-controlled on the current regimen.  Please continue current regimen continue rosuvastatin 10 mg daily.    6. Essential hypertension  -This problem is chronic, stable, and monitored appropriately by history/labs/imaging as needed, and is  well-controlled on the current regimen.  Please continue current regimen: Continue losartan-hydrochlorothiazide 100-25 mg daily.  Continue with appropriate diet and exercise regimen.    7. Hyperlipidemia, unspecified hyperlipidemia type  -This problem is chronic, stable, and monitored appropriately by history/labs/imaging as needed, and is well-controlled on the current regimen.  Please continue current regimen continue rosuvastatin 10 mg daily.  - Lipid Profile; Future    8. Obstructive chronic bronchitis without exacerbation (HCC)  -This problem is chronic, stable, and monitored appropriately by history/labs/imaging as needed, and is well-controlled on the current regimen.  Please continue current regimen: Patient states that he is breathing well, only occasional shortness of breath.  He has not been using any inhalers at this time.  He will continue to monitor symptoms and follow-up as needed.    9. Obstructive sleep apnea  -This problem is chronic, stable, and monitored appropriately by history/labs/imaging as needed, and is well-controlled on the current regimen.  Please continue current regimen: Will continue use of CPAP to the direction of sleep medicine.    10. Blood glucose elevated  -Review of previous labs that show elevated blood glucose.  Given these findings as well as new polydipsia and increased fatigue we will check A1c.  - HEMOGLOBIN A1C; Future    11. Encounter for screening for malignant neoplasm of prostate  - PROSTATE SPECIFIC AG SCREENING; Future    12. Bilateral hearing loss, unspecified hearing loss type  -Patient states he has noticed some significant hearing loss, requesting referral to audiology.  - Referral to Audiology      Services suggested: No services needed at this time  Health Care Screening: Age-appropriate preventive services recommended by USPTF and ACIP covered by Medicare were discussed today. Services ordered if indicated and agreed upon by the patient.  Referrals offered:  Community-based lifestyle interventions to reduce health risks and promote self-management and wellness, fall prevention, nutrition, physical activity, tobacco-use cessation, weight loss, and mental health services as per orders if indicated.    Discussion today about general wellness and lifestyle habits:    Prevent falls and reduce trip hazards; Cautioned about securing or removing rugs.  Have a working fire alarm and carbon monoxide detector;   Engage in regular physical activity and social activities     Follow-up: No follow-ups on file.

## 2025-04-19 ENCOUNTER — HOSPITAL ENCOUNTER (OUTPATIENT)
Dept: LAB | Facility: MEDICAL CENTER | Age: 80
End: 2025-04-19
Attending: FAMILY MEDICINE
Payer: MEDICARE

## 2025-04-19 DIAGNOSIS — R73.9 BLOOD GLUCOSE ELEVATED: ICD-10-CM

## 2025-04-19 DIAGNOSIS — E78.5 HYPERLIPIDEMIA, UNSPECIFIED HYPERLIPIDEMIA TYPE: ICD-10-CM

## 2025-04-19 DIAGNOSIS — Z12.5 ENCOUNTER FOR SCREENING FOR MALIGNANT NEOPLASM OF PROSTATE: ICD-10-CM

## 2025-04-19 DIAGNOSIS — R53.83 OTHER FATIGUE: ICD-10-CM

## 2025-04-19 LAB
ALBUMIN SERPL BCP-MCNC: 4.2 G/DL (ref 3.2–4.9)
ALBUMIN/GLOB SERPL: 1.4 G/DL
ALP SERPL-CCNC: 46 U/L (ref 30–99)
ALT SERPL-CCNC: 53 U/L (ref 2–50)
ANION GAP SERPL CALC-SCNC: 12 MMOL/L (ref 7–16)
AST SERPL-CCNC: 40 U/L (ref 12–45)
BILIRUB SERPL-MCNC: 0.7 MG/DL (ref 0.1–1.5)
BUN SERPL-MCNC: 11 MG/DL (ref 8–22)
CALCIUM ALBUM COR SERPL-MCNC: 9.3 MG/DL (ref 8.5–10.5)
CALCIUM SERPL-MCNC: 9.5 MG/DL (ref 8.5–10.5)
CHLORIDE SERPL-SCNC: 100 MMOL/L (ref 96–112)
CHOLEST SERPL-MCNC: 154 MG/DL (ref 100–199)
CO2 SERPL-SCNC: 22 MMOL/L (ref 20–33)
CREAT SERPL-MCNC: 0.85 MG/DL (ref 0.5–1.4)
ERYTHROCYTE [DISTWIDTH] IN BLOOD BY AUTOMATED COUNT: 43.4 FL (ref 35.9–50)
EST. AVERAGE GLUCOSE BLD GHB EST-MCNC: 384 MG/DL
GFR SERPLBLD CREATININE-BSD FMLA CKD-EPI: 89 ML/MIN/1.73 M 2
GLOBULIN SER CALC-MCNC: 2.9 G/DL (ref 1.9–3.5)
GLUCOSE SERPL-MCNC: 305 MG/DL (ref 65–99)
HBA1C MFR BLD: 15 % (ref 4–5.6)
HCT VFR BLD AUTO: 45.4 % (ref 42–52)
HDLC SERPL-MCNC: 31 MG/DL
HGB BLD-MCNC: 15.6 G/DL (ref 14–18)
LDLC SERPL CALC-MCNC: ABNORMAL MG/DL
MCH RBC QN AUTO: 30.3 PG (ref 27–33)
MCHC RBC AUTO-ENTMCNC: 34.4 G/DL (ref 32.3–36.5)
MCV RBC AUTO: 88.2 FL (ref 81.4–97.8)
PLATELET # BLD AUTO: 154 K/UL (ref 164–446)
PMV BLD AUTO: 11 FL (ref 9–12.9)
POTASSIUM SERPL-SCNC: 4.3 MMOL/L (ref 3.6–5.5)
PROT SERPL-MCNC: 7.1 G/DL (ref 6–8.2)
PSA SERPL DL<=0.01 NG/ML-MCNC: 0.54 NG/ML (ref 0–4)
RBC # BLD AUTO: 5.15 M/UL (ref 4.7–6.1)
SODIUM SERPL-SCNC: 134 MMOL/L (ref 135–145)
TRIGL SERPL-MCNC: 486 MG/DL (ref 0–149)
TSH SERPL DL<=0.005 MIU/L-ACNC: 3.21 UIU/ML (ref 0.38–5.33)
WBC # BLD AUTO: 5.8 K/UL (ref 4.8–10.8)

## 2025-04-19 PROCEDURE — 80061 LIPID PANEL: CPT

## 2025-04-19 PROCEDURE — 85027 COMPLETE CBC AUTOMATED: CPT

## 2025-04-19 PROCEDURE — 84153 ASSAY OF PSA TOTAL: CPT

## 2025-04-19 PROCEDURE — 80053 COMPREHEN METABOLIC PANEL: CPT

## 2025-04-19 PROCEDURE — 84443 ASSAY THYROID STIM HORMONE: CPT

## 2025-04-19 PROCEDURE — 36415 COLL VENOUS BLD VENIPUNCTURE: CPT

## 2025-04-19 PROCEDURE — 83036 HEMOGLOBIN GLYCOSYLATED A1C: CPT

## 2025-04-21 ENCOUNTER — RESULTS FOLLOW-UP (OUTPATIENT)
Dept: MEDICAL GROUP | Facility: LAB | Age: 80
End: 2025-04-21

## 2025-04-24 ENCOUNTER — OFFICE VISIT (OUTPATIENT)
Dept: MEDICAL GROUP | Facility: LAB | Age: 80
End: 2025-04-24
Payer: MEDICARE

## 2025-04-24 VITALS
RESPIRATION RATE: 16 BRPM | HEART RATE: 66 BPM | HEIGHT: 71 IN | TEMPERATURE: 97.8 F | WEIGHT: 263 LBS | OXYGEN SATURATION: 92 % | DIASTOLIC BLOOD PRESSURE: 54 MMHG | BODY MASS INDEX: 36.82 KG/M2 | SYSTOLIC BLOOD PRESSURE: 128 MMHG

## 2025-04-24 DIAGNOSIS — E11.9 TYPE 2 DIABETES MELLITUS WITHOUT COMPLICATION, WITHOUT LONG-TERM CURRENT USE OF INSULIN (HCC): ICD-10-CM

## 2025-04-24 DIAGNOSIS — G89.29 CHRONIC BILATERAL LOW BACK PAIN WITHOUT SCIATICA: ICD-10-CM

## 2025-04-24 DIAGNOSIS — M54.50 CHRONIC BILATERAL LOW BACK PAIN WITHOUT SCIATICA: ICD-10-CM

## 2025-04-24 PROCEDURE — 3074F SYST BP LT 130 MM HG: CPT | Performed by: FAMILY MEDICINE

## 2025-04-24 PROCEDURE — 3078F DIAST BP <80 MM HG: CPT | Performed by: FAMILY MEDICINE

## 2025-04-24 PROCEDURE — 99214 OFFICE O/P EST MOD 30 MIN: CPT | Performed by: FAMILY MEDICINE

## 2025-04-24 RX ORDER — TIRZEPATIDE 2.5 MG/.5ML
2.5 INJECTION, SOLUTION SUBCUTANEOUS
Qty: 2 ML | Refills: 1 | Status: SHIPPED | OUTPATIENT
Start: 2025-04-24

## 2025-04-24 ASSESSMENT — ENCOUNTER SYMPTOMS: POLYDIPSIA: 1

## 2025-04-24 ASSESSMENT — FIBROSIS 4 INDEX: FIB4 SCORE: 2.75

## 2025-04-24 NOTE — PROGRESS NOTES
Verbal consent was acquired by the patient to use [employer] ambient listening note generation during this visit Yes    Subjective:   Andrei Galeas is a 79 y.o. male here today for   Chief Complaint   Patient presents with    Lab Results     History of Present Illness  The patient is a 79-year-old male who presents today to follow up on a blood test recently completed on 04/19/2025, which showed a hemoglobin A1c of 15%.     A fasting blood glucose level of 305 mg/dL was also noted. He has been managing his diabetes with Mounjaro injections, administered once weekly. Symptoms of extreme dry mouth and difficulty swallowing have been reported, which may be related to elevated blood sugar levels.     Dietary habits include high-protein dinners with vegetables, but frequent snacking is admitted. Tomato juice is considered as an alternative to carbonated beverages, and coffee is consumed with Sweet'N Low. He has been advised against the consumption of bagels or bread for breakfast and to focus on high-protein options such as eggs. Adequate hydration is emphasized, and deep-fried foods are to be avoided.     Physical activity is limited due to back issues, but he has been encouraged to engage in weight-bearing exercises, Pilates, and other muscle-toning activities that he enjoys and can perform comfortably.     A history of a skin rash, previously biopsied and identified as an autoimmune condition, is noted.       Allergies   Allergen Reactions    Penicillins Unspecified     Childhood - unknown reaction    Seasonal          Current medicines (including changes today)  Current Outpatient Medications   Medication Sig Dispense Refill    rosuvastatin (CRESTOR) 10 MG Tab Take 1 tablet by mouth once daily 100 Tablet 3    losartan-hydrochlorothiazide (HYZAAR) 100-25 MG per tablet Take 1 tablet by mouth once daily 100 Tablet 2    tamsulosin (FLOMAX) 0.4 MG capsule TAKE 2 CAPSULES BY MOUTH ONCE DAILY AS DIRECTED AFTER A  "MEAL IN THE EVENING 180 Capsule 3    finasteride (PROSCAR) 5 MG Tab Take 1 Tablet by mouth every day. 90 Tablet 3    diphenhydrAMINE-APAP, sleep, (TYLENOL PM EXTRA STRENGTH)  MG Tab Take 1 Tablet by mouth at bedtime as needed.      fexofenadine (ALLEGRA) 60 MG Tab Take 60 mg by mouth every day.      multivitamin (THERAGRAN) Tab Take 1 Tab by mouth every day.      fluticasone (FLONASE) 50 MCG/ACT nasal spray Administer 1 Spray into affected nostril(S) every day.       No current facility-administered medications for this visit.     He  has a past medical history of Arthritis, Back pain, Cataract, Depression, Diabetes ([facility]), Hypertension, Obstructive chronic bronchitis without exacerbation (HCC) (8/5/2022), Shortness of breath, Sinusitis, Sleep apnea, and Wheezing.  He has no past medical history of Anginal syndrome ([facility]), Arrhythmia, Asthma, Blood clotting disorder ([facility]), CAD (coronary artery disease), Cancer ([facility]), Congestive heart failure ([facility]), Cough, Dialysis patient ([facility]), Dilated cardiomyopathy (HCC), Disorder of thyroid, Fall, Glaucoma, Gynecological disorder, Heart murmur, Heart valve disease, Hemorrhagic disorder ([facility]), History of - myocardial infarction, Indigestion, Infectious disease, Jaundice, Myocardial infarct ([facility]), Pacemaker, Painful breathing, Pneumonia, Renal disorder, Rheumatic fever, Seizure (HCC), Sputum production, Stroke ([facility]), or Urinary incontinence.    ROS   Review of Systems   Constitutional:  Positive for malaise/fatigue.   Endo/Heme/Allergies:  Positive for polydipsia.   -See HPI     Objective:     Physical Exam:  /54   Pulse 66   Temp 36.6 °C (97.8 °F) (Temporal)   Resp 16   Ht 1.803 m (5' 10.98\")   Wt 119 kg (263 lb)   SpO2 92%  Body mass index is 36.7 kg/m².   Constitutional: Alert, no distress, well-groomed.  Skin: No rashes in visible areas.  Eye: Round. Conjunctiva clear, lids normal. No icterus.   ENMT: Lips pink without lesions, good dentition, moist mucous " membranes. Phonation normal.  Neck: No masses, no thyromegaly. Moves freely without pain.  Respiratory: Unlabored respiratory effort, no cough or audible wheeze  Psych: Alert and oriented x3, normal affect and mood.     Results   Latest Reference Range & Units 04/19/25 07:50   WBC 4.8 - 10.8 K/uL 5.8   RBC 4.70 - 6.10 M/uL 5.15   Hemoglobin 14.0 - 18.0 g/dL 15.6   Hematocrit 42.0 - 52.0 % 45.4   MCV 81.4 - 97.8 fL 88.2   MCH 27.0 - 33.0 pg 30.3   MCHC 32.3 - 36.5 g/dL 34.4   RDW 35.9 - 50.0 fL 43.4   Platelet Count 164 - 446 K/uL 154 (L)   MPV 9.0 - 12.9 fL 11.0   Sodium 135 - 145 mmol/L 134 (L)   Potassium 3.6 - 5.5 mmol/L 4.3   Chloride 96 - 112 mmol/L 100   Co2 20 - 33 mmol/L 22   Anion Gap 7.0 - 16.0  12.0   Glucose 65 - 99 mg/dL 305 (H)   Bun 8 - 22 mg/dL 11   Creatinine 0.50 - 1.40 mg/dL 0.85   GFR (CKD-EPI) >60 mL/min/1.73 m 2 89   Calcium 8.5 - 10.5 mg/dL 9.5   Correct Calcium 8.5 - 10.5 mg/dL 9.3   AST(SGOT) 12 - 45 U/L 40   ALT(SGPT) 2 - 50 U/L 53 (H)   Alkaline Phosphatase 30 - 99 U/L 46   Total Bilirubin 0.1 - 1.5 mg/dL 0.7   Albumin 3.2 - 4.9 g/dL 4.2   Total Protein 6.0 - 8.2 g/dL 7.1   Globulin 1.9 - 3.5 g/dL 2.9   A-G Ratio g/dL 1.4   Glycohemoglobin 4.0 - 5.6 % 15.0 (H)   Estim. Avg Glu mg/dL 384   Cholesterol,Tot 100 - 199 mg/dL 154   Triglycerides 0 - 149 mg/dL 486 (H)   HDL >=40 mg/dL 31 !   LDL <100 mg/dL see below   (L): Data is abnormally low  (H): Data is abnormally high  !: Data is abnormal      - Labs:    - Hemoglobin A1c: 15% (04/19/2025)    - Fasting blood glucose: 305 mg/dL (04/19/2025)    Assessment and Plan:     Assessment & Plan  1. Type 2 Diabetes Mellitus  New diagnosis   Fasting blood glucose level is significantly elevated at 305, with a hemoglobin A1c of 15%, indicating poorly controlled diabetes. Symptoms including throat discomfort and a suspected fungal skin rash are likely due to uncontrolled diabetes.  Treatment plan: Initiation of Mounjaro injections at the lowest dose for  one month, followed by reassessment and potential dose increase. Concurrently, metformin therapy will begin with a once-daily morning dose for two weeks, then increasing to twice daily (morning and night). Referral to a nutritionist for dietary planning and management. Advised to avoid juices and sugary drinks, maintain adequate hydration, follow a high-protein diet (at least 90 grams per day), and limit sugar and carbohydrate intake. Regular exercise, including weight-bearing exercises, Pilates, and muscle-toning activities, has been encouraged. An in-office A1c test will be conducted in three months. Insulin therapy may be considered if blood glucose levels remain uncontrolled.    2. Back problems  Reports having back problems, which may make exercise difficult.  Treatment plan: Advised to focus on weight-bearing exercises, Pilates, and other muscle-toning activities that support the back. Encouraged to try various exercises to find enjoyable activities that can be sustained.    Follow-up: Follow-up appointment scheduled in 2 weeks to monitor progress and adjust treatment as necessary.    Orders:  1. Type 2 diabetes mellitus without complication, without long-term current use of insulin (HCC)  - metFORMIN (GLUCOPHAGE) 500 MG Tab; Take 1 Tablet by mouth every day for 14 days, THEN 1 Tablet 2 times a day with meals for 90 days.  Dispense: 194 Tablet; Refill: 0  - Tirzepatide (MOUNJARO) 2.5 MG/0.5ML Solution Auto-injector; Inject 2.5 mg under the skin every 7 days.  Dispense: 2 mL; Refill: 1  - Referral to Nutrition Services    2. Chronic bilateral low back pain without sciatica        Followup: No follow-ups on file.         PLEASE NOTE: This dictation was created using voice recognition and [employer] software. I have made every reasonable attempt to correct obvious errors, but I expect that there are errors of grammar and possibly content that I did not discover before finalizing the note.

## 2025-04-25 NOTE — Clinical Note
REFERRAL APPROVAL NOTICE         Sent on April 25, 2025                   Clyde [facility]  [address]  [place] [address]                   Dear Mr. Galeas,    After a careful review of the medical information and benefit coverage, [facility] has processed your referral. See below for additional details.    If applicable, you must be actively enrolled with your insurance for coverage of the authorized service. If you have any questions regarding your coverage, please contact your insurance directly.    REFERRAL INFORMATION   Referral #:  [id number]  Referred-To Department    Referred-By Provider:  Nam Villegas M.D.   [facility]      [address]  [place] [id number] [phone] [address] [facility] [address] [facility] [address] [phone]    Referral Start Date:  04/24/2025  Referral End Date:   04/24/2026             SCHEDULING  If you do not already have an appointment, please call [phone] to make an appointment.     MORE INFORMATION  If you do not already have a [employer] account, sign up at: [email or web address]  You can access your medical information, make appointments, see lab results, billing information, and more.  If you have questions regarding this referral, please contact  the [facility] Referrals department at:             [phone]. Monday - Friday 8:00AM - 5:00PM.     Sincerely,    [facility]

## 2025-04-28 NOTE — TELEPHONE ENCOUNTER
MEDICATION PRIOR AUTHORIZATION NEEDED:    1. Name of Medication: Mounjaro     2. Requested By (Name of Pharmacy): [facility]      3. Is insurance on file current? Yes    4. What is the name & phone number of the 3rd party payor? [employer] Galeas (Key: [id number])

## 2025-05-07 NOTE — Clinical Note
REFERRAL APPROVAL NOTICE         Sent on May 7, 2025                   Clyde [facility]  [address]  [place] [address]                   Dear Mr. Galeas,    After a careful review of the medical information and benefit coverage, [facility] has processed your referral. See below for additional details.    If applicable, you must be actively enrolled with your insurance for coverage of the authorized service. If you have any questions regarding your coverage, please contact your insurance directly.    REFERRAL INFORMATION   Referral #:  [id number]  Referred-To Department    Referred-By Provider:  Dermatology    Leo Villegas M.D.   [facility]      [address]  [place] [address]  [phone] [address]  [place] [id number] [phone]    Referral Start Date:  04/17/2025  Referral End Date:   04/17/2026             SCHEDULING  If you do not already have an appointment, please call [phone] to make an appointment.     MORE INFORMATION  If you do not already have a [employer] account, sign up at: [email or web address]  You can access your medical information, make appointments, see lab results, billing information, and more.  If you have questions regarding this referral, please contact  the [facility] Referrals department at:             [phone]. Monday - Friday 8:00AM - 5:00PM.     Sincerely,    [facility]

## 2025-05-08 ENCOUNTER — OFFICE VISIT (OUTPATIENT)
Dept: DERMATOLOGY | Facility: IMAGING CENTER | Age: 80
End: 2025-05-08
Payer: MEDICARE

## 2025-05-08 ENCOUNTER — OFFICE VISIT (OUTPATIENT)
Dept: MEDICAL GROUP | Facility: LAB | Age: 80
End: 2025-05-08
Payer: MEDICARE

## 2025-05-08 VITALS
OXYGEN SATURATION: 95 % | BODY MASS INDEX: 37.56 KG/M2 | HEART RATE: 55 BPM | HEIGHT: 71 IN | TEMPERATURE: 97.5 F | WEIGHT: 268.3 LBS | DIASTOLIC BLOOD PRESSURE: 58 MMHG | RESPIRATION RATE: 16 BRPM | SYSTOLIC BLOOD PRESSURE: 124 MMHG

## 2025-05-08 DIAGNOSIS — I87.8 VENOUS STASIS: ICD-10-CM

## 2025-05-08 DIAGNOSIS — E11.9 TYPE 2 DIABETES MELLITUS WITHOUT COMPLICATION, WITHOUT LONG-TERM CURRENT USE OF INSULIN (HCC): ICD-10-CM

## 2025-05-08 DIAGNOSIS — L92.0 GRANULOMA ANNULARE: ICD-10-CM

## 2025-05-08 DIAGNOSIS — L57.0 ACTINIC KERATOSIS: ICD-10-CM

## 2025-05-08 DIAGNOSIS — L82.1 SEBORRHEIC KERATOSIS: ICD-10-CM

## 2025-05-08 PROCEDURE — 17003 DESTRUCT PREMALG LES 2-14: CPT | Performed by: DERMATOLOGY

## 2025-05-08 PROCEDURE — 3074F SYST BP LT 130 MM HG: CPT | Performed by: FAMILY MEDICINE

## 2025-05-08 PROCEDURE — 17000 DESTRUCT PREMALG LESION: CPT | Performed by: DERMATOLOGY

## 2025-05-08 PROCEDURE — 99214 OFFICE O/P EST MOD 30 MIN: CPT | Performed by: FAMILY MEDICINE

## 2025-05-08 PROCEDURE — 3078F DIAST BP <80 MM HG: CPT | Performed by: FAMILY MEDICINE

## 2025-05-08 PROCEDURE — 99204 OFFICE O/P NEW MOD 45 MIN: CPT | Mod: 25 | Performed by: DERMATOLOGY

## 2025-05-08 RX ORDER — GLUCOSAMINE HCL/CHONDROITIN SU 500-400 MG
CAPSULE ORAL
Qty: 100 EACH | Refills: 11 | Status: SHIPPED | OUTPATIENT
Start: 2025-05-08

## 2025-05-08 RX ORDER — TIRZEPATIDE 5 MG/.5ML
5 INJECTION, SOLUTION SUBCUTANEOUS
Qty: 2 ML | Refills: 1 | Status: SHIPPED | OUTPATIENT
Start: 2025-05-26

## 2025-05-08 RX ORDER — AVOBENZONE, HOMOSALATE, OCTISALATE, OCTOCRYLENE 30; 40; 45; 26 MG/ML; MG/ML; MG/ML; MG/ML
CREAM TOPICAL
Qty: 100 EACH | Refills: 11 | Status: SHIPPED | OUTPATIENT
Start: 2025-05-08

## 2025-05-08 RX ORDER — BETAMETHASONE DIPROPIONATE 0.5 MG/G
CREAM TOPICAL
Qty: 45 G | Refills: 1 | Status: SHIPPED | OUTPATIENT
Start: 2025-05-08

## 2025-05-08 ASSESSMENT — FIBROSIS 4 INDEX: FIB4 SCORE: 2.75

## 2025-05-08 NOTE — PROGRESS NOTES
Verbal consent was acquired by the patient to use [employer] ambient listening note generation during this visit Yes    Subjective:   Andrei Galeas is a 79 y.o. male here today for   Chief Complaint   Patient presents with    Follow-Up     History of Present Illness  The patient is a 79-year-old male with a recent diagnosis of type 2 diabetes, presenting today for a follow-up visit. Two weeks ago, he was started on a medication regimen that includes metformin 500 mg twice daily and an initial dose of Mounjaro at 2.5 mg.    He has initiated his treatment with metformin, taking it twice daily, and reports experiencing a little bit of diarrhea. He has also begun Mounjaro therapy, with the second dose scheduled for tomorrow. He reports no issues with the injection. His exercise routine remains unchanged, but he has made significant dietary modifications, including a high-protein, low-carbohydrate diet and the elimination of fried foods and cookies. He has not yet consulted a nutritionist. Blood glucose levels have been monitored at home, with readings of 150 yesterday afternoon, 185 midmorning today, and 120 this morning. He uses the One Touch Ultra device for blood glucose monitoring.    He visited a dermatologist this morning and was diagnosed with granuloma annulare. The dermatologist suggested that his diabetes might be contributing to this condition. A steroid cream was prescribed for topical application.      Allergies   Allergen Reactions    Penicillins Unspecified     Childhood - unknown reaction    Seasonal          Current medicines (including changes today)  Current Outpatient Medications   Medication Sig Dispense Refill    betamethasone dipropionate 0.05 % Cream AAA arms/legs BID for rash/GA. May occlude under bandage for better absorption into affected sites. Do not use on face,axilla, groin 45 g 1    metFORMIN (GLUCOPHAGE) 500 MG Tab Take 1 Tablet by mouth every day for 14 days, THEN 1 Tablet 2  times a day with meals for 90 days. 194 Tablet 0    Tirzepatide (MOUNJARO) 2.5 MG/0.5ML Solution Auto-injector Inject 2.5 mg under the skin every 7 days. 2 mL 1    rosuvastatin (CRESTOR) 10 MG Tab Take 1 tablet by mouth once daily 100 Tablet 3    losartan-hydrochlorothiazide (HYZAAR) 100-25 MG per tablet Take 1 tablet by mouth once daily 100 Tablet 2    tamsulosin (FLOMAX) 0.4 MG capsule TAKE 2 CAPSULES BY MOUTH ONCE DAILY AS DIRECTED AFTER A MEAL IN THE EVENING 180 Capsule 3    finasteride (PROSCAR) 5 MG Tab Take 1 Tablet by mouth every day. 90 Tablet 3    diphenhydrAMINE-APAP, sleep, (TYLENOL PM EXTRA STRENGTH)  MG Tab Take 1 Tablet by mouth at bedtime as needed.      fexofenadine (ALLEGRA) 60 MG Tab Take 60 mg by mouth every day.      multivitamin (THERAGRAN) Tab Take 1 Tab by mouth every day.      fluticasone (FLONASE) 50 MCG/ACT nasal spray Administer 1 Spray into affected nostril(S) every day.       No current facility-administered medications for this visit.     He  has a past medical history of Arthritis, Back pain, Cataract, Depression, Diabetes ([facility]), Hypertension, Obstructive chronic bronchitis without exacerbation ([facility]) (8/5/2022), Shortness of breath, Sinusitis, Sleep apnea, and Wheezing.  He has no past medical history of Anginal syndrome ([facility]), Arrhythmia, Asthma, Blood clotting disorder ([facility]), CAD (coronary artery disease), Cancer ([facility]), Congestive heart failure ([facility]), Cough, Dialysis patient ([facility]), Dilated cardiomyopathy ([facility]), Disorder of thyroid, Fall, Glaucoma, Gynecological disorder, Heart murmur, Heart valve disease, Hemorrhagic disorder ([facility]), History of - myocardial infarction, Indigestion, Infectious disease, Jaundice, Myocardial infarct (HCC), Pacemaker, Painful breathing, Pneumonia, Renal disorder, Rheumatic fever, Seizure (HCC), Sputum production, Stroke ([facility]), or Urinary incontinence.    ROS   ROS  -See HPI     Objective:     Physical Exam:  /58 (BP Location: Left arm,  "Patient Position: Sitting, BP Cuff Size: Adult)   Pulse (!) 55   Temp 36.4 °C (97.5 °F) (Temporal)   Resp 16   Ht 1.803 m (5' 10.98\")   Wt 122 kg (268 lb 4.8 oz)   SpO2 95%  Body mass index is 37.44 kg/m².   Constitutional: Alert, no distress, well-groomed.  Skin: No rashes in visible areas.  Eye: Round. Conjunctiva clear, lids normal. No icterus.   ENMT: Lips pink without lesions, good dentition, moist mucous membranes. Phonation normal.  Neck: No masses, no thyromegaly. Moves freely without pain.  Respiratory: Unlabored respiratory effort, no cough or audible wheeze  Psych: Alert and oriented x3, normal affect and mood.       Results  - Laboratory Studies:    - Hemoglobin A1c: 15    - Fasting blood glucose: 120    Assessment and Plan:     Assessment & Plan  Type 2 Diabetes Mellitus.  He has been managing well on metformin 500 mg twice daily and Mounjaro 2.5 mg.  Diagnostic plan: His blood glucose levels have shown improvement, with recent readings around 120. He was advised to monitor his fasting blood glucose levels daily, aiming for a target close to 100. A prescription for a blood glucose monitor (One Touch Ultra) was provided.  Treatment plan: He was advised to take metformin with a high-protein meal. The dosage of Mounjaro will be increased to 5 mg after two more weeks at the current dose. He was encouraged to incorporate physical activity into his daily routine, such as walking or jogging for 30 minutes. A referral to a nutritionist was made to assist with dietary management.    Granuloma Annulare.  He was diagnosed with granuloma annulare by a dermatologist, who suggested it might be related to his diabetes.  Diagnostic plan: The dermatologist indicated that thyroid issues might contribute to the condition, but his thyroid function was checked and found to be normal.  Treatment plan: A steroid cream was prescribed for topical application. The patient will continue using the prescribed steroid cream " and monitor for any changes.    Follow-up: The patient will follow up in 1 to 2 months.    Orders:  1. Type 2 diabetes mellitus without complication, without long-term current use of insulin (HCC)  - Blood Glucose Meter Kit; Test blood sugar as recommended by provider. One Touch Ultra 2 blood glucose monitoring kit.  Dispense: 1 Kit; Refill: 0  - Blood Glucose Test Strips; Use one One Touch Ultra 2 strip to test blood sugar three times daily.  Dispense: 100 Strip; Refill: 11  - Lancets; Use one One Touch Ultra 2 lancet to test blood sugar three times daily.  Dispense: 100 Each; Refill: 11  - Alcohol Swabs; Wipe site with prep pad prior to injection.  Dispense: 100 Each; Refill: 11  - Tirzepatide (MOUNJARO) 5 MG/0.5ML Solution Auto-injector; Inject 5 mg under the skin every 7 days.  Dispense: 2 mL; Refill: 1        Followup: No follow-ups on file.         PLEASE NOTE: This dictation was created using voice recognition and [employer] ambient listening software. I have made every reasonable attempt to correct obvious errors, but I expect that there are errors of grammar and possibly content that I did not discover before finalizing the note.

## 2025-05-08 NOTE — PROGRESS NOTES
CC: Establish Care and Skin Lesion     Subjective: New patient here for skin lesion     HPI/location:spots all over body   Time present: 3 years   Painful lesion: No  Itching lesion: No  Enlarging lesion: Yes spreading   Anything make it better or worse?   Was told it was GA after path bx at [facility]. Tried IL tac without notable sustained effects    History of skin cancer: Yes, Details: BCC on neck and scalp 12-15 years   History of precancers/actinic keratoses: Yes, Details: all over body   History of biopsies:No  History of blistering/severe sunburns:Yes, Details: teenager   Family history of skin cancer:No  Family history of atypical moles:No    ROS: no fevers/chills. No itch.  No cough  Relevant PMH:NC  Social:FS    PE: Gen:WDWN male in NAD. Skin: focal exam: face/arms/legs/back examined  -thin hK papules on scalp X 2  -many benign appearing waxy, stuck on papules on torso and lower legs. Mild xerosis of lower legs  -brawny hyperpigmentation lower legs  -diffuse annular plaques, dermal border elevated on arms/legs and torso    Labs:   April 2025  HgbA1C: 15,   TSH - WNL  TG - 486, LDL-31    A/P: GA:   -reviewed [facility] [place] information  -trial topical betamethasone cream BID-->PRN, se reviewed. Pick on site and observe for effects over 6-8 weeks  -f/u 6-8 weeks    AKs: scalp  -counseled on diagnosis and treatment, including risks/benefits/alternatives of cyrotherapy  -LN2 25 sec X 2 cycles X 2lesions  -f/u if persists at 1 month    SK:  -b/r    Venous stasis:   -moisturizer use for dryness    HTG/HLD/DM:   -PCP management may improve GA related to these conditions    I have reviewed medications relevant to my specialty.

## 2025-05-09 DIAGNOSIS — E11.9 TYPE 2 DIABETES MELLITUS WITHOUT COMPLICATION, WITHOUT LONG-TERM CURRENT USE OF INSULIN (HCC): ICD-10-CM

## 2025-05-09 NOTE — TELEPHONE ENCOUNTER
Received request via: Pharmacy    Was the patient seen in the last year in this department? Yes    Does the patient have an active prescription (recently filled or refills available) for medication(s) requested? No    Pharmacy Name: [facility]    Pharmacy requested Sig correction. Please advise resigning pended script

## 2025-05-12 ENCOUNTER — TELEPHONE (OUTPATIENT)
Dept: MEDICAL GROUP | Facility: LAB | Age: 80
End: 2025-05-12
Payer: MEDICARE

## 2025-05-12 NOTE — TELEPHONE ENCOUNTER
\MEDICATION PRIOR AUTHORIZATION NEEDED:    1. Name of Medication: One touch ultra 2 device kit     2. Requested By (Name of Pharmacy): [facility]     3. Is insurance on file current? Yes     4. What is the name & phone number of the 3rd party payor? [employer] Galeas (Key: [id number])  PA Case ID #: [id number]  Rx #: [id number]  Need Help? Call us at [phone]  Outcome  Approved on May 10 by [employer] 2017 NCP[employer]  Request Reference Number: [id number]. ONETOUCH KIT ULTRA 2 is approved through 05/08/2028. Your patient may now fill this prescription and it will be covered.  Effective Date: 5/8/2025  Authorization Expiration Date: 5/8/2028

## 2025-06-09 ENCOUNTER — HOSPITAL ENCOUNTER (OUTPATIENT)
Facility: MEDICAL CENTER | Age: 80
End: 2025-06-09
Attending: FAMILY MEDICINE
Payer: MEDICARE

## 2025-06-09 ENCOUNTER — OFFICE VISIT (OUTPATIENT)
Dept: MEDICAL GROUP | Facility: LAB | Age: 80
End: 2025-06-09
Payer: MEDICARE

## 2025-06-09 VITALS
HEIGHT: 71 IN | HEART RATE: 60 BPM | SYSTOLIC BLOOD PRESSURE: 120 MMHG | DIASTOLIC BLOOD PRESSURE: 54 MMHG | TEMPERATURE: 97.2 F | WEIGHT: 258 LBS | BODY MASS INDEX: 36.12 KG/M2 | RESPIRATION RATE: 16 BRPM | OXYGEN SATURATION: 95 %

## 2025-06-09 DIAGNOSIS — E66.01 SEVERE OBESITY (HCC): ICD-10-CM

## 2025-06-09 DIAGNOSIS — R19.7 DIARRHEA, UNSPECIFIED TYPE: ICD-10-CM

## 2025-06-09 DIAGNOSIS — E11.9 TYPE 2 DIABETES MELLITUS WITHOUT COMPLICATION, WITHOUT LONG-TERM CURRENT USE OF INSULIN (HCC): Primary | ICD-10-CM

## 2025-06-09 DIAGNOSIS — E11.9 TYPE 2 DIABETES MELLITUS WITHOUT COMPLICATION, WITHOUT LONG-TERM CURRENT USE OF INSULIN (HCC): ICD-10-CM

## 2025-06-09 LAB
AMBIGUOUS DTTM AMBI4: NORMAL
CREAT UR-MCNC: 80 MG/DL
MICROALBUMIN UR-MCNC: 4.8 MG/DL
MICROALBUMIN/CREAT UR: 60 MG/G (ref 0–30)

## 2025-06-09 PROCEDURE — 3078F DIAST BP <80 MM HG: CPT | Performed by: FAMILY MEDICINE

## 2025-06-09 PROCEDURE — 82570 ASSAY OF URINE CREATININE: CPT

## 2025-06-09 PROCEDURE — 82043 UR ALBUMIN QUANTITATIVE: CPT

## 2025-06-09 PROCEDURE — 3074F SYST BP LT 130 MM HG: CPT | Performed by: FAMILY MEDICINE

## 2025-06-09 PROCEDURE — 99214 OFFICE O/P EST MOD 30 MIN: CPT | Performed by: FAMILY MEDICINE

## 2025-06-09 ASSESSMENT — FIBROSIS 4 INDEX: FIB4 SCORE: 2.75

## 2025-06-09 NOTE — PROGRESS NOTES
Verbal consent was acquired by the patient to use [employer] ambient listening note generation during this visit Yes    Subjective:   Andrei Galeas is a 79 y.o. male here today for   No chief complaint on file.    History of Present Illness  The patient is a 79-year-old male who presents today for a follow-up on type 2 diabetes. Recently, he had a hemoglobin A1c of 15 completed on 04/19/2025. At that point, he started a new medication regimen, including Mounjaro, which has been increased to 5 mg. He is here today for a medication check.    He expresses dissatisfaction with his current medication regimen, stating that he felt better prior to its initiation. Despite this, he reports satisfactory blood glucose levels, with readings as low as 100 on several occasions. His dietary habits have improved, with a reduction in egg and carrot consumption and an increase in vegetable intake. He has also made significant reductions in his carbohydrate intake. He has lost weight, as evidenced by his loose-fitting clothing, but does not monitor his weight at home due to an unreliable scale.    He experiences frequent diarrhea and requests a colonoscopy appointment. His last colonoscopy was performed approximately 4 years ago.    His physical activity is limited due to back and knee pain, although he does engage in walking exercises using a grocery cart for support. He expresses a desire to lose an additional 25 to 50 pounds to alleviate his back pain.    He reports no numbness, tingling, or pain in his feet. He has an upcoming ophthalmology appointment scheduled for 07/2025.      Allergies[1]      Current medicines (including changes today)  Current Medications[2]  He  has a past medical history of Arthritis, Back pain, Cataract, Depression, Diabetes (HCC), Hypertension, Obstructive chronic bronchitis without exacerbation (HCC) (8/5/2022), Shortness of breath, Sinusitis, Sleep apnea, and Wheezing.    He has no past  "medical history of Anginal syndrome (HCC), Arrhythmia, Asthma, Blood clotting disorder (HCC), CAD (coronary artery disease), Cancer (HCC), Congestive heart failure (HCC), Cough, Dialysis patient (HCC), Dilated cardiomyopathy (HCC), Disorder of thyroid, Fall, Glaucoma, Gynecological disorder, Heart murmur, Heart valve disease, Hemorrhagic disorder (HCC), History of - myocardial infarction, Indigestion, Infectious disease, Jaundice, Myocardial infarct (HCC), Pacemaker, Painful breathing, Pneumonia, Renal disorder, Rheumatic fever, Seizure (HCC), Sputum production, Stroke (HCC), or Urinary incontinence.    ROS   ROS  -See HPI     Objective:     Physical Exam:  /54 (BP Location: Left arm, Patient Position: Sitting, BP Cuff Size: Large adult)   Pulse 60   Temp 36.2 °C (97.2 °F) (Temporal)   Resp 16   Ht 1.803 m (5' 10.98\")   Wt 117 kg (258 lb)   SpO2 95%  Body mass index is 36 kg/m².   Constitutional: Alert, no distress, well-groomed.  Skin: No rashes in visible areas.  Eye: Round. Conjunctiva clear, lids normal. No icterus.   ENMT: Lips pink without lesions, good dentition, moist mucous membranes. Phonation normal.  Neck: No masses, no thyromegaly. Moves freely without pain.  Respiratory: Unlabored respiratory effort, no cough or audible wheeze  Psych: Alert and oriented x3, normal affect and mood.   Monofilament testing with a 10 gram force: sensation intact: intact bilaterally  Visual Inspection: Feet without maceration, ulcers, fissures.  Pedal pulses: intact bilaterally    Results  - Laboratory Studies:    - Hemoglobin A1c: 15 (04/19/2025)    Assessment and Plan:     Assessment & Plan  Type 2 diabetes mellitus  Blood glucose levels have shown significant improvement, with today's reading at 110. Weight loss of approximately 50 pounds since the initiation of Mounjaro therapy two months ago. Advised to incorporate some carbohydrates into the diet to prevent fatigue and maintain a daily protein intake of at " least 90 grams. Encouraged to engage in physical activities such as walking or swimming.  Diagnostic plan: A urine sample will be collected today for further analysis. An A1c test will be conducted in one month to assess the need for any adjustments in medication regimen.  Treatment plan: Plan to discontinue metformin and maintain the Mounjaro dosage at 5 mg. Continue monitoring blood glucose levels.    Diarrhea  Reported experiencing diarrhea, which may be a side effect of current medications. Discontinuation of metformin is expected to alleviate this symptom.  Clinical decision making: If symptoms persist, further evaluation will be considered.    HCC Gap Form    Diagnosis: E66.01 - Severe obesity (HCC)  Z68.36 - Body mass index (BMI) of 36.0-36.9 in adult  Comorbidity Diagnosis: Type 2 diabetes mellitus without complication, without long-term current use of insulin (HCC)  The current BMI is 36.00 kg/m² as of 06/09/25.    Past BMI Values:  06/09/25 : 36.00 kg/m². 05/08/25 : 37.44 kg/m². 04/24/25 : 36.70 kg/m².   Assessment and plan: Chronic, improving. Encouraged healthy diet and physical activity changes with a goal of weight loss. Follow up at least annually. The comorbid condition is improving based on today's assessment. Continue current treatment plan. Follow up in 1 month.    Last edited 06/09/25 10:42 PDT by Leo Villegas M.D.           Follow-up: Follow up in one month.    Orders:  1. Type 2 diabetes mellitus without complication, without long-term current use of insulin (HCC)  - MICROALBUMIN CREAT RATIO URINE; Future  - Diabetic Monofilament LE Exam    2. Diarrhea, unspecified type    3. Severe obesity (HCC)        Followup: No follow-ups on file.         PLEASE NOTE: This dictation was created using voice recognition and [employer] ambient listening software. I have made every reasonable attempt to correct obvious errors, but I expect that there are errors of grammar and possibly content that  I did not discover before finalizing the note.         [1]   Allergies  Allergen Reactions    Penicillins Unspecified     Childhood - unknown reaction    Seasonal    [2]   Current Outpatient Medications   Medication Sig Dispense Refill    Blood Glucose Meter Kit Use one One Touch Ultra 2 strip with One Touch Ultra 2 blood glucose monitoring kit to test blood sugar three times daily. 1 Kit 0    betamethasone dipropionate 0.05 % Cream AAA arms/legs BID for rash/GA. May occlude under bandage for better absorption into affected sites. Do not use on face,axilla, groin 45 g 1    Blood Glucose Test Strips Use one One Touch Ultra 2 strip to test blood sugar three times daily. 100 Strip 11    Lancets Use one One Touch Ultra 2 lancet to test blood sugar three times daily. 100 Each 11    Alcohol Swabs Wipe site with prep pad prior to injection. 100 Each 11    Tirzepatide (MOUNJARO) 5 MG/0.5ML Solution Auto-injector Inject 5 mg under the skin every 7 days. 2 mL 1    metFORMIN (GLUCOPHAGE) 500 MG Tab Take 1 Tablet by mouth every day for 14 days, THEN 1 Tablet 2 times a day with meals for 90 days. 194 Tablet 0    rosuvastatin (CRESTOR) 10 MG Tab Take 1 tablet by mouth once daily 100 Tablet 3    losartan-hydrochlorothiazide (HYZAAR) 100-25 MG per tablet Take 1 tablet by mouth once daily 100 Tablet 2    tamsulosin (FLOMAX) 0.4 MG capsule TAKE 2 CAPSULES BY MOUTH ONCE DAILY AS DIRECTED AFTER A MEAL IN THE EVENING 180 Capsule 3    finasteride (PROSCAR) 5 MG Tab Take 1 Tablet by mouth every day. 90 Tablet 3    diphenhydrAMINE-APAP, sleep, (TYLENOL PM EXTRA STRENGTH)  MG Tab Take 1 Tablet by mouth at bedtime as needed.      fexofenadine (ALLEGRA) 60 MG Tab Take 60 mg by mouth every day.      multivitamin (THERAGRAN) Tab Take 1 Tab by mouth every day.      fluticasone (FLONASE) 50 MCG/ACT nasal spray Administer 1 Spray into affected nostril(S) every day.       No current facility-administered medications for this visit.

## 2025-06-10 ENCOUNTER — RESULTS FOLLOW-UP (OUTPATIENT)
Dept: MEDICAL GROUP | Facility: LAB | Age: 80
End: 2025-06-10

## 2025-06-19 ENCOUNTER — OFFICE VISIT (OUTPATIENT)
Dept: DERMATOLOGY | Facility: IMAGING CENTER | Age: 80
End: 2025-06-19
Payer: MEDICARE

## 2025-06-19 DIAGNOSIS — L92.0 GRANULOMA ANNULARE: Primary | ICD-10-CM

## 2025-06-19 DIAGNOSIS — D49.2 NEOPLASM OF SKIN: ICD-10-CM

## 2025-06-19 PROCEDURE — 99213 OFFICE O/P EST LOW 20 MIN: CPT | Mod: 25 | Performed by: DERMATOLOGY

## 2025-06-19 PROCEDURE — 11102 TANGNTL BX SKIN SINGLE LES: CPT | Performed by: DERMATOLOGY

## 2025-06-19 RX ORDER — BETAMETHASONE DIPROPIONATE 0.5 MG/G
CREAM TOPICAL
Qty: 45 G | Refills: 1 | Status: SHIPPED | OUTPATIENT
Start: 2025-06-19

## 2025-06-19 NOTE — PROGRESS NOTES
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   Was told it was GA after path bx at [facility]. Tried IL tac without notable sustained effects  Has been treated with topical betamethasone and reports some improvement.     Other sites on arms, new concern. Erupted and red. Denies exposures to chemo cream/retinol/al, SA or other peeling agent. Not bothersome to patient. Using cerave only.     History of skin cancer: Yes, Details: BCC on neck and scalp 12-15 years   History of precancers/actinic keratoses: Yes, Details: all over body   History of biopsies:No  History of blistering/severe sunburns:Yes, Details: teenager   Family history of skin cancer:No  Family history of atypical moles:No    ROS: no fevers/chills. No itch.  No cough  Relevant PMH:NC  Social:FS    PE: Gen:WDWN male in NAD. Skin: focal exam: arms examined  -diffuse annular plaques, patches of erythema/hyperpigmentation on arms/elbows  -waxy, irritated papules diffuse on arms.    Labs:   April 2025  HgbA1C: 15,   TSH - WNL  TG - 486, LDL-31    A/P: GA:   -prev reviewed [facility] [employer] [place] information  -can cont topical betamethasone cream BID-->PRN, se reviewed.   -f/u PRN    Suspect irritated Sks, widespread, arms: possibly related to pumice stone aggravation reported by patient \"once, lightly, one week ago\"  Neoplasm NOS: left arm  -consent for bx, including R/B/A. Cleaned with EtOH, anesthesia with lidocaine 1% + epinephrine, shave bx, AlCl3 for hemostasis  -vaseline/bandage and wound care reviewed      HTG/HLD/DM:   -PCP management may improve GA related to these conditions    I have reviewed medications relevant to my specialty.          "

## 2025-06-26 RX ORDER — FLUOROURACIL 50 MG/G
CREAM TOPICAL
Qty: 40 G | Refills: 0 | Status: SHIPPED | OUTPATIENT
Start: 2025-06-26

## 2025-07-11 ENCOUNTER — OFFICE VISIT (OUTPATIENT)
Dept: MEDICAL GROUP | Facility: LAB | Age: 80
End: 2025-07-11
Payer: MEDICARE

## 2025-07-11 VITALS
WEIGHT: 252 LBS | TEMPERATURE: 97.2 F | RESPIRATION RATE: 16 BRPM | HEART RATE: 56 BPM | DIASTOLIC BLOOD PRESSURE: 58 MMHG | BODY MASS INDEX: 35.28 KG/M2 | OXYGEN SATURATION: 94 % | SYSTOLIC BLOOD PRESSURE: 124 MMHG | HEIGHT: 71 IN

## 2025-07-11 DIAGNOSIS — G89.29 CHRONIC RIGHT SHOULDER PAIN: ICD-10-CM

## 2025-07-11 DIAGNOSIS — M25.511 CHRONIC RIGHT SHOULDER PAIN: ICD-10-CM

## 2025-07-11 DIAGNOSIS — E11.9 TYPE 2 DIABETES MELLITUS WITHOUT COMPLICATION, WITHOUT LONG-TERM CURRENT USE OF INSULIN (HCC): Primary | ICD-10-CM

## 2025-07-11 LAB
HBA1C MFR BLD: 5.9 % (ref ?–5.8)
POCT INT CON NEG: NEGATIVE
POCT INT CON POS: POSITIVE

## 2025-07-11 PROCEDURE — 99214 OFFICE O/P EST MOD 30 MIN: CPT | Performed by: FAMILY MEDICINE

## 2025-07-11 PROCEDURE — 83036 HEMOGLOBIN GLYCOSYLATED A1C: CPT | Performed by: FAMILY MEDICINE

## 2025-07-11 PROCEDURE — 3074F SYST BP LT 130 MM HG: CPT | Performed by: FAMILY MEDICINE

## 2025-07-11 PROCEDURE — 3078F DIAST BP <80 MM HG: CPT | Performed by: FAMILY MEDICINE

## 2025-07-11 RX ORDER — TIRZEPATIDE 5 MG/.5ML
5 INJECTION, SOLUTION SUBCUTANEOUS
Qty: 2 ML | Refills: 3 | Status: SHIPPED | OUTPATIENT
Start: 2025-07-11

## 2025-07-11 ASSESSMENT — FIBROSIS 4 INDEX: FIB4 SCORE: 2.75

## 2025-07-11 NOTE — PROGRESS NOTES
Verbal consent was acquired by the patient to use [employer] ambient listening note generation during this visit Yes    Subjective:   Andrei Galeas is a 79 y.o. male here today for   Chief Complaint   Patient presents with    Diabetes Mellitus     History of Present Illness  The patient is a 79-year-old male with uncontrolled type 2 diabetes. He started treatment with Mounjaro, currently at 5 mg, and has been working on an appropriate diet and exercise regimen. He is here today for a follow-up A1c, which is 5.9%.    He reports adherence to his diet and regular monitoring of his blood sugar levels at home, which have been around 100, with occasional readings as low as 83, 97, and 99. However, he noticed a sudden increase in his blood sugar levels last week. He engages in regular walking exercises and is considering increasing the dosage of his medication.    He mentions that his skin condition is improving with the application of a cream, which has not caused any redness or irritation. He inquires about any dietary changes that could further improve his skin condition.    Additionally, he reports worsening arthritis pain since starting the medication, experiencing cramping in his left shoulder and stiffness in his arms and legs. He takes Tylenol before bed to manage the pain.      Allergies[1]      Current medicines (including changes today)  Current Medications[2]  He  has a past medical history of Arthritis, Back pain, Cataract, Depression, Diabetes ([facility]), Hypertension, Obstructive chronic bronchitis without exacerbation (HCC) (8/5/2022), Shortness of breath, Sinusitis, Sleep apnea, and Wheezing.    He has no past medical history of Anginal syndrome ([facility]), Arrhythmia, Asthma, Blood clotting disorder ([facility]), CAD (coronary artery disease), Cancer ([facility]), Congestive heart failure (HCC), Cough, Dialysis patient ([facility]), Dilated cardiomyopathy ([facility]), Disorder of thyroid, Fall, Glaucoma, Gynecological disorder,  "Heart murmur, Heart valve disease, Hemorrhagic disorder (HCC), History of - myocardial infarction, Indigestion, Infectious disease, Jaundice, Myocardial infarct (HCC), Pacemaker, Painful breathing, Pneumonia, Renal disorder, Rheumatic fever, Seizure (HCC), Sputum production, Stroke (HCC), or Urinary incontinence.    ROS   ROS  -See HPI     Objective:     Physical Exam:  /58 (BP Location: Left arm, Patient Position: Sitting, BP Cuff Size: Large adult)   Pulse (!) 56   Temp 36.2 °C (97.2 °F) (Temporal)   Resp 16   Ht 1.803 m (5' 10.98\")   Wt 114 kg (252 lb)   SpO2 94%  Body mass index is 35.16 kg/m².   Constitutional: Alert, no distress, well-groomed.  Skin: No rashes in visible areas.  Eye: Round. Conjunctiva clear, lids normal. No icterus.   ENMT: Lips pink without lesions, good dentition, moist mucous membranes. Phonation normal.  Neck: No masses, no thyromegaly. Moves freely without pain.  Respiratory: Unlabored respiratory effort, no cough or audible wheeze  Psych: Alert and oriented x3, normal affect and mood.     Results  - Laboratory Studies:    - Hemoglobin A1c: 5.9% (07/11/2025)    Assessment and Plan:     Assessment & Plan  Type 2 diabetes mellitus  A1c level has significantly improved from 15% to 5.9%, indicating effective management. Weight has decreased from 300 to 252 pounds, with a loss of 4 pounds in the past month. Discussed risks associated with elevated blood sugar levels, including nerve and blood vessel deterioration, increased risk of heart attack, and potential vision changes. Also discussed the possibility of developing thyroid cancer or other cancers given his family history.  Treatment plan: Mounjaro dosage will be increased to 7.5 mg. A prescription refill for Mounjaro 5 mg was provided. Advised to continue taking simvastatin.    Arthritis/bilateral shoulder pain   Chronic condition, unstable.  Symptoms worsening.  Reports worsening arthritis pain since starting Mounjaro. " Physical exam findings suggest possible arthritis in the shoulders.  Diagnostic plan: Ordered an x-ray of the shoulders to assess the cause of pain.  Clinical decision making: If x-ray results are normal, physical therapy may be considered.    Follow-up: A follow-up appointment is scheduled for 3 months from now.    Orders:  1. Type 2 diabetes mellitus without complication, without long-term current use of insulin (HCC)  - POCT Hemoglobin A1C  - Tirzepatide (MOUNJARO) 5 MG/0.5ML Solution Auto-injector; Inject 5 mg under the skin every 7 days.  Dispense: 2 mL; Refill: 3    2. Chronic right shoulder pain  - DX-SHOULDER 2+ LEFT; Future  - DX-SHOULDER 2+ RIGHT; Future        Followup: No follow-ups on file.         PLEASE NOTE: This dictation was created using voice recognition and [employer] ambient [employer] software. I have made every reasonable attempt to correct obvious errors, but I expect that there are errors of grammar and possibly content that I did not discover before finalizing the note.         [1]   Allergies  Allergen Reactions    Penicillins Unspecified     Childhood - unknown reaction    Seasonal    [2]   Current Outpatient Medications   Medication Sig Dispense Refill    fluorouracil (EFUDEX) 5 % cream AAA backs of arms BID X 2-4 weeks. Stop when skin is red, crusted and a little sore X 5- 7days 40 g 0    betamethasone dipropionate 0.05 % Cream AAA arms/legs BID for rash/GA. May occlude under bandage for better absorption into affected sites. Do not use on face,axilla, groin 45 g 1    Blood Glucose Meter Kit Use one One Touch Ultra 2 strip with One Touch Ultra 2 blood glucose monitoring kit to test blood sugar three times daily. 1 Kit 0    Blood Glucose Test Strips Use one One Touch Ultra 2 strip to test blood sugar three times daily. 100 Strip 11    Lancets Use one One Touch Ultra 2 lancet to test blood sugar three times daily. 100 Each 11    Alcohol Swabs Wipe site with prep pad prior to injection.  100 Each 11    Tirzepatide (MOUNJARO) 5 MG/0.5ML Solution Auto-injector Inject 5 mg under the skin every 7 days. 2 mL 1    rosuvastatin (CRESTOR) 10 MG Tab Take 1 tablet by mouth once daily 100 Tablet 3    losartan-hydrochlorothiazide (HYZAAR) 100-25 MG per tablet Take 1 tablet by mouth once daily 100 Tablet 2    tamsulosin (FLOMAX) 0.4 MG capsule TAKE 2 CAPSULES BY MOUTH ONCE DAILY AS DIRECTED AFTER A MEAL IN THE EVENING 180 Capsule 3    finasteride (PROSCAR) 5 MG Tab Take 1 Tablet by mouth every day. 90 Tablet 3    diphenhydrAMINE-APAP, sleep, (TYLENOL PM EXTRA STRENGTH)  MG Tab Take 1 Tablet by mouth at bedtime as needed.      fexofenadine (ALLEGRA) 60 MG Tab Take 60 mg by mouth every day.      multivitamin (THERAGRAN) Tab Take 1 Tab by mouth every day.      fluticasone (FLONASE) 50 MCG/ACT nasal spray Administer 1 Spray into affected nostril(S) every day.       No current facility-administered medications for this visit.

## 2025-07-15 ENCOUNTER — APPOINTMENT (OUTPATIENT)
Dept: AUDIOLOGY | Facility: OTHER | Age: 80
End: 2025-07-15
Payer: MEDICARE

## 2025-07-18 ENCOUNTER — OFFICE VISIT (OUTPATIENT)
Dept: DERMATOLOGY | Facility: IMAGING CENTER | Age: 80
End: 2025-07-18
Payer: MEDICARE

## 2025-07-18 DIAGNOSIS — L92.0 GRANULOMA ANNULARE: ICD-10-CM

## 2025-07-18 DIAGNOSIS — L81.4 LENTIGO: ICD-10-CM

## 2025-07-18 DIAGNOSIS — L82.1 SEBORRHEIC KERATOSIS: ICD-10-CM

## 2025-07-18 DIAGNOSIS — L57.0 ACTINIC KERATOSIS: Primary | ICD-10-CM

## 2025-07-18 PROCEDURE — 99213 OFFICE O/P EST LOW 20 MIN: CPT | Performed by: DERMATOLOGY

## 2025-07-18 RX ORDER — FLUOROURACIL 50 MG/G
CREAM TOPICAL
Qty: 40 G | Refills: 2 | Status: SHIPPED | OUTPATIENT
Start: 2025-07-18

## 2025-07-18 NOTE — PROGRESS NOTES
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   Was told it was GA after path bx at [facility]. Tried IL tac without notable sustained effects  Has been treated with topical betamethasone and reports some improvement.     Other sites on arms, new concern. Erupted and red. Denies exposures to chemo cream/retinol/al, SA or other peeling agent. Not bothersome to patient. Using cerave only. \"    History of skin cancer: Yes, Details: BCC on neck and scalp 12-15 years , AK/SK - left arm  History of precancers/actinic keratoses: Yes, Details: all over body   History of biopsies:No  History of blistering/severe sunburns:Yes, Details: teenager   Family history of skin cancer:No  Family history of atypical moles:No    ROS: no fevers/chills. No itch.  No cough  Relevant PMH:NC  Social:FS    PE: Gen:WDWN male in NAD. Skin: focal exam: arms examined  -diffuse annular plaques, patches of erythema/hyperpigmentation on arms/elbows  -waxy, irritated papules diffuse on arms.  -variably sized crusted/HK papules appearing irritated    Labs:   April 2025  HgbA1C: 15,   TSH - WNL  TG - 486, LDL-31    A/P: GA:   -prev reviewed [employer] [place] information  -can cont topical betamethasone cream BID-->PRN, se reviewed.   -f/u PRN    Aks, arms:  -efudex cream BID X 2-4 weeks, reviewed course    SK/lentigos, arms:  -b/r    F/u 2-3 months  I have reviewed medications relevant to my specialty.          "

## 2025-08-01 ENCOUNTER — APPOINTMENT (OUTPATIENT)
Dept: RADIOLOGY | Facility: MEDICAL CENTER | Age: 80
End: 2025-08-01
Attending: FAMILY MEDICINE
Payer: MEDICARE

## 2025-08-01 DIAGNOSIS — M25.511 CHRONIC RIGHT SHOULDER PAIN: ICD-10-CM

## 2025-08-01 DIAGNOSIS — G89.29 CHRONIC RIGHT SHOULDER PAIN: ICD-10-CM

## 2025-08-01 PROCEDURE — 73030 X-RAY EXAM OF SHOULDER: CPT | Mod: LT

## 2025-08-01 PROCEDURE — 73030 X-RAY EXAM OF SHOULDER: CPT | Mod: RT

## 2025-08-04 DIAGNOSIS — N40.1 LOWER URINARY TRACT SYMPTOMS DUE TO BENIGN PROSTATIC HYPERPLASIA: ICD-10-CM

## 2025-08-05 RX ORDER — TAMSULOSIN HYDROCHLORIDE 0.4 MG/1
CAPSULE ORAL
Qty: 180 CAPSULE | Refills: 3 | Status: SHIPPED | OUTPATIENT
Start: 2025-08-05

## 2025-08-06 DIAGNOSIS — E11.9 TYPE 2 DIABETES MELLITUS WITHOUT COMPLICATION, WITHOUT LONG-TERM CURRENT USE OF INSULIN (HCC): ICD-10-CM

## 2025-08-07 RX ORDER — BETAMETHASONE DIPROPIONATE 0.5 MG/G
CREAM TOPICAL
Qty: 45 G | Refills: 1 | Status: SHIPPED | OUTPATIENT
Start: 2025-08-07